# Patient Record
Sex: MALE | Race: WHITE | NOT HISPANIC OR LATINO
[De-identification: names, ages, dates, MRNs, and addresses within clinical notes are randomized per-mention and may not be internally consistent; named-entity substitution may affect disease eponyms.]

---

## 2022-09-21 ENCOUNTER — APPOINTMENT (OUTPATIENT)
Dept: NEUROLOGY | Facility: CLINIC | Age: 52
End: 2022-09-21

## 2022-09-28 ENCOUNTER — FORM ENCOUNTER (OUTPATIENT)
Age: 52
End: 2022-09-28

## 2022-09-30 ENCOUNTER — NON-APPOINTMENT (OUTPATIENT)
Age: 52
End: 2022-09-30

## 2022-09-30 ENCOUNTER — APPOINTMENT (OUTPATIENT)
Dept: NEUROLOGY | Facility: CLINIC | Age: 52
End: 2022-09-30

## 2022-09-30 VITALS — SYSTOLIC BLOOD PRESSURE: 157 MMHG | DIASTOLIC BLOOD PRESSURE: 103 MMHG

## 2022-09-30 VITALS
HEART RATE: 84 BPM | TEMPERATURE: 97.3 F | BODY MASS INDEX: 34.23 KG/M2 | OXYGEN SATURATION: 98 % | WEIGHT: 213 LBS | HEIGHT: 66 IN

## 2022-09-30 DIAGNOSIS — Z86.39 PERSONAL HISTORY OF OTHER ENDOCRINE, NUTRITIONAL AND METABOLIC DISEASE: ICD-10-CM

## 2022-09-30 DIAGNOSIS — I63.9 CEREBRAL INFARCTION, UNSPECIFIED: ICD-10-CM

## 2022-09-30 DIAGNOSIS — R20.2 ANESTHESIA OF SKIN: ICD-10-CM

## 2022-09-30 DIAGNOSIS — Z86.79 PERSONAL HISTORY OF OTHER DISEASES OF THE CIRCULATORY SYSTEM: ICD-10-CM

## 2022-09-30 DIAGNOSIS — R20.0 ANESTHESIA OF SKIN: ICD-10-CM

## 2022-09-30 DIAGNOSIS — E11.8 TYPE 2 DIABETES MELLITUS WITH UNSPECIFIED COMPLICATIONS: ICD-10-CM

## 2022-09-30 PROCEDURE — 99205 OFFICE O/P NEW HI 60 MIN: CPT

## 2022-09-30 RX ORDER — AMLODIPINE BESYLATE 5 MG/1
5 TABLET ORAL DAILY
Refills: 0 | Status: ACTIVE | COMMUNITY

## 2022-09-30 RX ORDER — RIVAROXABAN 20 MG/1
20 TABLET, FILM COATED ORAL
Refills: 0 | Status: ACTIVE | COMMUNITY

## 2022-09-30 RX ORDER — LISINOPRIL AND HYDROCHLOROTHIAZIDE TABLETS 20; 12.5 MG/1; MG/1
20-12.5 TABLET ORAL DAILY
Refills: 0 | Status: ACTIVE | COMMUNITY

## 2022-09-30 RX ORDER — GABAPENTIN 100 MG/1
100 CAPSULE ORAL
Qty: 120 | Refills: 3 | Status: ACTIVE | COMMUNITY

## 2022-09-30 RX ORDER — EZETIMIBE 10 MG/1
10 TABLET ORAL DAILY
Refills: 0 | Status: ACTIVE | COMMUNITY

## 2022-09-30 RX ORDER — ASPIRIN 81 MG
81 TABLET, DELAYED RELEASE (ENTERIC COATED) ORAL DAILY
Refills: 0 | Status: ACTIVE | COMMUNITY

## 2022-09-30 RX ORDER — ROSUVASTATIN CALCIUM 40 MG/1
40 TABLET, FILM COATED ORAL DAILY
Refills: 0 | Status: ACTIVE | COMMUNITY

## 2022-09-30 RX ORDER — LEVOTHYROXINE SODIUM 0.03 MG/1
25 TABLET ORAL DAILY
Refills: 0 | Status: ACTIVE | COMMUNITY

## 2022-09-30 NOTE — ASSESSMENT
[FreeTextEntry1] : The patient is a 52-year-old gentleman with a history of hypertension, hyperlipidemia, type 2 diabetes, hypothyroidism, and stroke in 2018/2019 as well as recurrent strokes over the last 2 months of unclear etiology.  Certainly, the patient has multiple risk factors for stroke but is also very young.  Unfortunately, I am unable to determine the most likely cause of the patient's recurrent strokes in the absence of complete records and review of imaging.  The patient and his wife will obtain this for me for my review.  I have requested repeat MRI with and without contrast of the brain, TTE with bubble to start.  We will consider additional labs (hypercoagulable testing, lipoprotein A), and even PET scan if necessary.  I have encouraged him to pursue the paratracheal biopsy as if it is proven to be cancer, this could be related to his recurrent strokes.  For now, he will continue Xarelto, aspirin 81 mg, and Crestor.  I encouraged him to work closely with his primary care provider for better control of his blood pressure and other risk factors.  We will consider HOLLAND screening at the next visit.

## 2022-09-30 NOTE — PHYSICAL EXAM
[FreeTextEntry1] : Alert.  Fully oriented.  Speech is mildly dysarthric.  Language is intact.  He has slight right nasolabial flattening.  Otherwise, cranial nerves are intact.  Individual muscle testing is full strength.  He does have a very subtle pronator drift on the right.  He is diffusely hyperreflexic with bilateral nonsustained clonus, bilateral positive Wendy's, and bilateral crossed adduction.  Toes are upgoing bilaterally.  Sensation is intact to light touch throughout but he reports it is decreased to temperature on the right compared to the left.  He has very slight difficulty with finger-to-nose and heel-to-shin on the right compared to the left.  AMRs are reduced on the right.  When attempting to stand, the patient has significant difficulty getting up.  He is a very unsteady, slightly wide-based gait.

## 2022-09-30 NOTE — HISTORY OF PRESENT ILLNESS
[FreeTextEntry1] : The patient is a 52-year-old gentleman with a history of hypertension, hyperlipidemia, type 2 diabetes, hypothyroidism, and stroke in 2018/2019 as well as recurrent strokes over the last 2 months.  He is here for stroke evaluation.\par \par I reviewed approximately 80/164 pages of records.  However, the remainder of the records were unavailable to me.  History was obtained by the patient and his wife.  I do not have images for my review.\par \par The patient states he developed right-sided tingling/numbness spontaneously in August.  At that time he was on aspirin 325 mg daily and statin therapy.  He underwent an MRI of the brain which showed acute ischemia in the left medulla (pyramid) and also the left jose.  In addition, he had more chronic appearing infarcts in the right PCA, right basal ganglia, and left corona radiata.  There was one microhemorrhage within the left caudate.  MRA head/neck were unremarkable apart from moderate stenosis of the left mid M1 segment and irregularity of the distal vertebrobasilar system consistent with atherosclerotic disease.  He underwent a TTE with bubble, CHINMAY, EEG x2, and had a loop recorder placed without clear cause of his stroke.  Lipid panel: Total cholesterol 153, triglycerides 224, LDL 81, HDL 27.  His medication was adjusted to Eliquis 5 mg twice daily plus aspirin 81 mg in addition to Crestor 40 mg.\par \par About a week later, the patient states he developed tingling/numbness of his whole body as well as generalized weakness.  Repeat MRI showed the left medullary/pyramidal acute/subacute stroke previously seen, enlargement of the left pontine stroke, and a new small right frontal subcortical infarct.  His Eliquis was transitioned to Xarelto.  He remained on aspirin and high intensity statin therapy.\par \par Of note, the patient was found to have a paratracheal nodule measuring 2.4 x 2.7 cm, stable compared to prior imaging a few years prior.  He is pending a biopsy for this.  In fact, he was said to have the biopsy last week and was off Xarelto for 3 days for the procedure when he developed recurrent right-sided tingling.  Biopsy was canceled and he was placed back on his antithrombotic therapy.\par \par Currently, the patient states he still remains physically weak.  He is in physical therapy for this.  He denies asymmetry to the weakness.  He still has persistent tingling which is bothersome.  He was started on gabapentin 100 mg 3 times daily for this.  He also has had significant anxiety regarding stroke recurrence and has had difficulty sleeping because of this.\par \par Otherwise, the patient does have a history of prior strokes in 2018, 2019.  He denies any history of clotting disorders in himself or his family.  He is a never smoker and does not use alcohol or other substances.

## 2022-10-08 VITALS
TEMPERATURE: 98 F | SYSTOLIC BLOOD PRESSURE: 175 MMHG | RESPIRATION RATE: 18 BRPM | DIASTOLIC BLOOD PRESSURE: 111 MMHG | OXYGEN SATURATION: 96 % | HEART RATE: 59 BPM

## 2022-10-08 PROCEDURE — 93010 ELECTROCARDIOGRAM REPORT: CPT

## 2022-10-08 PROCEDURE — 99285 EMERGENCY DEPT VISIT HI MDM: CPT

## 2022-10-08 RX ORDER — SODIUM CHLORIDE 9 MG/ML
1000 INJECTION INTRAMUSCULAR; INTRAVENOUS; SUBCUTANEOUS ONCE
Refills: 0 | Status: COMPLETED | OUTPATIENT
Start: 2022-10-08 | End: 2022-10-08

## 2022-10-08 RX ORDER — ONDANSETRON 8 MG/1
4 TABLET, FILM COATED ORAL ONCE
Refills: 0 | Status: COMPLETED | OUTPATIENT
Start: 2022-10-08 | End: 2022-10-09

## 2022-10-08 NOTE — ED ADULT NURSE NOTE - NS ED PATIENT SAFETY CONCERN
1. Caller Name: Rylen's mother                      Call Back Number: [phone] (home)     2. Message: Patients mother called in - relayed message below from Manuel.  Patients mom states she will bring him back in if it persists/worsens.     Notes Recorded by Sylvester Gonzalez PA-C on 4/17/2017 at 1:31 PM  Please call the patient, x-ray shows no fracture. Treatment is gentle range of motion, Tylenol or ibuprofen as needed.    3. Patient approves office to leave a detailed voicemail/MyChart message: N\A    
No

## 2022-10-08 NOTE — ED ADULT NURSE NOTE - NSIMPLEMENTINTERV_GEN_ALL_ED
Implemented All Universal Safety Interventions:  [place] to call system. Call bell, personal items and telephone within reach. Instruct patient to call for assistance. Room bathroom lighting operational. Non-slip footwear when patient is off stretcher. Physically safe environment: no spills, clutter or unnecessary equipment. Stretcher in lowest position, wheels locked, appropriate side rails in place.
yes

## 2022-10-09 ENCOUNTER — INPATIENT (INPATIENT)
Facility: HOSPITAL | Age: 52
LOS: 5 days | Discharge: NURSING, REHAB OR LONG-TERM CARE | DRG: 643 | End: 2022-10-15
Payer: COMMERCIAL

## 2022-10-09 DIAGNOSIS — D72.829 ELEVATED WHITE BLOOD CELL COUNT, UNSPECIFIED: ICD-10-CM

## 2022-10-09 DIAGNOSIS — Z86.73 PERSONAL HISTORY OF TRANSIENT ISCHEMIC ATTACK (TIA), AND CEREBRAL INFARCTION WITHOUT RESIDUAL DEFICITS: ICD-10-CM

## 2022-10-09 DIAGNOSIS — I10 ESSENTIAL (PRIMARY) HYPERTENSION: ICD-10-CM

## 2022-10-09 DIAGNOSIS — E78.5 HYPERLIPIDEMIA, UNSPECIFIED: ICD-10-CM

## 2022-10-09 DIAGNOSIS — E11.9 TYPE 2 DIABETES MELLITUS WITHOUT COMPLICATIONS: ICD-10-CM

## 2022-10-09 DIAGNOSIS — N17.9 ACUTE KIDNEY FAILURE, UNSPECIFIED: ICD-10-CM

## 2022-10-09 DIAGNOSIS — Z29.9 ENCOUNTER FOR PROPHYLACTIC MEASURES, UNSPECIFIED: ICD-10-CM

## 2022-10-09 DIAGNOSIS — E03.9 HYPOTHYROIDISM, UNSPECIFIED: ICD-10-CM

## 2022-10-09 DIAGNOSIS — E83.52 HYPERCALCEMIA: ICD-10-CM

## 2022-10-09 LAB
24R-OH-CALCIDIOL SERPL-MCNC: 40.8 NG/ML — SIGNIFICANT CHANGE UP (ref 30–80)
A1C WITH ESTIMATED AVERAGE GLUCOSE RESULT: 6.3 % — HIGH (ref 4–5.6)
ALBUMIN SERPL ELPH-MCNC: 3.5 G/DL — SIGNIFICANT CHANGE UP (ref 3.3–5)
ALBUMIN SERPL ELPH-MCNC: 3.9 G/DL — SIGNIFICANT CHANGE UP (ref 3.3–5)
ALP SERPL-CCNC: 89 U/L — SIGNIFICANT CHANGE UP (ref 40–120)
ALP SERPL-CCNC: 98 U/L — SIGNIFICANT CHANGE UP (ref 40–120)
ALT FLD-CCNC: 17 U/L — SIGNIFICANT CHANGE UP (ref 10–45)
ALT FLD-CCNC: 20 U/L — SIGNIFICANT CHANGE UP (ref 10–45)
ANION GAP SERPL CALC-SCNC: 12 MMOL/L — SIGNIFICANT CHANGE UP (ref 5–17)
ANION GAP SERPL CALC-SCNC: 12 MMOL/L — SIGNIFICANT CHANGE UP (ref 5–17)
ANION GAP SERPL CALC-SCNC: 15 MMOL/L — SIGNIFICANT CHANGE UP (ref 5–17)
APTT BLD: 43.7 SEC — HIGH (ref 27.5–35.5)
AST SERPL-CCNC: 21 U/L — SIGNIFICANT CHANGE UP (ref 10–40)
AST SERPL-CCNC: 23 U/L — SIGNIFICANT CHANGE UP (ref 10–40)
BASOPHILS # BLD AUTO: 0.05 K/UL — SIGNIFICANT CHANGE UP (ref 0–0.2)
BASOPHILS # BLD AUTO: 0.05 K/UL — SIGNIFICANT CHANGE UP (ref 0–0.2)
BASOPHILS NFR BLD AUTO: 0.4 % — SIGNIFICANT CHANGE UP (ref 0–2)
BASOPHILS NFR BLD AUTO: 0.4 % — SIGNIFICANT CHANGE UP (ref 0–2)
BILIRUB SERPL-MCNC: 0.6 MG/DL — SIGNIFICANT CHANGE UP (ref 0.2–1.2)
BILIRUB SERPL-MCNC: 0.7 MG/DL — SIGNIFICANT CHANGE UP (ref 0.2–1.2)
BUN SERPL-MCNC: 22 MG/DL — SIGNIFICANT CHANGE UP (ref 7–23)
BUN SERPL-MCNC: 24 MG/DL — HIGH (ref 7–23)
BUN SERPL-MCNC: 25 MG/DL — HIGH (ref 7–23)
CALCIUM SERPL-MCNC: 15.1 MG/DL — CRITICAL HIGH (ref 8.4–10.5)
CALCIUM SERPL-MCNC: 16.5 MG/DL — CRITICAL HIGH (ref 8.4–10.5)
CALCIUM SERPL-MCNC: 16.7 MG/DL — CRITICAL HIGH (ref 8.4–10.5)
CALCIUM SERPL-MCNC: 17.1 MG/DL — CRITICAL HIGH (ref 8.4–10.5)
CHLORIDE SERPL-SCNC: 102 MMOL/L — SIGNIFICANT CHANGE UP (ref 96–108)
CHLORIDE SERPL-SCNC: 108 MMOL/L — SIGNIFICANT CHANGE UP (ref 96–108)
CHLORIDE SERPL-SCNC: 108 MMOL/L — SIGNIFICANT CHANGE UP (ref 96–108)
CO2 SERPL-SCNC: 19 MMOL/L — LOW (ref 22–31)
CO2 SERPL-SCNC: 23 MMOL/L — SIGNIFICANT CHANGE UP (ref 22–31)
CO2 SERPL-SCNC: 24 MMOL/L — SIGNIFICANT CHANGE UP (ref 22–31)
CREAT ?TM UR-MCNC: 42 MG/DL — SIGNIFICANT CHANGE UP
CREAT SERPL-MCNC: 3.17 MG/DL — HIGH (ref 0.5–1.3)
CREAT SERPL-MCNC: 3.24 MG/DL — HIGH (ref 0.5–1.3)
CREAT SERPL-MCNC: 3.34 MG/DL — HIGH (ref 0.5–1.3)
EGFR: 22 ML/MIN/1.73M2 — LOW
EGFR: 22 ML/MIN/1.73M2 — LOW
EGFR: 23 ML/MIN/1.73M2 — LOW
EOSINOPHIL # BLD AUTO: 0.05 K/UL — SIGNIFICANT CHANGE UP (ref 0–0.5)
EOSINOPHIL # BLD AUTO: 0.09 K/UL — SIGNIFICANT CHANGE UP (ref 0–0.5)
EOSINOPHIL NFR BLD AUTO: 0.4 % — SIGNIFICANT CHANGE UP (ref 0–6)
EOSINOPHIL NFR BLD AUTO: 0.8 % — SIGNIFICANT CHANGE UP (ref 0–6)
ESTIMATED AVERAGE GLUCOSE: 134 MG/DL — HIGH (ref 68–114)
GLUCOSE SERPL-MCNC: 106 MG/DL — HIGH (ref 70–99)
GLUCOSE SERPL-MCNC: 83 MG/DL — SIGNIFICANT CHANGE UP (ref 70–99)
GLUCOSE SERPL-MCNC: 87 MG/DL — SIGNIFICANT CHANGE UP (ref 70–99)
HCT VFR BLD CALC: 46.4 % — SIGNIFICANT CHANGE UP (ref 39–50)
HCT VFR BLD CALC: 49.4 % — SIGNIFICANT CHANGE UP (ref 39–50)
HGB BLD-MCNC: 16.2 G/DL — SIGNIFICANT CHANGE UP (ref 13–17)
HGB BLD-MCNC: 17.3 G/DL — HIGH (ref 13–17)
IMM GRANULOCYTES NFR BLD AUTO: 0.2 % — SIGNIFICANT CHANGE UP (ref 0–0.9)
IMM GRANULOCYTES NFR BLD AUTO: 0.3 % — SIGNIFICANT CHANGE UP (ref 0–0.9)
INR BLD: 1.84 — HIGH (ref 0.88–1.16)
LACTATE SERPL-SCNC: 1.3 MMOL/L — SIGNIFICANT CHANGE UP (ref 0.5–2)
LIDOCAIN IGE QN: 40 U/L — SIGNIFICANT CHANGE UP (ref 7–60)
LYMPHOCYTES # BLD AUTO: 1.85 K/UL — SIGNIFICANT CHANGE UP (ref 1–3.3)
LYMPHOCYTES # BLD AUTO: 15.1 % — SIGNIFICANT CHANGE UP (ref 13–44)
LYMPHOCYTES # BLD AUTO: 18.7 % — SIGNIFICANT CHANGE UP (ref 13–44)
LYMPHOCYTES # BLD AUTO: 2.08 K/UL — SIGNIFICANT CHANGE UP (ref 1–3.3)
MAGNESIUM SERPL-MCNC: 1.6 MG/DL — SIGNIFICANT CHANGE UP (ref 1.6–2.6)
MCHC RBC-ENTMCNC: 30.3 PG — SIGNIFICANT CHANGE UP (ref 27–34)
MCHC RBC-ENTMCNC: 30.3 PG — SIGNIFICANT CHANGE UP (ref 27–34)
MCHC RBC-ENTMCNC: 34.9 GM/DL — SIGNIFICANT CHANGE UP (ref 32–36)
MCHC RBC-ENTMCNC: 35 GM/DL — SIGNIFICANT CHANGE UP (ref 32–36)
MCV RBC AUTO: 86.5 FL — SIGNIFICANT CHANGE UP (ref 80–100)
MCV RBC AUTO: 86.7 FL — SIGNIFICANT CHANGE UP (ref 80–100)
MONOCYTES # BLD AUTO: 0.95 K/UL — HIGH (ref 0–0.9)
MONOCYTES # BLD AUTO: 1.04 K/UL — HIGH (ref 0–0.9)
MONOCYTES NFR BLD AUTO: 7.7 % — SIGNIFICANT CHANGE UP (ref 2–14)
MONOCYTES NFR BLD AUTO: 9.4 % — SIGNIFICANT CHANGE UP (ref 2–14)
NEUTROPHILS # BLD AUTO: 7.83 K/UL — HIGH (ref 1.8–7.4)
NEUTROPHILS # BLD AUTO: 9.34 K/UL — HIGH (ref 1.8–7.4)
NEUTROPHILS NFR BLD AUTO: 70.4 % — SIGNIFICANT CHANGE UP (ref 43–77)
NEUTROPHILS NFR BLD AUTO: 76.2 % — SIGNIFICANT CHANGE UP (ref 43–77)
NRBC # BLD: 0 /100 WBCS — SIGNIFICANT CHANGE UP (ref 0–0)
NRBC # BLD: 0 /100 WBCS — SIGNIFICANT CHANGE UP (ref 0–0)
OSMOLALITY UR: 313 MOSM/KG — SIGNIFICANT CHANGE UP (ref 300–900)
PHOSPHATE SERPL-MCNC: 3.4 MG/DL — SIGNIFICANT CHANGE UP (ref 2.5–4.5)
PLATELET # BLD AUTO: 205 K/UL — SIGNIFICANT CHANGE UP (ref 150–400)
PLATELET # BLD AUTO: 273 K/UL — SIGNIFICANT CHANGE UP (ref 150–400)
POTASSIUM SERPL-MCNC: 3.9 MMOL/L — SIGNIFICANT CHANGE UP (ref 3.5–5.3)
POTASSIUM SERPL-MCNC: 4.1 MMOL/L — SIGNIFICANT CHANGE UP (ref 3.5–5.3)
POTASSIUM SERPL-MCNC: 4.6 MMOL/L — SIGNIFICANT CHANGE UP (ref 3.5–5.3)
POTASSIUM SERPL-SCNC: 3.9 MMOL/L — SIGNIFICANT CHANGE UP (ref 3.5–5.3)
POTASSIUM SERPL-SCNC: 4.1 MMOL/L — SIGNIFICANT CHANGE UP (ref 3.5–5.3)
POTASSIUM SERPL-SCNC: 4.6 MMOL/L — SIGNIFICANT CHANGE UP (ref 3.5–5.3)
PROT SERPL-MCNC: 5.9 G/DL — LOW (ref 6–8.3)
PROT SERPL-MCNC: 5.9 G/DL — LOW (ref 6–8.3)
PROT SERPL-MCNC: 6.3 G/DL — SIGNIFICANT CHANGE UP (ref 6–8.3)
PROT SERPL-MCNC: 6.9 G/DL — SIGNIFICANT CHANGE UP (ref 6–8.3)
PROTHROM AB SERPL-ACNC: 22 SEC — HIGH (ref 10.5–13.4)
PTH-INTACT FLD-MCNC: 659 PG/ML — HIGH (ref 15–65)
RBC # BLD: 5.35 M/UL — SIGNIFICANT CHANGE UP (ref 4.2–5.8)
RBC # BLD: 5.71 M/UL — SIGNIFICANT CHANGE UP (ref 4.2–5.8)
RBC # FLD: 12.7 % — SIGNIFICANT CHANGE UP (ref 10.3–14.5)
RBC # FLD: 12.8 % — SIGNIFICANT CHANGE UP (ref 10.3–14.5)
SARS-COV-2 RNA SPEC QL NAA+PROBE: NEGATIVE — SIGNIFICANT CHANGE UP
SODIUM SERPL-SCNC: 138 MMOL/L — SIGNIFICANT CHANGE UP (ref 135–145)
SODIUM SERPL-SCNC: 142 MMOL/L — SIGNIFICANT CHANGE UP (ref 135–145)
SODIUM SERPL-SCNC: 143 MMOL/L — SIGNIFICANT CHANGE UP (ref 135–145)
SODIUM UR-SCNC: 97 MMOL/L — SIGNIFICANT CHANGE UP
TROPONIN T SERPL-MCNC: 0.01 NG/ML — SIGNIFICANT CHANGE UP (ref 0–0.01)
UUN UR-MCNC: 311 MG/DL — SIGNIFICANT CHANGE UP
WBC # BLD: 11.12 K/UL — HIGH (ref 3.8–10.5)
WBC # BLD: 12.27 K/UL — HIGH (ref 3.8–10.5)
WBC # FLD AUTO: 11.12 K/UL — HIGH (ref 3.8–10.5)
WBC # FLD AUTO: 12.27 K/UL — HIGH (ref 3.8–10.5)

## 2022-10-09 PROCEDURE — 71045 X-RAY EXAM CHEST 1 VIEW: CPT | Mod: 26

## 2022-10-09 PROCEDURE — 76700 US EXAM ABDOM COMPLETE: CPT | Mod: 26

## 2022-10-09 PROCEDURE — 99221 1ST HOSP IP/OBS SF/LOW 40: CPT | Mod: GC

## 2022-10-09 PROCEDURE — 99233 SBSQ HOSP IP/OBS HIGH 50: CPT | Mod: GC

## 2022-10-09 PROCEDURE — 99223 1ST HOSP IP/OBS HIGH 75: CPT | Mod: GC

## 2022-10-09 RX ORDER — SODIUM CHLORIDE 9 MG/ML
1000 INJECTION INTRAMUSCULAR; INTRAVENOUS; SUBCUTANEOUS ONCE
Refills: 0 | Status: COMPLETED | OUTPATIENT
Start: 2022-10-09 | End: 2022-10-09

## 2022-10-09 RX ORDER — CALCITONIN SALMON 200 [IU]/ML
370 INJECTION, SOLUTION INTRAMUSCULAR ONCE
Refills: 0 | Status: COMPLETED | OUTPATIENT
Start: 2022-10-09 | End: 2022-10-09

## 2022-10-09 RX ORDER — FUROSEMIDE 40 MG
40 TABLET ORAL ONCE
Refills: 0 | Status: COMPLETED | OUTPATIENT
Start: 2022-10-09 | End: 2022-10-09

## 2022-10-09 RX ORDER — ACETAMINOPHEN 500 MG
650 TABLET ORAL EVERY 6 HOURS
Refills: 0 | Status: DISCONTINUED | OUTPATIENT
Start: 2022-10-09 | End: 2022-10-12

## 2022-10-09 RX ORDER — HYDRALAZINE HCL 50 MG
5 TABLET ORAL ONCE
Refills: 0 | Status: COMPLETED | OUTPATIENT
Start: 2022-10-09 | End: 2022-10-09

## 2022-10-09 RX ORDER — LANOLIN ALCOHOL/MO/W.PET/CERES
3 CREAM (GRAM) TOPICAL AT BEDTIME
Refills: 0 | Status: DISCONTINUED | OUTPATIENT
Start: 2022-10-09 | End: 2022-10-15

## 2022-10-09 RX ORDER — ASPIRIN/CALCIUM CARB/MAGNESIUM 324 MG
81 TABLET ORAL DAILY
Refills: 0 | Status: DISCONTINUED | OUTPATIENT
Start: 2022-10-09 | End: 2022-10-12

## 2022-10-09 RX ORDER — LEVOTHYROXINE SODIUM 125 MCG
1 TABLET ORAL
Qty: 0 | Refills: 0 | DISCHARGE

## 2022-10-09 RX ORDER — ATORVASTATIN CALCIUM 80 MG/1
80 TABLET, FILM COATED ORAL AT BEDTIME
Refills: 0 | Status: DISCONTINUED | OUTPATIENT
Start: 2022-10-09 | End: 2022-10-15

## 2022-10-09 RX ORDER — CALCITONIN SALMON 200 [IU]/ML
728 INJECTION, SOLUTION INTRAMUSCULAR EVERY 12 HOURS
Refills: 0 | Status: COMPLETED | OUTPATIENT
Start: 2022-10-10 | End: 2022-10-10

## 2022-10-09 RX ORDER — RIVAROXABAN 15 MG-20MG
20 KIT ORAL DAILY
Refills: 0 | Status: DISCONTINUED | OUTPATIENT
Start: 2022-10-09 | End: 2022-10-09

## 2022-10-09 RX ORDER — MAGNESIUM SULFATE 500 MG/ML
4 VIAL (ML) INJECTION ONCE
Refills: 0 | Status: COMPLETED | OUTPATIENT
Start: 2022-10-09 | End: 2022-10-09

## 2022-10-09 RX ORDER — ONDANSETRON 8 MG/1
4 TABLET, FILM COATED ORAL ONCE
Refills: 0 | Status: COMPLETED | OUTPATIENT
Start: 2022-10-09 | End: 2022-10-09

## 2022-10-09 RX ORDER — LEVOTHYROXINE SODIUM 125 MCG
25 TABLET ORAL DAILY
Refills: 0 | Status: DISCONTINUED | OUTPATIENT
Start: 2022-10-09 | End: 2022-10-13

## 2022-10-09 RX ORDER — APIXABAN 2.5 MG/1
2.5 TABLET, FILM COATED ORAL EVERY 12 HOURS
Refills: 0 | Status: DISCONTINUED | OUTPATIENT
Start: 2022-10-10 | End: 2022-10-12

## 2022-10-09 RX ORDER — SODIUM CHLORIDE 9 MG/ML
1000 INJECTION INTRAMUSCULAR; INTRAVENOUS; SUBCUTANEOUS
Refills: 0 | Status: DISCONTINUED | OUTPATIENT
Start: 2022-10-09 | End: 2022-10-09

## 2022-10-09 RX ORDER — AMLODIPINE BESYLATE 2.5 MG/1
5 TABLET ORAL DAILY
Refills: 0 | Status: DISCONTINUED | OUTPATIENT
Start: 2022-10-09 | End: 2022-10-14

## 2022-10-09 RX ORDER — SODIUM CHLORIDE 9 MG/ML
3000 INJECTION INTRAMUSCULAR; INTRAVENOUS; SUBCUTANEOUS
Refills: 0 | Status: DISCONTINUED | OUTPATIENT
Start: 2022-10-09 | End: 2022-10-11

## 2022-10-09 RX ORDER — ROSUVASTATIN CALCIUM 5 MG/1
1 TABLET ORAL
Qty: 0 | Refills: 0 | DISCHARGE

## 2022-10-09 RX ADMIN — SODIUM CHLORIDE 1000 MILLILITER(S): 9 INJECTION INTRAMUSCULAR; INTRAVENOUS; SUBCUTANEOUS at 01:29

## 2022-10-09 RX ADMIN — ONDANSETRON 4 MILLIGRAM(S): 8 TABLET, FILM COATED ORAL at 00:00

## 2022-10-09 RX ADMIN — SODIUM CHLORIDE 250 MILLILITER(S): 9 INJECTION INTRAMUSCULAR; INTRAVENOUS; SUBCUTANEOUS at 22:36

## 2022-10-09 RX ADMIN — Medication 5 MILLIGRAM(S): at 15:57

## 2022-10-09 RX ADMIN — Medication 5 MILLIGRAM(S): at 12:40

## 2022-10-09 RX ADMIN — Medication 40 MILLIGRAM(S): at 17:38

## 2022-10-09 RX ADMIN — Medication 3 MILLIGRAM(S): at 22:28

## 2022-10-09 RX ADMIN — Medication 5 MILLIGRAM(S): at 03:50

## 2022-10-09 RX ADMIN — Medication 25 MICROGRAM(S): at 06:44

## 2022-10-09 RX ADMIN — SODIUM CHLORIDE 200 MILLILITER(S): 9 INJECTION INTRAMUSCULAR; INTRAVENOUS; SUBCUTANEOUS at 03:00

## 2022-10-09 RX ADMIN — RIVAROXABAN 20 MILLIGRAM(S): KIT at 11:49

## 2022-10-09 RX ADMIN — CALCITONIN SALMON 370 INTERNATIONAL UNIT(S): 200 INJECTION, SOLUTION INTRAMUSCULAR at 12:59

## 2022-10-09 RX ADMIN — ONDANSETRON 4 MILLIGRAM(S): 8 TABLET, FILM COATED ORAL at 19:26

## 2022-10-09 RX ADMIN — SODIUM CHLORIDE 1000 MILLILITER(S): 9 INJECTION INTRAMUSCULAR; INTRAVENOUS; SUBCUTANEOUS at 00:00

## 2022-10-09 RX ADMIN — ATORVASTATIN CALCIUM 80 MILLIGRAM(S): 80 TABLET, FILM COATED ORAL at 22:29

## 2022-10-09 RX ADMIN — Medication 30 MILLILITER(S): at 09:44

## 2022-10-09 RX ADMIN — SODIUM CHLORIDE 250 MILLILITER(S): 9 INJECTION INTRAMUSCULAR; INTRAVENOUS; SUBCUTANEOUS at 12:42

## 2022-10-09 RX ADMIN — Medication 650 MILLIGRAM(S): at 16:30

## 2022-10-09 RX ADMIN — AMLODIPINE BESYLATE 5 MILLIGRAM(S): 2.5 TABLET ORAL at 06:44

## 2022-10-09 RX ADMIN — Medication 81 MILLIGRAM(S): at 11:49

## 2022-10-09 RX ADMIN — Medication 650 MILLIGRAM(S): at 15:51

## 2022-10-09 RX ADMIN — Medication 25 GRAM(S): at 13:58

## 2022-10-09 RX ADMIN — SODIUM CHLORIDE 200 MILLILITER(S): 9 INJECTION INTRAMUSCULAR; INTRAVENOUS; SUBCUTANEOUS at 08:17

## 2022-10-09 NOTE — H&P ADULT - ATTENDING COMMENTS
51 y/o non smoking M w recent CVA, multiple cardfiac risk factors, admitted w GI complaints severe hypercalcemia.  worsening renal insufficiency. r/o malignancy, hyper PTH.  Imaging chest abdomen , PTH , SPEP, UPEP< ACE etc ordered.  Roper placed, IV hydration NS I equal O, consider calcitonin, endo consult

## 2022-10-09 NOTE — H&P ADULT - NSICDXPASTMEDICALHX_GEN_ALL_CORE_FT
Detail Level: Zone
PAST MEDICAL HISTORY:  Diabetes     HTN (hypertension)     Hyperlipidemia     Hypothyroidism     Stroke

## 2022-10-09 NOTE — ED PROVIDER NOTE - PROGRESS NOTE DETAILS
[place]: WBC 12.2, Hgb 17.3, BUN/cr 25/3.34, Ca 17.1 other labs grossly wnl. US wnl. EKG w/ PVCs, normal QTc. Remains well appearing. Dr. Duran had called earlier about this pt. Rediscussed w/ Dr. Duran, will admit tele, IVF for now. D/w MICU consult team. Updated pt and son in law at bedside.

## 2022-10-09 NOTE — PROGRESS NOTE ADULT - PROBLEM SELECTOR PLAN 4
Per HIE: History of prior strokes in 2018, 2019. Most recently- The patient developed right-sided tingling/numbness spontaneously in August 2022. At that time he was on aspirin 325 mg daily and statin therapy. He underwent an MRI of the brain which showed acute ischemia in the left medulla (pyramid) and also the left jose. In addition, he had more chronic appearing infarcts in the right PCA, right basal ganglia, and left corona radiata. There was one microhemorrhage within the left caudate. MRA head/neck were unremarkable apart from moderate stenosis of the left mid M1 segment and irregularity of the distal vertebrobasilar system consistent with atherosclerotic disease. He underwent a TTE with bubble, CHINMAY, EEG x2, and had a loop recorder placed without clear cause of his stroke. Lipid panel: Total cholesterol 153, triglycerides 224, LDL 81, HDL 27. His medication was adjusted to Eliquis 5 mg twice daily plus aspirin 81 mg in addition to Crestor 40 mg.  About a week later, the patient states he developed tingling/numbness of his whole body as well as generalized weakness. Repeat MRI showed the left medullary/pyramidal acute/subacute stroke previously seen, enlargement of the left pontine stroke, and a new small right frontal subcortical infarct. His Eliquis was transitioned to Xarelto. He remains on aspirin and high intensity statin therapy. He is also on Gabapentin 100mg TID for tingling/numbness.    - c/w xarelto, aspirin, and high intensity statin therapy  - hold Gabapentin iso ARPITA Per HIE: History of prior strokes in 2018, 2019. Home meds Xarelto, ASA 81mg, Atorvastatin 80mg, & Gabapentin for tingling/numbness  - previous MRI of the brain showed acute ischemia in the left medulla (pyramid) and also the left jose, chronic appearing infarcts in the right PCA, right basal ganglia, and left corona radiata.   - Repeat MRI showed the left medullary/pyramidal acute/subacute stroke previously seen, enlargement of the left pontine stroke, and a new small right frontal subcortical infarct.     - c/w xarelto, aspirin, and high intensity statin therapy  - hold Gabapentin iso ARPITA

## 2022-10-09 NOTE — CONSULT NOTE ADULT - SUBJECTIVE AND OBJECTIVE BOX
HISTORY OF PRESENT ILLNESS  Nathan Edmond is a 52-years-old male with a past medical history of     DIABETES HISTORY  Age at diagnosis:   How was he diagnosed:   Current Therapy / History of other regimens:   History of hypoglycemia:   History of DKA/HHS:   Home FSG:  Diet:    Activity:    Complications:   Outpatient Endo:     PAST MEDICAL & SURGICAL HISTORY: as per HPI.    FAMILY HISTORY  DM:  Thyroid:  Autoimmune:  Other:    SOCIAL HISTORY  Work:  Smoking:  Etoh:  Recreational Drugs:    ALLERGIES  No Known Allergies    CURRENT MEDICATIONS  acetaminophen     Tablet .. 650 milliGRAM(s) Oral every 6 hours PRN  aluminum hydroxide/magnesium hydroxide/simethicone Suspension 30 milliLiter(s) Oral every 6 hours PRN  amLODIPine   Tablet 5 milliGRAM(s) Oral daily  aspirin enteric coated 81 milliGRAM(s) Oral daily  atorvastatin 80 milliGRAM(s) Oral at bedtime  levothyroxine 25 MICROGram(s) Oral daily  melatonin 3 milliGRAM(s) Oral at bedtime PRN  rivaroxaban 20 milliGRAM(s) Oral daily  sodium chloride 0.9%. 3000 milliLiter(s) IV Continuous <Continuous>    REVIEW OF SYSTEMS  Constitutional:  Negative fever, chills or loss of appetite.  Eyes:  Negative blurry vision or double vision.  Cardiovascular:  Negative for chest pain or palpitations.  Respiratory:  Negative for cough, wheezing, or SOB.   Gastrointestinal:  Negative for nausea, vomiting, diarrhea, constipation, or abdominal pain.  Genitourinary:  Negative frequency, urgency or dysuria.  Neurologic:  No headache, confusion, dizziness, lightheadedness.    PHYSICAL EXAM  Vital Signs Last 24 Hrs  T(C): 36.6 (09 Oct 2022 13:59), Max: 36.9 (08 Oct 2022 23:08)  T(F): 97.8 (09 Oct 2022 13:59), Max: 98.4 (08 Oct 2022 23:08)  HR: 92 (09 Oct 2022 12:13) (59 - 99)  BP: 167/98 (09 Oct 2022 12:13) (148/81 - 187/105)  BP(mean): 127 (09 Oct 2022 12:13) (109 - 149)  RR: 14 (09 Oct 2022 12:13) (14 - 26)  SpO2: 94% (09 Oct 2022 12:13) (91% - 98%)    Parameters below as of 09 Oct 2022 12:13  Patient On (Oxygen Delivery Method): room air      Height (cm): 167.6 (10-09 @ 02:41)  Weight (kg): 91.4 (10-09 @ 02:41)  BMI (kg/m2): 32.5 (10-09 @ 02:41)    Constitutional: Awake, alert, in no acute distress.   HEENT: Normocephalic, atraumatic, MADY, no proptosis or lid retraction.   Neck: supple, no acanthosis, no thyromegaly or palpable thyroid nodules.  Respiratory: Lungs clear to ausculation bilaterally.   Cardiovascular: regular rhythm, normal S1 and S2, no audible murmurs.   GI: soft, non-tender, non-distended, bowel sounds present, no masses appreciated.  Extremities: No lower extremity edema, peripheral pulses present.   Skin: no rashes.   Psychiatric: AAO x 3. Normal affect/mood.     LABS                        16.2   11.12 )-----------( 205      ( 09 Oct 2022 05:30 )             46.4     10-09    143  |  108  |  24<H>  ----------------------------<  83  4.1   |  23  |  3.24<H>    Ca    16.5<HH>      09 Oct 2022 05:30  Phos  3.4     10-09  Mg     1.6     10-09    TPro  6.3  /  Alb  3.5  /  TBili  0.6  /  DBili  x   /  AST  21  /  ALT  17  /  AlkPhos  89  10-09    LIVER FUNCTIONS - ( 09 Oct 2022 05:30 )  Alb: 3.5 g/dL / Pro: 6.3 g/dL / ALK PHOS: 89 U/L / ALT: 17 U/L / AST: 21 U/L / GGT: x           PT/INR - ( 09 Oct 2022 05:30 )   PT: 22.0 sec;   INR: 1.84          PTT - ( 09 Oct 2022 05:30 )  PTT:43.7 sec          CAPILLARY BLOOD GLUCOSE & INSULIN RECEIVED  Yesterday  - Dinner FSG: *** mg/dL = *** units of premeal Lispro + *** units of Lispro sliding scale.   - Bedtime FSG: *** mg/dL = *** units of Lantus + *** units Lispro sliding scale.     Today  - Breakfast FSG: *** mg/dL = *** units of premeal Lispro + *** units of Lispro sliding scale.   - Lunch FSG: *** mg/dL = *** units of premeal Lispro + *** units of Lispro sliding scale.     CAPILLARY BLOOD GLUCOSE            ASSESSMENT / RECOMMENDATIONS      A1C: ***  Weight: ***  BMI: ***  Creatinine: ***  GFR: ***  Ejection Fraction: ***    # Type 2 diabetes mellitus  - Please continue lantus *** units at bedtime.   - Continue lispro *** units before each meal.  - Continue lispro moderate / low dose sliding scale four times daily with meals and at bedtime.  - Patient's fingerstick glucose goal is 100-180 mg/dL.    - For discharge, patient can ***.    - Patient can follow up at discharge with [facility] by calling [phone] to make an appointment.      Thank you for allowing us to participate in the care of ***.  Will continue to monitor.       Case discussed with Dr. Fox. Primary team updated.       Sen Henson    Endocrinology Fellow    Service Pager: [phone]  HISTORY OF PRESENT ILLNESS  Nathan Edmond is a 52-years-old male with a past medical history of type 2 diabetes mellitus, hypertension, hyperlipidemia and cerebrovascular accidents (last one ~4 weeks ago, with residual right sided weakness and slurred speech) who presented to the emergency department complaining of nausea and multiple episodes of non-bloody non-bilious vomiting for the past ~4 days. In addition, per family, he appeared to be more lethargic and "not himself" for the past few weeks. Labs were remarkable for hypercalcemia (17.1 mg/dL) and acute kidney injury (BUN 25 / Cr 3.34 mg/dL). Patient denied taking any calcium supplements, but mentioned taking lisinopril-HCTZ at home for his hypertension. He was admitted for further management of hypercalcemia. Patient received 2 liters of NS bolus and was started on maintenance fluids with sodium chloride 0.9% at 250 mL/hr. He also received calcitonin 4U/kg once. Endocrinology was consulted for further recommendations.     PAST MEDICAL & SURGICAL HISTORY: as per HPI.    ALLERGIES  No Known Allergies    CURRENT MEDICATIONS  acetaminophen     Tablet .. 650 milliGRAM(s) Oral every 6 hours PRN  aluminum hydroxide/magnesium hydroxide/simethicone Suspension 30 milliLiter(s) Oral every 6 hours PRN  amLODIPine   Tablet 5 milliGRAM(s) Oral daily  aspirin enteric coated 81 milliGRAM(s) Oral daily  atorvastatin 80 milliGRAM(s) Oral at bedtime  levothyroxine 25 MICROGram(s) Oral daily  melatonin 3 milliGRAM(s) Oral at bedtime PRN  rivaroxaban 20 milliGRAM(s) Oral daily  sodium chloride 0.9%. 3000 milliLiter(s) IV Continuous <Continuous>    REVIEW OF SYSTEMS  Constitutional:  Negative fever or chills.  Eyes:  Negative blurry vision or double vision.  Cardiovascular:  Negative for chest pain or palpitations.  Respiratory:  Negative for cough, wheezing, or SOB.   Gastrointestinal:  (+) Nausea, vomiting, diarrhea. Negative constipation, or abdominal pain.  Neurologic:  (+) Confusion. No headache, dizziness, lightheadedness.    PHYSICAL EXAM  Vital Signs Last 24 Hrs  T(C): 36.6 (09 Oct 2022 13:59), Max: 36.9 (08 Oct 2022 23:08)  T(F): 97.8 (09 Oct 2022 13:59), Max: 98.4 (08 Oct 2022 23:08)  HR: 92 (09 Oct 2022 12:13) (59 - 99)  BP: 167/98 (09 Oct 2022 12:13) (148/81 - 187/105)  BP(mean): 127 (09 Oct 2022 12:13) (109 - 149)  RR: 14 (09 Oct 2022 12:13) (14 - 26)  SpO2: 94% (09 Oct 2022 12:13) (91% - 98%)    Parameters below as of 09 Oct 2022 12:13  Patient On (Oxygen Delivery Method): room air    Height (cm): 167.6 (10-09 @ 02:41)  Weight (kg): 91.4 (10-09 @ 02:41)  BMI (kg/m2): 32.5 (10-09 @ 02:41)    Constitutional: Awake, alert, obese male, confused, somnolent, with slurred speech, in no acute distress.   HEENT: Normocephalic, atraumatic, MADY.  Respiratory: Lungs clear to ausculation bilaterally.   Cardiovascular: regular rhythm, normal S1 and S2, no audible murmurs.   GI: soft, non-tender, non-distended, bowel sounds present.  Extremities: No lower extremity edema.  Psychiatric: AAO x 3.     LABS                        16.2   11.12 )-----------( 205      ( 09 Oct 2022 05:30 )             46.4     143  |  108  |  24<H>  ----------------------------<  83  4.1   |  23  |  3.24<H>    Ca    16.5<HH>      09 Oct 2022 05:30  Phos  3.4     10-09  Mg     1.6     10-09  TPro  6.3  /  Alb  3.5  /  TBili  0.6  /  DBili  x   /  AST  21  /  ALT  17  /  AlkPhos  89  10-09  Alb: 3.5 g/dL / Pro: 6.3 g/dL / ALK PHOS: 89 U/L / ALT: 17 U/L / AST: 21 U/L / GGT: x         PT/INR - ( 09 Oct 2022 05:30 )   PT: 22.0 sec;   INR: 1.84     PTT - ( 09 Oct 2022 05:30 )  PTT:43.7 sec    ASSESSMENT / RECOMMENDATIONS  Mr. Edmond is a 52-years-old male with a past medical history of type 2 diabetes mellitus, hypertension, hyperlipidemia and  cerebrovascular accidents who presented to the emergency department complaining of nausea and multiple episodes of non-bloody non-bilious vomiting for the past ~4 days. Per family, he appeared to be more lethargic and "not himself" for the past few weeks. Labs were remarkable for hypercalcemia (17.1 mg/dL) and acute kidney injury.     Upon review of outpatient labs, patient has had hypercalcemia for more than 2 years, last level was 11 mg/dL (9/3/22 at [facility]). Per patient, he has been told that his calcium was high in the past, and that he might need surgery; however, further management of hypercalcemia was postponed due to stroke. PTH was 659 and Vitamin D-OH was 40.8. He's likely experiencing primary hyperparathyroidism.    A1C: 6.2%  Weight: 91 kg  BMI: 32.5  Creatinine: 3.24  GFR: 22    # Primary hyperparathyroidism  - Status post calcitonin 4 units/kg once (10/9/22).   - Agree with IV fluids. Monitor for signs of fluid overload.   - Consider administering Lasix 40 mg IVP once.   - Would not recommend to administer bisphosphonates at this time, pending clinical course. If his calcium doesn't decrease appropriately while on fluids, would consider administering a renally adjusted dose of pamidronate.   - Please monitor calcium every 12 hours for now.  - Discussed with patient that he will eventually need parathyroid surgery which can be done as outpatient once hypercalcemia resolves.     Thank you for allowing us to participate in the care of Mr. Edmond.  Will continue to monitor.       Case discussed with Dr. Lacey. Primary team updated.       Sen Henson    Endocrinology Fellow    Service Pager: [phone]

## 2022-10-09 NOTE — H&P ADULT - NSHPPHYSICALEXAM_GEN_ALL_CORE
VITAL SIGNS:  T(C): 36.7 (10-09-22 @ 01:24), Max: 36.9 (10-08-22 @ 23:08)  T(F): 98 (10-09-22 @ 01:24), Max: 98.4 (10-08-22 @ 23:08)  HR: 62 (10-09-22 @ 01:24) (59 - 62)  BP: 161/80 (10-09-22 @ 01:24) (161/80 - 175/111)  BP(mean): --  RR: 18 (10-09-22 @ 01:24) (18 - 18)  SpO2: 98% (10-09-22 @ 01:24) (96% - 98%)  Wt(kg): --    PHYSICAL EXAM:  Constitutional: NAD resting comfortably in bed; NAD  Head: NC/AT  Eyes: PERRL, EOMI, anicteric sclera  ENT: no nasal discharge; uvula midline, no oropharyngeal erythema or exudates; MMM  Neck: supple; no JVD or thyromegaly  Respiratory: CTA B/L; no W/R/R, no retractions  Cardiac: +S1/S2; RRR; no M/R/G; PMI non-displaced  Gastrointestinal: abdomen soft, +mild tenderness diffusely, non-distended; no rebound or guarding  Extremities: WWP, no clubbing or cyanosis; no peripheral edema  Musculoskeletal: NROM x4; no joint swelling, tenderness or erythema  Vascular: 2+ radial, DP pulses B/L  Dermatologic: skin warm, dry and intact; no rashes, wounds, or scars  Neurologic: AAOx3; CNII-XII grossly intact; no focal deficits  Psychiatric: affect and characteristics of appearance, verbalizations, behaviors are appropriate VITAL SIGNS:  T(C): 36.7 (10-09-22 @ 01:24), Max: 36.9 (10-08-22 @ 23:08)  T(F): 98 (10-09-22 @ 01:24), Max: 98.4 (10-08-22 @ 23:08)  HR: 62 (10-09-22 @ 01:24) (59 - 62)  BP: 161/80 (10-09-22 @ 01:24) (161/80 - 175/111)  BP(mean): --  RR: 18 (10-09-22 @ 01:24) (18 - 18)  SpO2: 98% (10-09-22 @ 01:24) (96% - 98%)  Wt(kg): --    PHYSICAL EXAM:  Constitutional: NAD resting comfortably in bed;   Head: NC/AT  ENT: no nasal discharge; uvula midline, no oropharyngeal erythema or exudates; MMM  Neck: supple; no JVD or thyromegaly  Respiratory: CTA B/L;   Cardiac: +S1/S2; RRR; no M/R/G;   Gastrointestinal: abdomen soft, +mild tenderness diffusely, non-distended; no rebound or guarding  Extremities: WWP, no clubbing or cyanosis; no peripheral edema  Musculoskeletal: NROM x4; no joint swelling, tenderness or erythema  Vascular: 2+ radial, DP pulses B/L  Dermatologic: skin warm, dry and intact; no rashes, wounds, or scars  Neurologic: AAOx3; CNII-XII grossly intact; no focal deficits  Psychiatric: affect and characteristics of appearance, verbalizations, behaviors are appropriate

## 2022-10-09 NOTE — H&P ADULT - HISTORY OF PRESENT ILLNESS
53 yo M w/ PMHx HTN, T2DM, HLD, Hypothyroidism, CVA (most recently admitted ~4w ago, admitted at [facility], R weakness, slurred speech) presents with 3-4d of multiple NBNB nausea and vomiting. He also has mild rhinorrhea and diarrhea. No other systemic symptoms. Denies HA, confusion, f/c, cough, sore throat, SOB, CP, abd pain, urinary symptoms, new weakness/numbness, black/bloody stool, rashes. 51 yo M w/ PMHx HTN, T2DM, HLD, Hypothyroidism, CVA (most recently admitted ~4w ago, admitted at [facility], R weakness, slurred speech) presents with 3-4d of multiple NBNB nausea and vomiting. He also has mild rhinorrhea and diarrhea. No other systemic symptoms. Denies HA, confusion, f/c, cough, sore throat, SOB, CP, abd pain, urinary symptoms, new weakness/numbness, black/bloody stool, rashes.    In the ED:  Initial vital signs: T: XX F, HR: XX, BP: XX, R: XX, SpO2: XX% on RA  Labs: significant for WBC 12.27, Hgb 17.3, hypercalcemia 17.1, BUN 25, Cr 3.34,   Imaging: US Abd :No cholelithiasis or cholecystitis.  EKG:   Medications: 2L NS Bolus, Zofran 4mg   Consults: none  53 yo M w/ PMHx HTN, T2DM, HLD, Hypothyroidism, CVA (most recently admitted ~4w ago, admitted at [facility], R weakness, slurred speech) presents with 3-4d of multiple NBNB nausea and vomiting. In the ED, he was found to have a calcium of 17.1. He says he hasn't been able to keep down any liquids/food. Also, per the son over the past few weeks he has seemed more lethargic and not himself. 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 He takes Lisinopril-HCTZ daily for HTN. He denies recent tums or calcium supplementation. Most recent Calcium 9/3 at [facility] was 11. No other systemic symptoms. Denies HA, confusion, f/c, cough, sore throat, SOB, CP, abd pain, urinary symptoms, new weakness/numbness, black/bloody stool, rashes.    In the ED:  Initial vital signs: T: XX F, HR: XX, BP: XX, R: XX, SpO2: XX% on RA  Labs: significant for WBC 12.27, Hgb 17.3, hypercalcemia 17.1, BUN 25, Cr 3.34,   Imaging: US Abd :No cholelithiasis or cholecystitis.  EKG:   Medications: 2L NS Bolus, Zofran 4mg   Consults: none  51 yo M w/ PMHx HTN, T2DM, HLD, Hypothyroidism, CVA (most recently admitted ~4w ago, admitted at [facility], R weakness, slurred speech) presents with 3-4d of multiple NBNB nausea and vomiting. In the ED, he was found to have a calcium of 17.1. He says he hasn't been able to keep down any liquids/food. Also, per the son over the past few weeks he has seemed more lethargic and not himself. 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 He takes Lisinopril-HCTZ daily for HTN. He denies recent tums or calcium supplementation. Most recent Calcium 9/3 at [facility] was 11. No other systemic symptoms. Denies HA, confusion, f/c, cough, sore throat, SOB, CP, abd pain, urinary symptoms, new weakness/numbness, black/bloody stool, rashes.    In the ED:  Initial vital signs: T: 98.9 degrees F, HR: 59, BP: 175/111, R: 18, SpO2: 96% on RA  Labs: significant for WBC 12.27, Hgb 17.3, hypercalcemia 17.1, BUN 25, Cr 3.34,   Imaging: US Abd :No cholelithiasis or cholecystitis.  EKG: Sinus rhythm w/ PVCs, normal QTc  Medications: 2L NS Bolus, Zofran 4mg   Consults: none

## 2022-10-09 NOTE — CHART NOTE - NSCHARTNOTEFT_GEN_A_CORE
HPI:    Received page from resident about patient. Patient chart reviewed. Nathan Edmond is a 52-years-old male with a past medical history of type 2 diabetes mellitus, hypertension, hyperlipidemia and cerebrovascular accidents (last one ~4 weeks ago, with residual right sided weakness and slurred speech) who presented to the emergency department complaining of nausea and multiple episodes of non-bloody non-bilious vomiting for the past ~4 days. In addition, per family, he appeared to be more lethargic and "not himself" for the past few weeks. Labs were remarkable for hypercalcemia (17.1 mg/dL) and acute kidney injury (BUN 25 / Cr 3.34 mg/dL). Patient denied taking any calcium supplements, but mentioned taking lisinopril-HCTZ at home for his hypertension. He was admitted for further management of hypercalcemia. Patient received 2 liters of NS bolus and was started on maintenance fluids with sodium chloride 0.9% at 250 mL/hr. He also received calcitonin 4U/kg once. Nephrology consulted for ARPITA.       ARPITA vs ARPITA on CKD   likely etiology volume depletion 2/2 to hypercalcemia , however FEUrea consistent with intrinsic etiology ., unlikely iATN given no documented hypotensive episodes   No Hydronephrosis appreciated on abdominal sono  Unknown baseline sCr    Plan:   Please obtain UA   Repeat Urine Na+ Urine Cr. UPCR Urine Urea   Hold RASSI, HCTZ   Continue with IVF @250cc/hr   BID BMP   Monitor Urine out put   Strict Ins and outs       Hypercalcemia   Likely due to Primary hyperparathyroidisms    Plan:  Continue with IVF as above  BID BMP   Continue with Calcitonin  Can also give lasix 40mg x1     Full consult note to follow in AM                                 LABS:  10-09    143  |  108  |  24<H>  ----------------------------<  83  4.1   |  23  |  3.24<H>    Ca    16.5<HH>      09 Oct 2022 05:30  Phos  3.4     10-09  Mg     1.6     10-09    TPro  6.3  /  Alb  3.5  /  TBili  0.6  /  DBili      /  AST  21  /  ALT  17  /  AlkPhos  89  10-09    Creatinine Trend: 3.24 <--, 3.34 <--                        16.2   11.12 )-----------( 205      ( 09 Oct 2022 05:30 )             46.4     Urine Studies:    Creatinine, Random Urine: 42 mg/dL (10-09 @ 07:47)  Osmolality, Random Urine: 313 mosm/kg (10-09 @ 07:47)  Sodium, Random Urine: 97 mmol/L (10-09 @ 07:47) HPI:    Received page from resident about patient. Patient chart reviewed. Nathan Edmond is a 52-years-old male with a past medical history of type 2 diabetes mellitus, hypertension, hyperlipidemia and cerebrovascular accidents (last one ~4 weeks ago, with residual right sided weakness and slurred speech) who presented to the emergency department complaining of nausea and multiple episodes of non-bloody non-bilious vomiting for the past ~4 days. In addition, per family, he appeared to be more lethargic and "not himself" for the past few weeks. Labs were remarkable for hypercalcemia (17.1 mg/dL) and acute kidney injury (BUN 25 / Cr 3.34 mg/dL). Patient denied taking any calcium supplements, but mentioned taking lisinopril-HCTZ at home for his hypertension. He was admitted for further management of hypercalcemia. Patient received 2 liters of NS bolus and was started on maintenance fluids with sodium chloride 0.9% at 250 mL/hr. He also received calcitonin 4U/kg once. Nephrology consulted for ARPITA.       ARPITA vs ARPITA on CKD   likely etiology volume depletion 2/2 to hypercalcemia , however FEUrea consistent with intrinsic etiology ., unlikely iATN given no documented hypotensive episodes   No Hydronephrosis appreciated on abdominal sono  Unknown baseline sCr    Plan:   Please obtain UA   Repeat Urine Na+ Urine Cr. UPCR Urine Urea   Hold RASSI, HCTZ   Continue with IVF @250cc/hr   BID BMP   Monitor Urine out put   Strict Ins and outs   Switch Xeralto to Eliquis       Hypercalcemia   Likely due to Primary hyperparathyroidisms    Plan:  Continue with IVF as above  BID BMP   Continue with Calcitonin  Can also give lasix 40mg x1 once patient is volume repleted   Recommend  Denosumab 60mg subq x1 given renal impairment       Full consult note to follow in AM                                 LABS:  10-09    143  |  108  |  24<H>  ----------------------------<  83  4.1   |  23  |  3.24<H>    Ca    16.5<HH>      09 Oct 2022 05:30  Phos  3.4     10-09  Mg     1.6     10-09    TPro  6.3  /  Alb  3.5  /  TBili  0.6  /  DBili      /  AST  21  /  ALT  17  /  AlkPhos  89  10-09    Creatinine Trend: 3.24 <--, 3.34 <--                        16.2   11.12 )-----------( 205      ( 09 Oct 2022 05:30 )             46.4     Urine Studies:    Creatinine, Random Urine: 42 mg/dL (10-09 @ 07:47)  Osmolality, Random Urine: 313 mosm/kg (10-09 @ 07:47)  Sodium, Random Urine: 97 mmol/L (10-09 @ 07:47)

## 2022-10-09 NOTE — PROGRESS NOTE ADULT - SUBJECTIVE AND OBJECTIVE BOX
OVERNIGHT EVENTS:  Hypertensive urgency with SBP to 180s, given 5mg Hydralazine    SUBJECTIVE / INTERVAL HPI: Patient seen and examined at bedside. Some mild heartburn, gave maalox    VITAL SIGNS:  Vital Signs Last 24 Hrs  T(C): 36.6 (09 Oct 2022 13:59), Max: 36.9 (08 Oct 2022 23:08)  T(F): 97.8 (09 Oct 2022 13:59), Max: 98.4 (08 Oct 2022 23:08)  HR: 92 (09 Oct 2022 12:13) (59 - 99)  BP: 167/98 (09 Oct 2022 12:13) (148/81 - 187/105)  BP(mean): 127 (09 Oct 2022 12:13) (109 - 149)  RR: 14 (09 Oct 2022 12:13) (14 - 26)  SpO2: 94% (09 Oct 2022 12:13) (91% - 98%)    Parameters below as of 09 Oct 2022 12:13  Patient On (Oxygen Delivery Method): room air        PHYSICAL EXAM:    General: NAD  HEENT: NCAT  Neck: supple, trachea midline  Cardiovascular: S1, S2 normal; RRR, no M/G/R  Respiratory: CTABL; no W/R/R  Gastrointestinal: soft. +mild, diffuse tenderness. ND. no rebound or guarding.   Skin: no ulcerations or visible rashes appreciated  Extremities: WWP; no edema, clubbing or cyanosis  Vascular: 2+ DP pulses.   Neurological: AAOx3; CN II-XII grossly intact; no focal deficits    MEDICATIONS:  MEDICATIONS  (STANDING):  amLODIPine   Tablet 5 milliGRAM(s) Oral daily  aspirin enteric coated 81 milliGRAM(s) Oral daily  atorvastatin 80 milliGRAM(s) Oral at bedtime  levothyroxine 25 MICROGram(s) Oral daily  rivaroxaban 20 milliGRAM(s) Oral daily  sodium chloride 0.9%. 3000 milliLiter(s) (250 mL/Hr) IV Continuous <Continuous>    MEDICATIONS  (PRN):  acetaminophen     Tablet .. 650 milliGRAM(s) Oral every 6 hours PRN Temp greater or equal to 38C (100.4F), Mild Pain (1 - 3)  aluminum hydroxide/magnesium hydroxide/simethicone Suspension 30 milliLiter(s) Oral every 6 hours PRN Dyspepsia  melatonin 3 milliGRAM(s) Oral at bedtime PRN Insomnia      ALLERGIES:  Allergies    No Known Allergies    Intolerances        LABS:                        16.2   11.12 )-----------( 205      ( 09 Oct 2022 05:30 )             46.4     10-09    143  |  108  |  24<H>  ----------------------------<  83  4.1   |  23  |  3.24<H>    Ca    16.5<HH>      09 Oct 2022 05:30  Phos  3.4     10-09  Mg     1.6     10-09    TPro  6.3  /  Alb  3.5  /  TBili  0.6  /  DBili  x   /  AST  21  /  ALT  17  /  AlkPhos  89  10-09    PT/INR - ( 09 Oct 2022 05:30 )   PT: 22.0 sec;   INR: 1.84          PTT - ( 09 Oct 2022 05:30 )  PTT:43.7 sec    CAPILLARY BLOOD GLUCOSE          RADIOLOGY & ADDITIONAL TESTS: Reviewed.

## 2022-10-09 NOTE — ED PROVIDER NOTE - CLINICAL SUMMARY MEDICAL DECISION MAKING FREE TEXT BOX
52M PMH HTN, DM, HLD, ILR, CVA (most recently admitted ~4w ago, admitted at [facility], R weakness. slurred speech) p/w 3-4d of multiple NBNB NV. Mild rhinorrhea and diarrhea. No other systemic symptoms.   Hypertensive, other vitals wnl. Exam as above.  ddX: Possible gastro vs. osmar vs. metabolic. Unlikely to be caused by HTN - rather HTN likely 2/2 vomiting/unable to keep down meds.   Labs, US, IVF/symptom control, reassess.

## 2022-10-09 NOTE — H&P ADULT - NSHPLABSRESULTS_GEN_ALL_CORE
LABS:                         17.3   12.27 )-----------( 273      ( 09 Oct 2022 00:07 )             49.4     10-09    138  |  102  |  25<H>  ----------------------------<  87  4.6   |  24  |  3.34<H>    Ca    17.1<HH>      09 Oct 2022 00:07    TPro  6.9  /  Alb  3.9  /  TBili  0.7  /  DBili  x   /  AST  23  /  ALT  20  /  AlkPhos  98  10-09          Lactate, Blood: 1.3 mmol/L (10-09 @ 00:07)      RADIOLOGY, EKG & ADDITIONAL TESTS: Reviewed.

## 2022-10-09 NOTE — H&P ADULT - PROBLEM SELECTOR PLAN 4
Per HIE: History of prior strokes in 2018, 2019. Most recently- The patient developed right-sided tingling/numbness spontaneously in August 2022. At that time he was on aspirin 325 mg daily and statin therapy. He underwent an MRI of the brain which showed acute ischemia in the left medulla (pyramid) and also the left jose. In addition, he had more chronic appearing infarcts in the right PCA, right basal ganglia, and left corona radiata. There was one microhemorrhage within the left caudate. MRA head/neck were unremarkable apart from moderate stenosis of the left mid M1 segment and irregularity of the distal vertebrobasilar system consistent with atherosclerotic disease. He underwent a TTE with bubble, CHINMAY, EEG x2, and had a loop recorder placed without clear cause of his stroke. Lipid panel: Total cholesterol 153, triglycerides 224, LDL 81, HDL 27. His medication was adjusted to Eliquis 5 mg twice daily plus aspirin 81 mg in addition to Crestor 40 mg.  About a week later, the patient states he developed tingling/numbness of his whole body as well as generalized weakness. Repeat MRI showed the left medullary/pyramidal acute/subacute stroke previously seen, enlargement of the left pontine stroke, and a new small right frontal subcortical infarct. His Eliquis was transitioned to Xarelto. He remains on aspirin and high intensity statin therapy. He is also on Gabapentin 100mg TID for tingling/numbness.    - c/w xarelto, aspirin, and high intensity statin therapy  - hold Gabapentin iso ARPITA

## 2022-10-09 NOTE — CONSULT NOTE ADULT - ATTENDING COMMENTS
Pt seen at bedside with fellow.   called.  Even with , history a bit difficult to obtain.  no FH of thyroid disease or autoimmune disease.  Diarrhea, 10 lb weight loss for past 10 days.  no eye symptoms. no recent viral illness, contrast studies or amiodarone history. Exam per fellow's note.  Pt is tachy (105-110 range) but clinically not thyrotoxic.  most likely has Graves disease since he doesn't have large thyroid nodules on exam.  Start methimazole 10mg q8h, continue beta blocker.   Send for TPO, Tg Ab, TSH Rec Ab, TSI (thyroid stim immunoglobulin), T3, free T4. Pt seen at bedside with fellow.    Extremely high calcium, CKD.    Stroke 4 weeks ago, but had other strokes in the past, with L hemiparesis, slowed speech (? baseline).  Per patient, he was supposed to have surgery (parathyroidectomy?) but then had stroke. Pt having polyuria, polydipsia, no GI symptoms.   Continue iv fluids, calcitonin, give dose of iv furosemide 40mg, and then recheck calcium in 2-3 hours.   If calcium < 14.0, would  hold off on giving bisphosphonate, hayes given low GFR.   Explained to pt that he needs surgery (parathyroidectomy) because his calcium level is dangerously high, which can cause arrhythmias.

## 2022-10-09 NOTE — PROGRESS NOTE ADULT - PROBLEM SELECTOR PLAN 9
F: NS @ 200cc/hr  E: Replete as necessary  DVT PPx: home Xarelto  Dispo: [facility] F: NS @ 250cc/hr  E: Replete as necessary  DVT PPx: home Xarelto  Dispo: 7[facility]

## 2022-10-09 NOTE — PROGRESS NOTE ADULT - PROBLEM SELECTOR PLAN 7
Has to be seen.   Especially with recurrent.      Patient takes Rosuvastatin 40mg qd.     - c/w rosuvastatin

## 2022-10-09 NOTE — PROGRESS NOTE ADULT - PROBLEM SELECTOR PLAN 1
Patient presenting w/ calcium of 13.1. Last Ca 9/3 11 at [facility]. Per HIE the patient was found to have a paratracheal nodule measuring 2.4 x 2.7 cm, stable compared to prior imaging a few years prior. He is pending a biopsy for this. He was supposed to have the biopsy in late september and was off Xarelto for 3 days for the procedure when he developed recurrent right-sided tingling. Biopsy was canceled and he was placed back on his antithrombotic therapy. Patient on Lisinopril-Hydrochlorothiazide 20-12/5mg daily. Differential: Sarcoid, Multiple Myeloma, malignancy, and HCTZ use.     - Start NS 200cc/hr   - If serum calcium not improving, can try calcitonin 4units/kg subQ and repeat calcium Q4-6hrs   - f/u serum PTH, PTH-related peptide, Vitamin D-1,25, Vitamin D-25-Hydroxy, and Ionized Calcium  - f/u paraneoplastic autoantibody evaluation  - endocrine consult in am  - f/u CT Chest  - strict I and Os Patient presenting w/ calcium of 13.1. Last Ca 9/3 11 at [facility].   - Patient on Lisinopril-Hydrochlorothiazide 20-12/5mg daily.   - Differential: Sarcoid, Multiple Myeloma, malignancy, and HCTZ use.   - s/p 370u calcitonin  - , Vit D, 25 40.8.   - Consulted endocrine, will f/u recs    - Continue NS 250cc/hr  - f/u serum PTH-related peptide, Vitamin D-1,25, and Ionized Calcium  - f/u paraneoplastic autoantibody evaluation  - f/u CT Chest  - strict I and Os Patient presenting w/ calcium of 13.1. Last Ca 9/3 11 at [facility].   - Patient on Lisinopril-Hydrochlorothiazide 20-12/5mg daily.   - Differential: Sarcoid, Multiple Myeloma, malignancy, and HCTZ use.   - s/p 370u calcitonin  - , Vit D, 25 40.8.     - endocrine following will f/u recs  - per endo, check Calcium q12h  - Continue NS 250cc/hr (consider IV lasix 40mg IVP once if needed)  - f/u serum PTH-related peptide, Vitamin D-1,25, and Ionized Calcium  - f/u paraneoplastic autoantibody evaluation  - f/u CT Chest  - strict I and Os

## 2022-10-09 NOTE — PROGRESS NOTE ADULT - PROBLEM SELECTOR PLAN 8
No known home meds.     - f/u A1c No known home meds.   - A1c 6.3  - continue to monitor blood glucose, start ISS if needed

## 2022-10-09 NOTE — PROGRESS NOTE ADULT - PROBLEM SELECTOR PLAN 6
Surgery scheduled on 08/21/18 at 0900.  Closing encounter   Home med Levothyroxine 25mcg qd.    - c/w home med

## 2022-10-09 NOTE — H&P ADULT - PROBLEM SELECTOR PLAN 3
Leukocytosis to 12.27 on admission. Patient presenting w/ 3-4 days of nausea and vomiting. Possibly reactive leukocytosis iso dehydration and decreased po intake.    - f/u am CBC  - trend CBC

## 2022-10-09 NOTE — ED PROVIDER NOTE - OBJECTIVE STATEMENT
52M PMH HTN, DM, HLD, ILR, CVA (most recently admitted ~4w ago, admitted at [facility], R weakness. slurred speech) p/w 3-4d of multiple NBNB NV. Mild rhinorrhea and diarrhea. No other systemic symptoms.   Denies HA, confusion, f/c, cough, sore throat, SOB, CP, abd pain, urinary symptoms, new weakness/numbness, black/bloody stool, rashes.  Has been unable to keep down meds (includes, amlodipne, lisinopril/HCTZ, asa/xarelto).

## 2022-10-09 NOTE — H&P ADULT - PROBLEM SELECTOR PLAN 1
Patient presenting w/ calcium of 13.1. Per HIE the patient was found to have a paratracheal nodule measuring 2.4 x 2.7 cm, stable compared to prior imaging a few years prior. He is pending a biopsy for this. In fact, he was said to have the biopsy in late september and was off Xarelto for 3 days for the procedure when he developed recurrent right-sided tingling. Biopsy was canceled and he was placed back on his antithrombotic therapy. Patient on Lisinopril-Hydrochlorothiazide 20-12/5mg daily. Differential: Sarcoid, Multiple Myeloma, malignancy,     - f/u serum PTH, PTH-related peptide, Vitamin D-1,25, Vitamin D-25-Hydroxy, and Ionized Calcium  - f/u paraneoplastic autoantibody evaluation  - endocrine consult in am  - f/u CT Chest  - strict I and Os Patient presenting w/ calcium of 13.1. Per HIE the patient was found to have a paratracheal nodule measuring 2.4 x 2.7 cm, stable compared to prior imaging a few years prior. He is pending a biopsy for this. He was supposed to have the biopsy in late september and was off Xarelto for 3 days for the procedure when he developed recurrent right-sided tingling. Biopsy was canceled and he was placed back on his antithrombotic therapy. Patient on Lisinopril-Hydrochlorothiazide 20-12/5mg daily. Differential: Sarcoid, Multiple Myeloma, malignancy, and HCTZ use.     - Start NS 200cc/hr   - If serum calcium not improving, can try calcitonin 4units/kg subQ and repeat calcium Q4-6hrs   - f/u serum PTH, PTH-related peptide, Vitamin D-1,25, Vitamin D-25-Hydroxy, and Ionized Calcium  - f/u paraneoplastic autoantibody evaluation  - endocrine consult in am  - f/u CT Chest  - strict I and Os Patient presenting w/ calcium of 13.1. Last Ca 9/3 11 at [facility]. Per HIE the patient was found to have a paratracheal nodule measuring 2.4 x 2.7 cm, stable compared to prior imaging a few years prior. He is pending a biopsy for this. He was supposed to have the biopsy in late september and was off Xarelto for 3 days for the procedure when he developed recurrent right-sided tingling. Biopsy was canceled and he was placed back on his antithrombotic therapy. Patient on Lisinopril-Hydrochlorothiazide 20-12/5mg daily. Differential: Sarcoid, Multiple Myeloma, malignancy, and HCTZ use.     - Start NS 200cc/hr   - If serum calcium not improving, can try calcitonin 4units/kg subQ and repeat calcium Q4-6hrs   - f/u serum PTH, PTH-related peptide, Vitamin D-1,25, Vitamin D-25-Hydroxy, and Ionized Calcium  - f/u paraneoplastic autoantibody evaluation  - endocrine consult in am  - f/u CT Chest  - strict I and Os

## 2022-10-09 NOTE — PROGRESS NOTE ADULT - PROBLEM SELECTOR PLAN 2
Patient presenting with BUN/Cr ratio: 25/3.34. Concern for pre-renal iso 3-4 days of nausea and vomiting. Possibly pre-renal iso dehydration and decreased po intake. Per [facility] labs 9/3- BUN/Cr 18/1.55; Likely ARPITA on CKD. Differential: Pre-renal vs ATN iso hypercalcemia.     - collateral on baseline creatinine w/ PCP  - SPEP and UPEP workup in am  - f/u UA and urine lytes  - gibson w/ strict I and Os  - consider renal consult Patient presenting with BUN/Cr ratio: 25/3.34.   - Per [facility] labs 9/3- BUN/Cr 18/1.55; Likely ARPITA on CKD.   - FeUrea 100%, suggests intra-renal  - FeNa 5.2%, suggests post-renal, obstructive    - collateral on baseline creatinine w/ PCP  - f/u SPEP and UPEP workup in am  - gibson w/ strict I and Os  - consider renal consult

## 2022-10-09 NOTE — ED PROVIDER NOTE - PHYSICAL EXAMINATION
abd: Mild RUQ ttp, no rebound/guarding. rest of abd soft NTND. BS+. no CVAT.   baseline mild R weakness, baseline minimal slurred speech, no LE edema.

## 2022-10-09 NOTE — PROGRESS NOTE ADULT - PROBLEM SELECTOR PLAN 3
Leukocytosis to 12.27 on admission. Patient presenting w/ 3-4 days of nausea and vomiting. Possibly reactive leukocytosis iso dehydration and decreased po intake.    - f/u am CBC  - trend CBC Leukocytosis to 12.27 on admission. Patient presenting w/ 3-4 days of nausea and vomiting. Possibly reactive leukocytosis iso dehydration and decreased po intake.    - WBC 11.12 on 10/9  - continue to trend

## 2022-10-09 NOTE — H&P ADULT - ASSESSMENT
53 yo M w/ PMHx HTN, T2DM, HLD, Hypothyroidism, CVA (most recently admitted ~4w ago, admitted at [facility], R weakness, slurred speech) presents with 3-4d of multiple NBNB nausea and vomiting, found to have a calcium of 17.1, admitted to telemetry for further workup.

## 2022-10-09 NOTE — H&P ADULT - PROBLEM SELECTOR PLAN 2
Patient presenting with BUN/Cr ratio: 25/3.34. Concern for pre-renal iso 3-4 days of nausea and vomiting. Possibly pre-renal iso dehydration and decreased po intake. Differential: Multiple Myeloma iso hypercalcemia and     - collateral on baseline creatinine w/ PCP  - SPEP and UPEP workup in am  - f/u UA and urine lytes Patient presenting with BUN/Cr ratio: 25/3.34. Concern for pre-renal iso 3-4 days of nausea and vomiting. Possibly pre-renal iso dehydration and decreased po intake. Per [facility] labs 9/3- BUN/Cr 18/1.55; Differential: Multiple Myeloma iso hypercalcemia and     - collateral on baseline creatinine w/ PCP  - SPEP and UPEP workup in am  - f/u UA and urine lytes  - gibson w/ strict I and Os Patient presenting with BUN/Cr ratio: 25/3.34. Concern for pre-renal iso 3-4 days of nausea and vomiting. Possibly pre-renal iso dehydration and decreased po intake. Per [facility] labs 9/3- BUN/Cr 18/1.55;     - collateral on baseline creatinine w/ PCP  - SPEP and UPEP workup in am  - f/u UA and urine lytes  - gibson w/ strict I and Os Patient presenting with BUN/Cr ratio: 25/3.34. Concern for pre-renal iso 3-4 days of nausea and vomiting. Possibly pre-renal iso dehydration and decreased po intake. Per [facility] labs 9/3- BUN/Cr 18/1.55; Likely ARPITA on CKD. Differential: Pre-renal vs ATN iso hypercalcemia.     - collateral on baseline creatinine w/ PCP  - SPEP and UPEP workup in am  - f/u UA and urine lytes  - gibson w/ strict I and Os  - consider renal consult

## 2022-10-09 NOTE — H&P ADULT - PROBLEM SELECTOR PLAN 9
F:  E:   DVT PPx: home Xarelto  Dispo: [facility] F: NS @ 200cc/hr  E:   DVT PPx: home Xarelto  Dispo: [facility] F: NS @ 200cc/hr  E: Replete as necessary  DVT PPx: home Xarelto  Dispo: [facility]

## 2022-10-09 NOTE — H&P ADULT - PROBLEM SELECTOR PLAN 5
Patient takes Amlodipine 5mg qd and Lisinopril-hydrochlorothiazide 20-12.5 mg qd.    - c/w amlodipine  - holding Lisinopril-HCTZ as can worsen hypercalcemia

## 2022-10-10 LAB
ALBUMIN SERPL ELPH-MCNC: 3.5 G/DL — SIGNIFICANT CHANGE UP (ref 3.3–5)
ALP SERPL-CCNC: 87 U/L — SIGNIFICANT CHANGE UP (ref 40–120)
ALT FLD-CCNC: 13 U/L — SIGNIFICANT CHANGE UP (ref 10–45)
ANION GAP SERPL CALC-SCNC: 12 MMOL/L — SIGNIFICANT CHANGE UP (ref 5–17)
ANION GAP SERPL CALC-SCNC: 13 MMOL/L — SIGNIFICANT CHANGE UP (ref 5–17)
APPEARANCE UR: CLEAR — SIGNIFICANT CHANGE UP
AST SERPL-CCNC: 17 U/L — SIGNIFICANT CHANGE UP (ref 10–40)
BACTERIA # UR AUTO: PRESENT /HPF
BASOPHILS # BLD AUTO: 0.05 K/UL — SIGNIFICANT CHANGE UP (ref 0–0.2)
BASOPHILS NFR BLD AUTO: 0.4 % — SIGNIFICANT CHANGE UP (ref 0–2)
BILIRUB SERPL-MCNC: 0.6 MG/DL — SIGNIFICANT CHANGE UP (ref 0.2–1.2)
BILIRUB UR-MCNC: NEGATIVE — SIGNIFICANT CHANGE UP
BLD GP AB SCN SERPL QL: NEGATIVE — SIGNIFICANT CHANGE UP
BUN SERPL-MCNC: 23 MG/DL — SIGNIFICANT CHANGE UP (ref 7–23)
BUN SERPL-MCNC: 25 MG/DL — HIGH (ref 7–23)
CA-I BLD-SCNC: 8 MG/DL — HIGH (ref 4.5–5.6)
CALCIUM SERPL-MCNC: 14.8 MG/DL — CRITICAL HIGH (ref 8.4–10.5)
CALCIUM SERPL-MCNC: 16.1 MG/DL — CRITICAL HIGH (ref 8.4–10.5)
CHLORIDE SERPL-SCNC: 106 MMOL/L — SIGNIFICANT CHANGE UP (ref 96–108)
CHLORIDE SERPL-SCNC: 108 MMOL/L — SIGNIFICANT CHANGE UP (ref 96–108)
CO2 SERPL-SCNC: 19 MMOL/L — LOW (ref 22–31)
CO2 SERPL-SCNC: 20 MMOL/L — LOW (ref 22–31)
COLOR SPEC: YELLOW — SIGNIFICANT CHANGE UP
CREAT SERPL-MCNC: 3.46 MG/DL — HIGH (ref 0.5–1.3)
CREAT SERPL-MCNC: 3.7 MG/DL — HIGH (ref 0.5–1.3)
DIFF PNL FLD: ABNORMAL
EGFR: 19 ML/MIN/1.73M2 — LOW
EGFR: 21 ML/MIN/1.73M2 — LOW
EOSINOPHIL # BLD AUTO: 0.06 K/UL — SIGNIFICANT CHANGE UP (ref 0–0.5)
EOSINOPHIL NFR BLD AUTO: 0.5 % — SIGNIFICANT CHANGE UP (ref 0–6)
EPI CELLS # UR: SIGNIFICANT CHANGE UP /HPF (ref 0–5)
GLUCOSE SERPL-MCNC: 113 MG/DL — HIGH (ref 70–99)
GLUCOSE SERPL-MCNC: 97 MG/DL — SIGNIFICANT CHANGE UP (ref 70–99)
GLUCOSE UR QL: NEGATIVE — SIGNIFICANT CHANGE UP
HCT VFR BLD CALC: 43.9 % — SIGNIFICANT CHANGE UP (ref 39–50)
HGB BLD-MCNC: 15.1 G/DL — SIGNIFICANT CHANGE UP (ref 13–17)
IMM GRANULOCYTES NFR BLD AUTO: 0.2 % — SIGNIFICANT CHANGE UP (ref 0–0.9)
KETONES UR-MCNC: 15 MG/DL
LEUKOCYTE ESTERASE UR-ACNC: NEGATIVE — SIGNIFICANT CHANGE UP
LYMPHOCYTES # BLD AUTO: 1.82 K/UL — SIGNIFICANT CHANGE UP (ref 1–3.3)
LYMPHOCYTES # BLD AUTO: 14.6 % — SIGNIFICANT CHANGE UP (ref 13–44)
MAGNESIUM SERPL-MCNC: 2.2 MG/DL — SIGNIFICANT CHANGE UP (ref 1.6–2.6)
MCHC RBC-ENTMCNC: 29.6 PG — SIGNIFICANT CHANGE UP (ref 27–34)
MCHC RBC-ENTMCNC: 34.4 GM/DL — SIGNIFICANT CHANGE UP (ref 32–36)
MCV RBC AUTO: 86.1 FL — SIGNIFICANT CHANGE UP (ref 80–100)
MONOCYTES # BLD AUTO: 1.09 K/UL — HIGH (ref 0–0.9)
MONOCYTES NFR BLD AUTO: 8.7 % — SIGNIFICANT CHANGE UP (ref 2–14)
NEUTROPHILS # BLD AUTO: 9.42 K/UL — HIGH (ref 1.8–7.4)
NEUTROPHILS NFR BLD AUTO: 75.6 % — SIGNIFICANT CHANGE UP (ref 43–77)
NITRITE UR-MCNC: NEGATIVE — SIGNIFICANT CHANGE UP
NRBC # BLD: 0 /100 WBCS — SIGNIFICANT CHANGE UP (ref 0–0)
PH UR: 6 — SIGNIFICANT CHANGE UP (ref 5–8)
PHOSPHATE SERPL-MCNC: 3.7 MG/DL — SIGNIFICANT CHANGE UP (ref 2.5–4.5)
PLATELET # BLD AUTO: 257 K/UL — SIGNIFICANT CHANGE UP (ref 150–400)
POTASSIUM SERPL-MCNC: 3.8 MMOL/L — SIGNIFICANT CHANGE UP (ref 3.5–5.3)
POTASSIUM SERPL-MCNC: 4 MMOL/L — SIGNIFICANT CHANGE UP (ref 3.5–5.3)
POTASSIUM SERPL-SCNC: 3.8 MMOL/L — SIGNIFICANT CHANGE UP (ref 3.5–5.3)
POTASSIUM SERPL-SCNC: 4 MMOL/L — SIGNIFICANT CHANGE UP (ref 3.5–5.3)
PROT ?TM UR-MCNC: 171 MG/DL — HIGH (ref 0–12)
PROT SERPL-MCNC: 6.1 G/DL — SIGNIFICANT CHANGE UP (ref 6–8.3)
PROT UR-MCNC: 100 MG/DL
RBC # BLD: 5.1 M/UL — SIGNIFICANT CHANGE UP (ref 4.2–5.8)
RBC # FLD: 12.9 % — SIGNIFICANT CHANGE UP (ref 10.3–14.5)
RBC CASTS # UR COMP ASSIST: ABNORMAL /HPF
RH IG SCN BLD-IMP: POSITIVE — SIGNIFICANT CHANGE UP
SODIUM SERPL-SCNC: 138 MMOL/L — SIGNIFICANT CHANGE UP (ref 135–145)
SODIUM SERPL-SCNC: 140 MMOL/L — SIGNIFICANT CHANGE UP (ref 135–145)
SP GR SPEC: >=1.03 — SIGNIFICANT CHANGE UP (ref 1–1.03)
UROBILINOGEN FLD QL: 0.2 E.U./DL — SIGNIFICANT CHANGE UP
VIT D25+D1,25 OH+D1,25 PNL SERPL-MCNC: 27.5 PG/ML — SIGNIFICANT CHANGE UP (ref 19.9–79.3)
WBC # BLD: 12.47 K/UL — HIGH (ref 3.8–10.5)
WBC # FLD AUTO: 12.47 K/UL — HIGH (ref 3.8–10.5)
WBC UR QL: < 5 /HPF — SIGNIFICANT CHANGE UP

## 2022-10-10 PROCEDURE — 99223 1ST HOSP IP/OBS HIGH 75: CPT | Mod: GC

## 2022-10-10 PROCEDURE — 99233 SBSQ HOSP IP/OBS HIGH 50: CPT | Mod: GC

## 2022-10-10 RX ORDER — SODIUM CHLORIDE 9 MG/ML
3000 INJECTION INTRAMUSCULAR; INTRAVENOUS; SUBCUTANEOUS
Refills: 0 | Status: DISCONTINUED | OUTPATIENT
Start: 2022-10-10 | End: 2022-10-11

## 2022-10-10 RX ORDER — HYDRALAZINE HCL 50 MG
5 TABLET ORAL ONCE
Refills: 0 | Status: COMPLETED | OUTPATIENT
Start: 2022-10-10 | End: 2022-10-10

## 2022-10-10 RX ORDER — POTASSIUM CHLORIDE 20 MEQ
20 PACKET (EA) ORAL ONCE
Refills: 0 | Status: COMPLETED | OUTPATIENT
Start: 2022-10-10 | End: 2022-10-10

## 2022-10-10 RX ORDER — CALCITONIN SALMON 200 [IU]/ML
728 INJECTION, SOLUTION INTRAMUSCULAR EVERY 12 HOURS
Refills: 0 | Status: DISCONTINUED | OUTPATIENT
Start: 2022-10-10 | End: 2022-10-11

## 2022-10-10 RX ADMIN — SODIUM CHLORIDE 250 MILLILITER(S): 9 INJECTION INTRAMUSCULAR; INTRAVENOUS; SUBCUTANEOUS at 14:32

## 2022-10-10 RX ADMIN — ATORVASTATIN CALCIUM 80 MILLIGRAM(S): 80 TABLET, FILM COATED ORAL at 21:47

## 2022-10-10 RX ADMIN — CALCITONIN SALMON 728 INTERNATIONAL UNIT(S): 200 INJECTION, SOLUTION INTRAMUSCULAR at 00:07

## 2022-10-10 RX ADMIN — APIXABAN 2.5 MILLIGRAM(S): 2.5 TABLET, FILM COATED ORAL at 23:25

## 2022-10-10 RX ADMIN — Medication 3 MILLIGRAM(S): at 23:24

## 2022-10-10 RX ADMIN — Medication 25 MICROGRAM(S): at 06:00

## 2022-10-10 RX ADMIN — CALCITONIN SALMON 728 INTERNATIONAL UNIT(S): 200 INJECTION, SOLUTION INTRAMUSCULAR at 23:25

## 2022-10-10 RX ADMIN — Medication 81 MILLIGRAM(S): at 11:08

## 2022-10-10 RX ADMIN — AMLODIPINE BESYLATE 5 MILLIGRAM(S): 2.5 TABLET ORAL at 06:00

## 2022-10-10 RX ADMIN — Medication 650 MILLIGRAM(S): at 00:31

## 2022-10-10 RX ADMIN — CALCITONIN SALMON 728 INTERNATIONAL UNIT(S): 200 INJECTION, SOLUTION INTRAMUSCULAR at 11:08

## 2022-10-10 RX ADMIN — Medication 20 MILLIEQUIVALENT(S): at 07:37

## 2022-10-10 RX ADMIN — Medication 5 MILLIGRAM(S): at 16:54

## 2022-10-10 RX ADMIN — Medication 650 MILLIGRAM(S): at 01:34

## 2022-10-10 RX ADMIN — APIXABAN 2.5 MILLIGRAM(S): 2.5 TABLET, FILM COATED ORAL at 11:07

## 2022-10-10 NOTE — PROGRESS NOTE ADULT - PROBLEM SELECTOR PLAN 3
Leukocytosis to 12.27 on admission. Patient presenting w/ 3-4 days of nausea and vomiting. Possibly reactive leukocytosis iso dehydration and decreased po intake.    - WBC 11.12 on 10/9  - continue to trend Leukocytosis to 12.27 on admission. Patient presenting w/ 3-4 days of nausea and vomiting. Possibly reactive leukocytosis iso dehydration and decreased po intake.    - WBC still elevated with neutrophilic predominance, no signs of acute infection  - continue to trend

## 2022-10-10 NOTE — CONSULT NOTE ADULT - SUBJECTIVE AND OBJECTIVE BOX
HPI:  Pt is a 52-years-old male with a past medical history of type 2 diabetes mellitus, hypertension, hyperlipidemia and cerebrovascular accidents (last one ~4 weeks ago, with residual right sided weakness and slurred speech) who presented to the emergency department complaining of nausea and multiple episodes of non-bloody non-bilious vomiting for the past ~4 days. In addition, per family, he appeared to be more lethargic and "not himself" for the past few weeks. Labs were remarkable for hypercalcemia (17.1 mg/dL) and acute kidney injury (BUN 25 / Cr 3.34 mg/dL). Patient denied taking any calcium supplements, but mentioned taking lisinopril-HCTZ at home for his hypertension. He was admitted for further management of hypercalcemia. Patient received 2 liters of NS bolus and was started on maintenance fluids with sodium chloride 0.9% at 250 mL/hr. He also received calcitonin 4U/kg once. Nephrology consulted for ARPITA.     In the ED:  Initial vital signs: T: 98.9 degrees F, HR: 59, BP: 175/111, R: 18, SpO2: 96% on RA  Labs: significant for WBC 12.27, Hgb 17.3, hypercalcemia 17.1, BUN 25, Cr 3.34,   Imaging: US Abd :No cholelithiasis or cholecystitis.  EKG: Sinus rhythm w/ PVCs, normal QTc  Medications: 2L NS Bolus, Zofran 4mg   Consults: none  (09 Oct 2022 01:41)      PAST MEDICAL & SURGICAL HISTORY:  Stroke      HTN (hypertension)      Diabetes      Hypothyroidism      Hyperlipidemia            Allergies:  No Known Allergies      Home Medications:   amLODIPine 5 mg oral tablet: 1 tab(s) orally once a day  aspirin 81 mg oral delayed release tablet: 1 tab(s) orally once a day  ezetimibe 10 mg oral tablet: 1 tab(s) orally once a day  gabapentin 100 mg oral capsule: 1 cap(s) orally 3 times a day  levothyroxine 25 mcg (0.025 mg) oral tablet: 1 tab(s) orally once a day  lisinopril-hydrochlorothiazide 20 mg-12.5 mg oral tablet: 1 tab(s) orally once a day  rosuvastatin 40 mg oral tablet: 1 tab(s) orally once a day  Xarelto 20 mg oral tablet: 1 tab(s) orally once a day (in the evening)      Hospital Medications:   MEDICATIONS  (STANDING):  amLODIPine   Tablet 5 milliGRAM(s) Oral daily  apixaban 2.5 milliGRAM(s) Oral every 12 hours  aspirin enteric coated 81 milliGRAM(s) Oral daily  atorvastatin 80 milliGRAM(s) Oral at bedtime  levothyroxine 25 MICROGram(s) Oral daily  sodium chloride 0.9%. 3000 milliLiter(s) (250 mL/Hr) IV Continuous <Continuous>      SOCIAL HISTORY:  Denies ETOh, Smoking    Family History:  FAMILY HISTORY:        VITALS:  T(F): 98.5 (10-10-22 @ 10:28), Max: 98.9 (10-09-22 @ 21:46)  HR: 94 (10-10-22 @ 12:47)  BP: 167/100 (10-10-22 @ 12:47)  RR: 18 (10-10-22 @ 12:47)  SpO2: 96% (10-10-22 @ 12:47)  Wt(kg): --    10-09 @ 07:01  -  10-10 @ 07:00  --------------------------------------------------------  IN: 2580 mL / OUT: 3800 mL / NET: -1220 mL    10-10 @ 07:01  -  10-10 @ 13:57  --------------------------------------------------------  IN: 0 mL / OUT: 400 mL / NET: -400 mL        CAPILLARY BLOOD GLUCOSE          Review of Systems:  Negative except as mentioned in HPI    PHYSICAL EXAM:  General: NAD, AAOX3  Cardiovascular: S1, S2 normal; RRR, no M/G/R  Respiratory: CTABL; no W/R/R  Gastrointestinal: soft. +mild, diffuse tenderness. ND. no rebound or guarding.   Extremities: WWP; no edema, clubbing or cyanosis  Neurological: AAOx3; No focal deficits noted    LABS:  10-10    140  |  108  |  23  ----------------------------<  97  3.8   |  20<L>  |  3.46<H>    Ca    14.8<HH>      10 Oct 2022 05:30  Phos  3.7     10-10  Mg     2.2     10-10    TPro  6.1  /  Alb  3.5  /  TBili  0.6  /  DBili      /  AST  17  /  ALT  13  /  AlkPhos  87  10-10    Creatinine Trend: 3.46 <--, 3.17 <--, 3.24 <--, 3.34 <--                        15.1   12.47 )-----------( 257      ( 10 Oct 2022 05:30 )             43.9     Urine Studies:  Urinalysis Basic - ( 10 Oct 2022 05:16 )    Color: Yellow / Appearance: Clear / SG: >=1.030 / pH:   Gluc:  / Ketone: 15 mg/dL  / Bili: Negative / Urobili: 0.2 E.U./dL   Blood:  / Protein: 100 mg/dL / Nitrite: NEGATIVE   Leuk Esterase: NEGATIVE / RBC: Many /HPF / WBC < 5 /HPF   Sq Epi:  / Non Sq Epi: 0-5 /HPF / Bacteria: Present /HPF      Creatinine, Random Urine: 42 mg/dL (10-09 @ 07:47)  Osmolality, Random Urine: 313 mosm/kg (10-09 @ 07:47)  Sodium, Random Urine: 97 mmol/L (10-09 @ 07:47)          Assessment/Plan:     #ARPITA   normal baseline creatinine ~1, likely peaking ~2.3   likely etiology toxic ATN vs AIN secondary to combination vanco/zosyn or contrast, less likely ischemic ATN given no documented hypotensive episodes, less likely pre-renal given FeNa suggestive of intrinsic injury     Recommend:   repeat CXR   commence 40cc/h NS IVF   BID BMP + urine lytes   renal sono   strict I/Os   renal diet   avoid nephrotoxic meds     Thank you for the opportunity to participate in the care of your patient. The nephrology service remains available to assist with any questions or concerns. Please feel free to reach us by paging the on-call nephrology fellow for urgent issues or as below.     Watson Fletcher M.D.  PGY 4 - Nephrology Fellow  [phone]     HPI:  Pt is a 52-years-old male with a past medical history of type 2 diabetes mellitus, hypertension, hyperlipidemia and cerebrovascular accidents (last one ~4 weeks ago, with residual right sided weakness and slurred speech) who presented to the emergency department complaining of nausea and multiple episodes of non-bloody non-bilious vomiting for the past ~4 days. In addition, per family, he appeared to be more lethargic and "not himself" for the past few weeks. Labs were remarkable for hypercalcemia (17.1 mg/dL) and acute kidney injury (BUN 25 / Cr 3.34 mg/dL). Patient denied taking any calcium supplements, but mentioned taking lisinopril-HCTZ at home for his hypertension. He was admitted for further management of hypercalcemia. Patient received 2 liters of NS bolus and was started on maintenance fluids with sodium chloride 0.9% at 250 mL/hr. He also received calcitonin 4U/kg once. Nephrology consulted for ARPITA.     In the ED:  Initial vital signs: T: 98.9 degrees F, HR: 59, BP: 175/111, R: 18, SpO2: 96% on RA  Labs: significant for WBC 12.27, Hgb 17.3, hypercalcemia 17.1, BUN 25, Cr 3.34,   Imaging: US Abd :No cholelithiasis or cholecystitis.  EKG: Sinus rhythm w/ PVCs, normal QTc  Medications: 2L NS Bolus, Zofran 4mg   Consults: none  (09 Oct 2022 01:41)      PAST MEDICAL & SURGICAL HISTORY:  Stroke      HTN (hypertension)      Diabetes      Hypothyroidism      Hyperlipidemia            Allergies:  No Known Allergies      Home Medications:   amLODIPine 5 mg oral tablet: 1 tab(s) orally once a day  aspirin 81 mg oral delayed release tablet: 1 tab(s) orally once a day  ezetimibe 10 mg oral tablet: 1 tab(s) orally once a day  gabapentin 100 mg oral capsule: 1 cap(s) orally 3 times a day  levothyroxine 25 mcg (0.025 mg) oral tablet: 1 tab(s) orally once a day  lisinopril-hydrochlorothiazide 20 mg-12.5 mg oral tablet: 1 tab(s) orally once a day  rosuvastatin 40 mg oral tablet: 1 tab(s) orally once a day  Xarelto 20 mg oral tablet: 1 tab(s) orally once a day (in the evening)      Hospital Medications:   MEDICATIONS  (STANDING):  amLODIPine   Tablet 5 milliGRAM(s) Oral daily  apixaban 2.5 milliGRAM(s) Oral every 12 hours  aspirin enteric coated 81 milliGRAM(s) Oral daily  atorvastatin 80 milliGRAM(s) Oral at bedtime  levothyroxine 25 MICROGram(s) Oral daily  sodium chloride 0.9%. 3000 milliLiter(s) (250 mL/Hr) IV Continuous <Continuous>      SOCIAL HISTORY:  Denies ETOh, Smoking    Family History:  FAMILY HISTORY:        VITALS:  T(F): 98.5 (10-10-22 @ 10:28), Max: 98.9 (10-09-22 @ 21:46)  HR: 94 (10-10-22 @ 12:47)  BP: 167/100 (10-10-22 @ 12:47)  RR: 18 (10-10-22 @ 12:47)  SpO2: 96% (10-10-22 @ 12:47)  Wt(kg): --    10-09 @ 07:01  -  10-10 @ 07:00  --------------------------------------------------------  IN: 2580 mL / OUT: 3800 mL / NET: -1220 mL    10-10 @ 07:01  -  10-10 @ 13:57  --------------------------------------------------------  IN: 0 mL / OUT: 400 mL / NET: -400 mL        CAPILLARY BLOOD GLUCOSE          Review of Systems:  Negative except as mentioned in HPI    PHYSICAL EXAM:  General: NAD, AAOX3  Cardiovascular: S1, S2 normal; RRR, no M/G/R  Respiratory: CTABL; no W/R/R  Gastrointestinal: soft. +mild, diffuse tenderness. ND. no rebound or guarding.   Extremities: WWP; no edema, clubbing or cyanosis  Neurological: AAOx3; No focal deficits noted    LABS:  10-10    140  |  108  |  23  ----------------------------<  97  3.8   |  20<L>  |  3.46<H>    Ca    14.8<HH>      10 Oct 2022 05:30  Phos  3.7     10-10  Mg     2.2     10-10    TPro  6.1  /  Alb  3.5  /  TBili  0.6  /  DBili      /  AST  17  /  ALT  13  /  AlkPhos  87  10-10    Creatinine Trend: 3.46 <--, 3.17 <--, 3.24 <--, 3.34 <--                        15.1   12.47 )-----------( 257      ( 10 Oct 2022 05:30 )             43.9     Urine Studies:  Urinalysis Basic - ( 10 Oct 2022 05:16 )    Color: Yellow / Appearance: Clear / SG: >=1.030 / pH:   Gluc:  / Ketone: 15 mg/dL  / Bili: Negative / Urobili: 0.2 E.U./dL   Blood:  / Protein: 100 mg/dL / Nitrite: NEGATIVE   Leuk Esterase: NEGATIVE / RBC: Many /HPF / WBC < 5 /HPF   Sq Epi:  / Non Sq Epi: 0-5 /HPF / Bacteria: Present /HPF      Creatinine, Random Urine: 42 mg/dL (10-09 @ 07:47)  Osmolality, Random Urine: 313 mosm/kg (10-09 @ 07:47)  Sodium, Random Urine: 97 mmol/L (10-09 @ 07:47)

## 2022-10-10 NOTE — PROGRESS NOTE ADULT - TIME BILLING
Patient seen and examined with house-staff during bedside rounds.  Resident note read, including vitals, physical findings, laboratory data, and radiological reports.   Revisions included below.  Direct personal management at bed side and extensive interpretation of the data.  Plan was outlined and discussed in details with the housestaff.  Decision making of high complexity  Action taken for acute disease activity to reflect the level of care provided:  - medication reconciliation  - review laboratory data  he is stable.  ca is lower but not significantly.  PTH is elevated.  He got calcitonin and for 2 more doses.  Renal consult.  ENT Dr. Toby Nelson to evaluate patient.  His baseline Cr is normal.  Follow on ECHO.  He was told he has a lung lesion and was supposed to have Bx.  CT scan neck and chest.

## 2022-10-10 NOTE — PROGRESS NOTE ADULT - PROBLEM SELECTOR PLAN 9
F: NS @ 250cc/hr  E: Replete as necessary  DVT PPx: home Xarelto  Dispo: [facility] F: NS @ 250cc/hr  E: Replete as necessary  DVT PPx: Eliquis 2.5mg BID  Dispo: [facility]

## 2022-10-10 NOTE — PROGRESS NOTE ADULT - PROBLEM SELECTOR PLAN 4
Per HIE: History of prior strokes in 2018, 2019. Home meds Xarelto, ASA 81mg, Atorvastatin 80mg, & Gabapentin for tingling/numbness  - previous MRI of the brain showed acute ischemia in the left medulla (pyramid) and also the left jose, chronic appearing infarcts in the right PCA, right basal ganglia, and left corona radiata.   - Repeat MRI showed the left medullary/pyramidal acute/subacute stroke previously seen, enlargement of the left pontine stroke, and a new small right frontal subcortical infarct.     - c/w xarelto, aspirin, and high intensity statin therapy  - hold Gabapentin iso ARPITA Per HIE: History of prior strokes in 2018, 2019. Home meds Xarelto, ASA 81mg, Atorvastatin 80mg, & Gabapentin for tingling/numbness  - switched xarelto to eliquis 2.5mg BID, per Renal recs     - c/w eliquis 2.5mg BID, aspirin, and high intensity statin therapy  - hold Gabapentin iso ARPITA

## 2022-10-10 NOTE — PROGRESS NOTE ADULT - SUBJECTIVE AND OBJECTIVE BOX
OVERNIGHT EVENTS:    SUBJECTIVE / INTERVAL HPI: Patient seen and examined at bedside. No complaints at this time. Patient denies: fever, chills, dizziness, weakness, HA, Changes in vision, CP, palpitations, SOB, cough, N/V/D/C, dysuria, changes in bowel movements, LE edema. ROS otherwise negative.    VITAL SIGNS:  Vital Signs Last 24 Hrs  T(C): 37.2 (09 Oct 2022 21:46), Max: 37.2 (09 Oct 2022 21:46)  T(F): 98.9 (09 Oct 2022 21:46), Max: 98.9 (09 Oct 2022 21:46)  HR: 93 (10 Oct 2022 05:25) (85 - 112)  BP: 166/94 (10 Oct 2022 05:25) (135/84 - 187/105)  BP(mean): 122 (10 Oct 2022 05:25) (104 - 136)  RR: 21 (10 Oct 2022 05:25) (14 - 28)  SpO2: 95% (10 Oct 2022 05:25) (90% - 95%)    Parameters below as of 10 Oct 2022 05:25  Patient On (Oxygen Delivery Method): room air        PHYSICAL EXAM:    General: NAD  HEENT: NCAT  Neck: supple, trachea midline  Cardiovascular: S1, S2 normal; RRR, no M/G/R  Respiratory: CTABL; no W/R/R  Gastrointestinal: soft. +mild, diffuse tenderness. ND. no rebound or guarding.   Skin: no ulcerations or visible rashes appreciated  Extremities: WWP; no edema, clubbing or cyanosis  Vascular: 2+ DP pulses.   Neurological: AAOx3; CN II-XII grossly intact; no focal deficits      MEDICATIONS:  MEDICATIONS  (STANDING):  amLODIPine   Tablet 5 milliGRAM(s) Oral daily  apixaban 2.5 milliGRAM(s) Oral every 12 hours  aspirin enteric coated 81 milliGRAM(s) Oral daily  atorvastatin 80 milliGRAM(s) Oral at bedtime  calcitonin Injectable 728 International Unit(s) IntraMuscular every 12 hours  levothyroxine 25 MICROGram(s) Oral daily  sodium chloride 0.9%. 3000 milliLiter(s) (250 mL/Hr) IV Continuous <Continuous>    MEDICATIONS  (PRN):  acetaminophen     Tablet .. 650 milliGRAM(s) Oral every 6 hours PRN Temp greater or equal to 38C (100.4F), Mild Pain (1 - 3)  aluminum hydroxide/magnesium hydroxide/simethicone Suspension 30 milliLiter(s) Oral every 6 hours PRN Dyspepsia  melatonin 3 milliGRAM(s) Oral at bedtime PRN Insomnia      ALLERGIES:  Allergies    No Known Allergies    Intolerances        LABS:                        15.1   12.47 )-----------( 257      ( 10 Oct 2022 05:30 )             43.9     10-09    142  |  108  |  22  ----------------------------<  106<H>  3.9   |  19<L>  |  3.17<H>    Ca    15.1<HH>      09 Oct 2022 18:11  Phos  3.4     10-09  Mg     1.6     10-09    TPro  6.3  /  Alb  3.5  /  TBili  0.6  /  DBili  x   /  AST  21  /  ALT  17  /  AlkPhos  89  10-09    PT/INR - ( 09 Oct 2022 05:30 )   PT: 22.0 sec;   INR: 1.84          PTT - ( 09 Oct 2022 05:30 )  PTT:43.7 sec  Urinalysis Basic - ( 10 Oct 2022 05:16 )    Color: Yellow / Appearance: Clear / SG: >=1.030 / pH: x  Gluc: x / Ketone: 15 mg/dL  / Bili: Negative / Urobili: 0.2 E.U./dL   Blood: x / Protein: 100 mg/dL / Nitrite: NEGATIVE   Leuk Esterase: NEGATIVE / RBC: x / WBC x   Sq Epi: x / Non Sq Epi: x / Bacteria: x      CAPILLARY BLOOD GLUCOSE          RADIOLOGY & ADDITIONAL TESTS: Reviewed. SUBJECTIVE / INTERVAL HPI: Patient seen and examined at bedside. Nausea has improved. Still has some tingling in both hands. No other complaints. No chest pain, shortness of breath, fever, or chills.     VITAL SIGNS:  Vital Signs Last 24 Hrs  T(C): 37.2 (09 Oct 2022 21:46), Max: 37.2 (09 Oct 2022 21:46)  T(F): 98.9 (09 Oct 2022 21:46), Max: 98.9 (09 Oct 2022 21:46)  HR: 93 (10 Oct 2022 05:25) (85 - 112)  BP: 166/94 (10 Oct 2022 05:25) (135/84 - 187/105)  BP(mean): 122 (10 Oct 2022 05:25) (104 - 136)  RR: 21 (10 Oct 2022 05:25) (14 - 28)  SpO2: 95% (10 Oct 2022 05:25) (90% - 95%)    Parameters below as of 10 Oct 2022 05:25  Patient On (Oxygen Delivery Method): room air    PHYSICAL EXAM:    General: NAD  HEENT: NCAT  Neck: supple, trachea midline  Cardiovascular: S1, S2 normal; RRR, no M/G/R  Respiratory: CTABL; no W/R/R  Gastrointestinal: soft. +mild, diffuse tenderness, improved. ND. no rebound or guarding.   Skin: no ulcerations or visible rashes appreciated  Extremities: WWP; no edema, clubbing or cyanosis  Vascular: 2+ DP pulses.   Neurological: AAOx3; CN II-XII grossly intact; no focal deficits, slightly slurred speech      MEDICATIONS:  MEDICATIONS  (STANDING):  amLODIPine   Tablet 5 milliGRAM(s) Oral daily  apixaban 2.5 milliGRAM(s) Oral every 12 hours  aspirin enteric coated 81 milliGRAM(s) Oral daily  atorvastatin 80 milliGRAM(s) Oral at bedtime  calcitonin Injectable 728 International Unit(s) IntraMuscular every 12 hours  levothyroxine 25 MICROGram(s) Oral daily  sodium chloride 0.9%. 3000 milliLiter(s) (250 mL/Hr) IV Continuous <Continuous>    MEDICATIONS  (PRN):  acetaminophen     Tablet .. 650 milliGRAM(s) Oral every 6 hours PRN Temp greater or equal to 38C (100.4F), Mild Pain (1 - 3)  aluminum hydroxide/magnesium hydroxide/simethicone Suspension 30 milliLiter(s) Oral every 6 hours PRN Dyspepsia  melatonin 3 milliGRAM(s) Oral at bedtime PRN Insomnia      ALLERGIES:  Allergies    No Known Allergies    Intolerances        LABS:                        15.1   12.47 )-----------( 257      ( 10 Oct 2022 05:30 )             43.9     10-09    142  |  108  |  22  ----------------------------<  106<H>  3.9   |  19<L>  |  3.17<H>    Ca    15.1<HH>      09 Oct 2022 18:11  Phos  3.4     10-09  Mg     1.6     10-09    TPro  6.3  /  Alb  3.5  /  TBili  0.6  /  DBili  x   /  AST  21  /  ALT  17  /  AlkPhos  89  10-09    PT/INR - ( 09 Oct 2022 05:30 )   PT: 22.0 sec;   INR: 1.84          PTT - ( 09 Oct 2022 05:30 )  PTT:43.7 sec  Urinalysis Basic - ( 10 Oct 2022 05:16 )    Color: Yellow / Appearance: Clear / SG: >=1.030 / pH: x  Gluc: x / Ketone: 15 mg/dL  / Bili: Negative / Urobili: 0.2 E.U./dL   Blood: x / Protein: 100 mg/dL / Nitrite: NEGATIVE   Leuk Esterase: NEGATIVE / RBC: x / WBC x   Sq Epi: x / Non Sq Epi: x / Bacteria: x      CAPILLARY BLOOD GLUCOSE          RADIOLOGY & ADDITIONAL TESTS: Reviewed.

## 2022-10-10 NOTE — PROGRESS NOTE ADULT - SUBJECTIVE AND OBJECTIVE BOX
SUBJECTIVE / OVERNIGHT EVENTS  Patient was seen and examined this morning. There were no acute events overnight. Patient was continued on calcitonin 4U/kg for 2 additional doses. His calcium level continues to trend down while on fluids (from 17.1 mg/dL yesterday on admission, to 14.1 this morning).     REVIEW OF SYSTEMS  Constitutional:  Negative fever, chills or loss of appetite.  Eyes:  Negative blurry vision or double vision.  Cardiovascular:  Negative for chest pain or palpitations.  Respiratory:  Negative for cough, wheezing, or SOB.   Gastrointestinal:  Negative for nausea, vomiting, diarrhea, constipation, or abdominal pain.  Genitourinary:  Negative frequency, urgency or dysuria.  Neurologic:  No headache, confusion, dizziness, lightheadedness.    PHYSICAL EXAM  Vital Signs Last 24 Hrs  T(C): 37.1 (10 Oct 2022 06:35), Max: 37.2 (09 Oct 2022 21:46)  T(F): 98.7 (10 Oct 2022 06:35), Max: 98.9 (09 Oct 2022 21:46)  HR: 88 (10 Oct 2022 08:46) (85 - 112)  BP: 170/96 (10 Oct 2022 08:46) (135/84 - 187/105)  BP(mean): 122 (10 Oct 2022 08:46) (104 - 136)  RR: 18 (10 Oct 2022 08:46) (14 - 28)  SpO2: 95% (10 Oct 2022 08:46) (90% - 95%)    Parameters below as of 10 Oct 2022 08:46  Patient On (Oxygen Delivery Method): room air    Constitutional: Awake, alert, in no acute distress.   HEENT: Normocephalic, atraumatic, MADY, no proptosis or lid retraction.   Neck: supple, no acanthosis, no thyromegaly or palpable thyroid nodules.  Respiratory: Lungs clear to ausculation bilaterally.   Cardiovascular: regular rhythm, normal S1 and S2, no audible murmurs.   GI: soft, non-tender, non-distended, bowel sounds present, no masses appreciated.  Extremities: No lower extremity edema, peripheral pulses present.   Skin: no rashes.   Psychiatric: AAO x 3. Normal affect/mood.     LABS                        15.1   12.47 )-----------( 257      ( 10 Oct 2022 05:30 )             43.9     140  |  108  |  23  ----------------------------<  97  3.8   |  20<L>  |  3.46<H>    Ca    14.8<HH>      10 Oct 2022 05:30  Phos  3.7     10-10  Mg     2.2     10-10  TPro  6.1  /  Alb  3.5  /  TBili  0.6  /  DBili  x   /  AST  17  /  ALT  13  /  AlkPhos  87  10-10  PT/INR - ( 09 Oct 2022 05:30 )   PT: 22.0 sec;   INR: 1.84     PTT - ( 09 Oct 2022 05:30 )  PTT:43.7 sec    Urinalysis Basic - ( 10 Oct 2022 05:16 )  Color: Yellow / Appearance: Clear / SG: >=1.030 / pH: x  Gluc: x / Ketone: 15 mg/dL  / Bili: Negative / Urobili: 0.2 E.U./dL   Blood: x / Protein: 100 mg/dL / Nitrite: NEGATIVE   Leuk Esterase: NEGATIVE / RBC: Many /HPF / WBC < 5 /HPF   Sq Epi: x / Non Sq Epi: 0-5 /HPF / Bacteria: Present /HPF    CALCIUM TREND  14.8 mg/dL (10/10/22 - 05:30)  15.1 mg/dL (10/9/22 - 18:11)  16.5 mg/dL (10/9/22 - 05:30)  17.1 mg/dL (10/9/22 - 00:07)    MEDICATIONS  MEDICATIONS  (STANDING):  amLODIPine   Tablet 5 milliGRAM(s) Oral daily  apixaban 2.5 milliGRAM(s) Oral every 12 hours  aspirin enteric coated 81 milliGRAM(s) Oral daily  atorvastatin 80 milliGRAM(s) Oral at bedtime  calcitonin Injectable 728 International Unit(s) IntraMuscular every 12 hours  levothyroxine 25 MICROGram(s) Oral daily  sodium chloride 0.9%. 3000 milliLiter(s) (250 mL/Hr) IV Continuous <Continuous>    MEDICATIONS  (PRN):  acetaminophen     Tablet .. 650 milliGRAM(s) Oral every 6 hours PRN Temp greater or equal to 38C (100.4F), Mild Pain (1 - 3)  aluminum hydroxide/magnesium hydroxide/simethicone Suspension 30 milliLiter(s) Oral every 6 hours PRN Dyspepsia  melatonin 3 milliGRAM(s) Oral at bedtime PRN Insomnia    ASSESSMENT / RECOMMENDATIONS  Mr. Edmond is a 52-years-old male with a past medical history of type 2 diabetes mellitus, hypertension, hyperlipidemia and  cerebrovascular accidents who presented to the emergency department complaining of nausea and multiple episodes of non-bloody non-bilious vomiting for the past ~4 days. Per family, he appeared to be more lethargic and "not himself" for the past few weeks. Labs were remarkable for hypercalcemia (17.1 mg/dL) and acute kidney injury.     Upon review of outpatient labs, patient has had hypercalcemia for more than 2 years, last level was 11 mg/dL (9/3/22 at [facility]). Per patient, he has been told that his calcium was high in the past, and that he might need surgery; however, further management of hypercalcemia was postponed due to stroke. PTH was 659 and Vitamin D-OH was 40.8. Hypercalcemia is likely to be secondary to primary hyperparathyroidism.    A1C: 6.2%  Weight: 91 kg  BMI: 32.5  Creatinine: 3.24  GFR: 22    # Hypercalcemia secondary to primary hyperparathyroidism  - Status post calcitonin 4 units/kg once (10/9/22). Overnight, he was ordered for 2 additional doses every 12 hours.   - Continue with IV fluids. Monitor for signs of fluid overload.   - Please monitor calcium every 12 hours for now.  - Discussed with patient that he will eventually need parathyroid surgery which can be done as outpatient once hypercalcemia resolves.     Thank you for allowing us to participate in the care of Mr. Edmond.    Will continue to monitor.       Case discussed with Dr. Fox. Primary team updated.       Sen Henson    Endocrinology Fellow    Service Pager: [phone]    SUBJECTIVE / OVERNIGHT EVENTS  Patient was seen and examined this morning. There were no acute events overnight. Patient was continued on calcitonin 4U/kg for 2 additional doses. His calcium level continues to trend down while on fluids (from 17.1 mg/dL yesterday on admission, to 14.1 this morning).     REVIEW OF SYSTEMS  Constitutional:  Negative fever or chills.  Eyes:  Negative blurry vision or double vision.  Cardiovascular:  Negative for chest pain or palpitations.  Respiratory:  Negative for cough, wheezing, or SOB.   Gastrointestinal:  (+) Nausea, diarrhea. Negative constipation, or abdominal pain.  Neurologic:  No headache, dizziness, lightheadedness.    PHYSICAL EXAM  Vital Signs Last 24 Hrs  T(C): 37.1 (10 Oct 2022 06:35), Max: 37.2 (09 Oct 2022 21:46)  T(F): 98.7 (10 Oct 2022 06:35), Max: 98.9 (09 Oct 2022 21:46)  HR: 88 (10 Oct 2022 08:46) (85 - 112)  BP: 170/96 (10 Oct 2022 08:46) (135/84 - 187/105)  BP(mean): 122 (10 Oct 2022 08:46) (104 - 136)  RR: 18 (10 Oct 2022 08:46) (14 - 28)  SpO2: 95% (10 Oct 2022 08:46) (90% - 95%)    Parameters below as of 10 Oct 2022 08:46  Patient On (Oxygen Delivery Method): room air    Constitutional: Awake, alert, obese male, confused, somnolent, with slurred speech, in no acute distress.   HEENT: Normocephalic, atraumatic, MADY.  Respiratory: Lungs clear to ausculation bilaterally.   Cardiovascular: regular rhythm, normal S1 and S2, no audible murmurs.   GI: soft, non-tender, non-distended, bowel sounds present.  Extremities: No lower extremity edema.  Psychiatric: AAO x 3.     LABS                        15.1   12.47 )-----------( 257      ( 10 Oct 2022 05:30 )             43.9     140  |  108  |  23  ----------------------------<  97  3.8   |  20<L>  |  3.46<H>    Ca    14.8<HH>      10 Oct 2022 05:30  Phos  3.7     10-10  Mg     2.2     10-10  TPro  6.1  /  Alb  3.5  /  TBili  0.6  /  DBili  x   /  AST  17  /  ALT  13  /  AlkPhos  87  10-10  PT/INR - ( 09 Oct 2022 05:30 )   PT: 22.0 sec;   INR: 1.84     PTT - ( 09 Oct 2022 05:30 )  PTT:43.7 sec    Urinalysis Basic - ( 10 Oct 2022 05:16 )  Color: Yellow / Appearance: Clear / SG: >=1.030 / pH: x  Gluc: x / Ketone: 15 mg/dL  / Bili: Negative / Urobili: 0.2 E.U./dL   Blood: x / Protein: 100 mg/dL / Nitrite: NEGATIVE   Leuk Esterase: NEGATIVE / RBC: Many /HPF / WBC < 5 /HPF   Sq Epi: x / Non Sq Epi: 0-5 /HPF / Bacteria: Present /HPF    CALCIUM TREND  14.8 mg/dL (10/10/22 - 05:30)  15.1 mg/dL (10/9/22 - 18:11)  16.5 mg/dL (10/9/22 - 05:30)  17.1 mg/dL (10/9/22 - 00:07)    MEDICATIONS  MEDICATIONS  (STANDING):  amLODIPine   Tablet 5 milliGRAM(s) Oral daily  apixaban 2.5 milliGRAM(s) Oral every 12 hours  aspirin enteric coated 81 milliGRAM(s) Oral daily  atorvastatin 80 milliGRAM(s) Oral at bedtime  calcitonin Injectable 728 International Unit(s) IntraMuscular every 12 hours  levothyroxine 25 MICROGram(s) Oral daily  sodium chloride 0.9%. 3000 milliLiter(s) (250 mL/Hr) IV Continuous <Continuous>    MEDICATIONS  (PRN):  acetaminophen     Tablet .. 650 milliGRAM(s) Oral every 6 hours PRN Temp greater or equal to 38C (100.4F), Mild Pain (1 - 3)  aluminum hydroxide/magnesium hydroxide/simethicone Suspension 30 milliLiter(s) Oral every 6 hours PRN Dyspepsia  melatonin 3 milliGRAM(s) Oral at bedtime PRN Insomnia    ASSESSMENT / RECOMMENDATIONS  Mr. Edmond is a 52-years-old male with a past medical history of type 2 diabetes mellitus, hypertension, hyperlipidemia and  cerebrovascular accidents who presented to the emergency department complaining of nausea and multiple episodes of non-bloody non-bilious vomiting for the past ~4 days. Per family, he appeared to be more lethargic and "not himself" for the past few weeks. Labs were remarkable for hypercalcemia (17.1 mg/dL) and acute kidney injury.     Upon review of outpatient labs, patient has had hypercalcemia for more than 2 years, last level was 11 mg/dL (9/3/22 at [facility]). Per patient, he has been told that his calcium was high in the past, and that he might need surgery; however, further management of hypercalcemia was postponed due to stroke. PTH was 659 and Vitamin D-OH was 40.8. Hypercalcemia is likely to be secondary to primary hyperparathyroidism.    A1C: 6.2%  Weight: 91 kg  BMI: 32.5  Creatinine: 3.24  GFR: 22    # Hypercalcemia secondary to primary hyperparathyroidism  - Status post calcitonin 4 units/kg once (10/9/22). Overnight, he was ordered for 2 additional doses every 12 hours.   - Continue with IV fluids. Monitor for signs of fluid overload.   - Please monitor calcium every 12 hours for now.    Thank you for allowing us to participate in the care of Mr. Edmond.    Will continue to monitor.       Case discussed with Dr. Fox. Primary team updated.       Sen Henson    Endocrinology Fellow    Service Pager: [phone]

## 2022-10-10 NOTE — PROGRESS NOTE ADULT - PROBLEM SELECTOR PLAN 1
Patient presenting w/ calcium of 13.1. Last Ca 9/3 11 at [facility].   - Patient on Lisinopril-Hydrochlorothiazide 20-12/5mg daily at home  - Differential: Sarcoid, Multiple Myeloma, malignancy, and HCTZ use.   - s/p 370u calcitonin, s/p 250cc/hr for 12h (3L), s/p lasix 40mg IVP x1    - , Vit D, 25 40.8, Vit D125: 27.5 --> likely 2/2 hyperparathyroidism     - endocrine following will f/u recs  - per endo, check Calcium q12h  - Continue NS 250cc/hr  - f/u serum PTH-related peptide, and Ionized Calcium  - f/u paraneoplastic autoantibody evaluation  - strict I and Os Patient presenting w/ calcium of 17.1. Last Ca 9/3 11 at [facility].   - s/p 370u calcitonin, s/p 250cc/hr for 12h (3L), s/p lasix 40mg IVP x1  - , Vit D, 25 40.8, Vit D125: 27.5 --> likely 2/2 hyperparathyroidism     - endocrine following will f/u recs  - per endo, check Calcium q12h  - Continue NS 250cc/hr  - f/u serum PTH-related peptide, and Ionized Calcium  - f/u paraneoplastic autoantibody evaluation  - strict I and Os

## 2022-10-10 NOTE — PROGRESS NOTE ADULT - PROBLEM SELECTOR PLAN 5
Patient takes Amlodipine 5mg qd and Lisinopril-hydrochlorothiazide 20-12.5 mg qd.    - c/w amlodipine  - holding Lisinopril-HCTZ as can worsen hypercalcemia Patient takes Amlodipine 5mg qd and Lisinopril-hydrochlorothiazide 20-12.5 mg qd.    - holding Lisinopril-HCTZ as can worsen hypercalcemia  - continue amlodipine 5mg qd

## 2022-10-10 NOTE — PROGRESS NOTE ADULT - PROBLEM SELECTOR PLAN 2
- BUN/Cr ratio: 25/3.34. Baseline cr 1.55 according to [facility] records  - FeUrea 100%, suggests intra-renal  - FeNa 5.2%, suggests post-renal, obstructive  - collateral on baseline creatinine w/ PCP, SPEP and UPEP workup in am, strict I/Os  - Renal consulted: Obtain UA, repeat Urine Na+/Cr, UPCR Urine Urea. Hold RASSI & HCTZ, BID BMP, switch xarelto to eliquis - BUN/Cr ratio: 25/3.34. Baseline cr 1.55 according to [facility] records  - FeUrea 100%, suggests intra-renal.  FeNa 5.2%, suggests post-renal, obstructive  - per Renal: Holding BP meds, & switched home xarelto to eliquis 2.5mg BID     Plan:  - f/u SPEP and UPEP, continue strict I/Os  - BMP q12h   - continue eliquis 2.5mg BID (discontinued xarelto)  - f/u further renal recs

## 2022-10-10 NOTE — CONSULT NOTE ADULT - ASSESSMENT
51 yo M w/ HTN, T2DM presented with nausea and vomiting. Admitted and managed for hypercalcemia. Nephrology consulted for ARPITA w/ sCr 3.34.    #Non-oliguric ARPITA  Pt with no known underlying CKD. Unknown BCr  Cr 3.34 on admission, 3.46 today  UA with 100 protein and hematuria. Normal CK.  likely etiology volume depletion 2/2 to hypercalcemia, however FEUrea consistent with intrinsic etiology , unlikely iATN given no documented hypotensive episodes   No Hydronephrosis appreciated on abdominal sono      Plan:   Maintain net even to net negative fluid balance  Repeat UPCR  Hold RASSI, HCTZ   Continue with IVF @250 cc/hr   BID BMP   Monitor Urine out put   Strict Ins and outs   Switch Xeralto to Eliquis       #Hypercalcemia   Likely due to Primary hyperparathyroidisms    Plan:  Continue with IVF as above  BID BMP   Can also give lasix 40mg x1 once patient is volume repleted   Recommend Denosumab 60mg subq x1 given renal impairment     Endocrinology on board; pt will need parathyroidectomy    Watson Fletcher M.D  PGY-4 Nephrology  [phone]     51 yo M w/ HTN, T2DM presented with nausea and vomiting. Admitted and managed for hypercalcemia. Nephrology consulted for ARPITA w/ sCr 3.34.    #Non-oliguric ARPITA  Pt with no known underlying CKD. Unknown BCr  Cr 3.34 on admission, 3.46 today  UA with 100 protein and hematuria. Normal CK.  likely etiology volume depletion 2/2 to hypercalcemia, however FEUrea consistent with intrinsic etiology , unlikely iATN given no documented hypotensive episodes   No Hydronephrosis appreciated on abdominal sono      Plan:   Maintain net even to net negative fluid balance  Repeat UPCR  Hold RASSI, HCTZ   Continue with IVF @250 cc/hr   BID BMP   Monitor Urine out put   Strict Ins and outs   Switch Xeralto to Eliquis       #Severe Hypercalcemia   Likely due to Primary hyperparathyroidisms    Plan:  Continue with IVF as above  Maintain the urine output at 100 to 150 mL/hour  BID BMP   Can also give lasix 40mg x1 once patient is volume repleted   Recommend Denosumab 60mg subq x1 given renal impairment     Endocrinology on board; pt will need parathyroidectomy    Watson Fletcher M.D  PGY-4 Nephrology  [phone]     53 yo M w/ HTN, T2DM presented with nausea and vomiting. Admitted and managed for hypercalcemia. Nephrology consulted for ARPITA w/ sCr 3.34.    #Non-oliguric ARPITA  Pt with no known underlying CKD. Unknown BCr  Cr 3.34 on admission, 3.46 today  UA with 100 protein and hematuria. Normal CK.  likely etiology volume depletion 2/2 to hypercalcemia, however FEUrea consistent with intrinsic etiology , unlikely iATN given no documented hypotensive episodes   No Hydronephrosis appreciated on abdominal sono      Plan:   Maintain net even to slightly positive fluid balance  Repeat UPCR  Hold RASSI, HCTZ   Continue with IVF @250 cc/hr   BID BMP   Monitor Urine out put   Strict Ins and outs   Switch Xeralto to Eliquis       #Severe Hypercalcemia   Likely due to Primary hyperparathyroidisms    Plan:  Continue with IVF as above  Maintain the urine output at 100 to 150 mL/hour  BID BMP   Can also give lasix 40mg x1 once patient is volume repleted   Recommend Denosumab 60mg subq x1 given renal impairment     Endocrinology on board; pt will need parathyroidectomy    Watson Fletcher M.D  PGY-4 Nephrology  [phone]

## 2022-10-10 NOTE — PROGRESS NOTE ADULT - ASSESSMENT
53 yo M w/ PMHx HTN, T2DM, HLD, Hypothyroidism, CVA (most recently admitted ~4w ago, admitted at [facility], R weakness, slurred speech) presents with 3-4d of multiple NBNB nausea and vomiting, found to have a calcium of 17.1, admitted to telemetry for further workup. 53 yo M w/ PMHx HTN, T2DM, HLD, Hypothyroidism, CVA presented with 3-4d of multiple NBNB nausea and vomiting, found to have a calcium of 17.1, likely 2/2 to primary hyperparathyroidism, admitted to telemetry for further management of hypercalcemia & ARPITA.

## 2022-10-11 DIAGNOSIS — J98.59 OTHER DISEASES OF MEDIASTINUM, NOT ELSEWHERE CLASSIFIED: ICD-10-CM

## 2022-10-11 LAB
ALBUMIN SERPL ELPH-MCNC: 3.4 G/DL — SIGNIFICANT CHANGE UP (ref 3.3–5)
ALP SERPL-CCNC: 100 U/L — SIGNIFICANT CHANGE UP (ref 40–120)
ALT FLD-CCNC: 12 U/L — SIGNIFICANT CHANGE UP (ref 10–45)
ANION GAP SERPL CALC-SCNC: 14 MMOL/L — SIGNIFICANT CHANGE UP (ref 5–17)
ANION GAP SERPL CALC-SCNC: 16 MMOL/L — SIGNIFICANT CHANGE UP (ref 5–17)
AST SERPL-CCNC: 20 U/L — SIGNIFICANT CHANGE UP (ref 10–40)
BASOPHILS # BLD AUTO: 0.05 K/UL — SIGNIFICANT CHANGE UP (ref 0–0.2)
BASOPHILS NFR BLD AUTO: 0.3 % — SIGNIFICANT CHANGE UP (ref 0–2)
BILIRUB SERPL-MCNC: 0.6 MG/DL — SIGNIFICANT CHANGE UP (ref 0.2–1.2)
BUN SERPL-MCNC: 25 MG/DL — HIGH (ref 7–23)
BUN SERPL-MCNC: 30 MG/DL — HIGH (ref 7–23)
CALCIUM SERPL-MCNC: 17 MG/DL — CRITICAL HIGH (ref 8.4–10.5)
CALCIUM SERPL-MCNC: 17.8 MG/DL — CRITICAL HIGH (ref 8.4–10.5)
CHLORIDE SERPL-SCNC: 108 MMOL/L — SIGNIFICANT CHANGE UP (ref 96–108)
CHLORIDE SERPL-SCNC: 108 MMOL/L — SIGNIFICANT CHANGE UP (ref 96–108)
CO2 SERPL-SCNC: 16 MMOL/L — LOW (ref 22–31)
CO2 SERPL-SCNC: 20 MMOL/L — LOW (ref 22–31)
CREAT SERPL-MCNC: 3.89 MG/DL — HIGH (ref 0.5–1.3)
CREAT SERPL-MCNC: 3.97 MG/DL — HIGH (ref 0.5–1.3)
EGFR: 18 ML/MIN/1.73M2 — LOW
EGFR: 18 ML/MIN/1.73M2 — LOW
EOSINOPHIL # BLD AUTO: 0.05 K/UL — SIGNIFICANT CHANGE UP (ref 0–0.5)
EOSINOPHIL NFR BLD AUTO: 0.3 % — SIGNIFICANT CHANGE UP (ref 0–6)
GLUCOSE SERPL-MCNC: 120 MG/DL — HIGH (ref 70–99)
GLUCOSE SERPL-MCNC: 126 MG/DL — HIGH (ref 70–99)
HCT VFR BLD CALC: 47.4 % — SIGNIFICANT CHANGE UP (ref 39–50)
HGB BLD-MCNC: 16.8 G/DL — SIGNIFICANT CHANGE UP (ref 13–17)
IMM GRANULOCYTES NFR BLD AUTO: 0.3 % — SIGNIFICANT CHANGE UP (ref 0–0.9)
LYMPHOCYTES # BLD AUTO: 1.38 K/UL — SIGNIFICANT CHANGE UP (ref 1–3.3)
LYMPHOCYTES # BLD AUTO: 9 % — LOW (ref 13–44)
MAGNESIUM SERPL-MCNC: 2.3 MG/DL — SIGNIFICANT CHANGE UP (ref 1.6–2.6)
MCHC RBC-ENTMCNC: 30 PG — SIGNIFICANT CHANGE UP (ref 27–34)
MCHC RBC-ENTMCNC: 35.4 GM/DL — SIGNIFICANT CHANGE UP (ref 32–36)
MCV RBC AUTO: 84.6 FL — SIGNIFICANT CHANGE UP (ref 80–100)
MONOCYTES # BLD AUTO: 1.18 K/UL — HIGH (ref 0–0.9)
MONOCYTES NFR BLD AUTO: 7.7 % — SIGNIFICANT CHANGE UP (ref 2–14)
NEUTROPHILS # BLD AUTO: 12.66 K/UL — HIGH (ref 1.8–7.4)
NEUTROPHILS NFR BLD AUTO: 82.4 % — HIGH (ref 43–77)
NRBC # BLD: 0 /100 WBCS — SIGNIFICANT CHANGE UP (ref 0–0)
PHOSPHATE SERPL-MCNC: 4.6 MG/DL — HIGH (ref 2.5–4.5)
PLATELET # BLD AUTO: 260 K/UL — SIGNIFICANT CHANGE UP (ref 150–400)
POTASSIUM SERPL-MCNC: 3.7 MMOL/L — SIGNIFICANT CHANGE UP (ref 3.5–5.3)
POTASSIUM SERPL-MCNC: 3.8 MMOL/L — SIGNIFICANT CHANGE UP (ref 3.5–5.3)
POTASSIUM SERPL-SCNC: 3.7 MMOL/L — SIGNIFICANT CHANGE UP (ref 3.5–5.3)
POTASSIUM SERPL-SCNC: 3.8 MMOL/L — SIGNIFICANT CHANGE UP (ref 3.5–5.3)
PROT SERPL-MCNC: 6.7 G/DL — SIGNIFICANT CHANGE UP (ref 6–8.3)
RBC # BLD: 5.6 M/UL — SIGNIFICANT CHANGE UP (ref 4.2–5.8)
RBC # FLD: 13.2 % — SIGNIFICANT CHANGE UP (ref 10.3–14.5)
SODIUM SERPL-SCNC: 140 MMOL/L — SIGNIFICANT CHANGE UP (ref 135–145)
SODIUM SERPL-SCNC: 142 MMOL/L — SIGNIFICANT CHANGE UP (ref 135–145)
WBC # BLD: 15.37 K/UL — HIGH (ref 3.8–10.5)
WBC # FLD AUTO: 15.37 K/UL — HIGH (ref 3.8–10.5)

## 2022-10-11 PROCEDURE — 99233 SBSQ HOSP IP/OBS HIGH 50: CPT | Mod: GC

## 2022-10-11 PROCEDURE — 93306 TTE W/DOPPLER COMPLETE: CPT | Mod: 26

## 2022-10-11 PROCEDURE — 71250 CT THORAX DX C-: CPT | Mod: 26

## 2022-10-11 RX ORDER — HYDRALAZINE HCL 50 MG
10 TABLET ORAL ONCE
Refills: 0 | Status: COMPLETED | OUTPATIENT
Start: 2022-10-11 | End: 2022-10-11

## 2022-10-11 RX ORDER — SODIUM CHLORIDE 9 MG/ML
3000 INJECTION INTRAMUSCULAR; INTRAVENOUS; SUBCUTANEOUS
Refills: 0 | Status: DISCONTINUED | OUTPATIENT
Start: 2022-10-11 | End: 2022-10-12

## 2022-10-11 RX ORDER — PAMIDRONATE DISODIUM 9 MG/ML
60 INJECTION, SOLUTION INTRAVENOUS ONCE
Refills: 0 | Status: COMPLETED | OUTPATIENT
Start: 2022-10-11 | End: 2022-10-11

## 2022-10-11 RX ORDER — SODIUM CHLORIDE 9 MG/ML
3000 INJECTION INTRAMUSCULAR; INTRAVENOUS; SUBCUTANEOUS
Refills: 0 | Status: DISCONTINUED | OUTPATIENT
Start: 2022-10-11 | End: 2022-10-11

## 2022-10-11 RX ORDER — HYDRALAZINE HCL 50 MG
5 TABLET ORAL ONCE
Refills: 0 | Status: COMPLETED | OUTPATIENT
Start: 2022-10-11 | End: 2022-10-11

## 2022-10-11 RX ORDER — ONDANSETRON 8 MG/1
4 TABLET, FILM COATED ORAL EVERY 6 HOURS
Refills: 0 | Status: DISCONTINUED | OUTPATIENT
Start: 2022-10-11 | End: 2022-10-15

## 2022-10-11 RX ORDER — SODIUM CHLORIDE 9 MG/ML
5000 INJECTION INTRAMUSCULAR; INTRAVENOUS; SUBCUTANEOUS
Refills: 0 | Status: DISCONTINUED | OUTPATIENT
Start: 2022-10-11 | End: 2022-10-12

## 2022-10-11 RX ORDER — ONDANSETRON 8 MG/1
4 TABLET, FILM COATED ORAL ONCE
Refills: 0 | Status: COMPLETED | OUTPATIENT
Start: 2022-10-11 | End: 2022-10-11

## 2022-10-11 RX ORDER — PROPRANOLOL HCL 160 MG
1 CAPSULE, EXTENDED RELEASE 24HR ORAL
Qty: 120 | Refills: 0
Start: 2022-10-11 | End: 2022-11-09

## 2022-10-11 RX ADMIN — PAMIDRONATE DISODIUM 64.17 MILLIGRAM(S): 9 INJECTION, SOLUTION INTRAVENOUS at 11:45

## 2022-10-11 RX ADMIN — Medication 10 MILLIGRAM(S): at 21:32

## 2022-10-11 RX ADMIN — AMLODIPINE BESYLATE 5 MILLIGRAM(S): 2.5 TABLET ORAL at 06:11

## 2022-10-11 RX ADMIN — APIXABAN 2.5 MILLIGRAM(S): 2.5 TABLET, FILM COATED ORAL at 11:44

## 2022-10-11 RX ADMIN — Medication 5 MILLIGRAM(S): at 05:04

## 2022-10-11 RX ADMIN — ONDANSETRON 4 MILLIGRAM(S): 8 TABLET, FILM COATED ORAL at 16:09

## 2022-10-11 RX ADMIN — Medication 81 MILLIGRAM(S): at 11:44

## 2022-10-11 RX ADMIN — Medication 3 MILLIGRAM(S): at 23:43

## 2022-10-11 RX ADMIN — APIXABAN 2.5 MILLIGRAM(S): 2.5 TABLET, FILM COATED ORAL at 22:16

## 2022-10-11 RX ADMIN — SODIUM CHLORIDE 200 MILLILITER(S): 9 INJECTION INTRAMUSCULAR; INTRAVENOUS; SUBCUTANEOUS at 22:17

## 2022-10-11 RX ADMIN — SODIUM CHLORIDE 1000 MILLILITER(S): 9 INJECTION INTRAMUSCULAR; INTRAVENOUS; SUBCUTANEOUS at 18:48

## 2022-10-11 RX ADMIN — ATORVASTATIN CALCIUM 80 MILLIGRAM(S): 80 TABLET, FILM COATED ORAL at 21:32

## 2022-10-11 RX ADMIN — Medication 25 MICROGRAM(S): at 06:10

## 2022-10-11 NOTE — PROGRESS NOTE ADULT - ASSESSMENT
51 yo M w/ PMHx HTN, T2DM, HLD, Hypothyroidism, CVA presented with 3-4d of multiple NBNB nausea and vomiting, found to have a calcium of 17.1, likely 2/2 to primary hyperparathyroidism, admitted to telemetry for further management of hypercalcemia & ARPITA.

## 2022-10-11 NOTE — PROGRESS NOTE ADULT - PROBLEM SELECTOR PLAN 4
Per HIE: History of prior strokes in 2018, 2019. Home meds Xarelto, ASA 81mg, Atorvastatin 80mg, & Gabapentin for tingling/numbness  - switched xarelto to eliquis 2.5mg BID, per Renal recs     - c/w eliquis 2.5mg BID, aspirin, and high intensity statin therapy  - hold Gabapentin iso ARPITA Leukocytosis to 12.27 on admission. Patient presenting w/ 3-4 days of nausea and vomiting.     - WBC still elevated with neutrophilic predominance, no signs of acute infection  - given hypercalcemia, malignancy-associated hyperCa2+ & leukocytosis is possible  - continue to trend, f/u imaging

## 2022-10-11 NOTE — PROGRESS NOTE ADULT - PROBLEM SELECTOR PLAN 8
No known home meds.   - A1c 6.3  - continue to monitor blood glucose, start ISS if needed Patient takes Rosuvastatin 40mg qd.     - c/w rosuvastatin

## 2022-10-11 NOTE — PROGRESS NOTE ADULT - PROBLEM SELECTOR PLAN 7
Patient takes Rosuvastatin 40mg qd.     - c/w rosuvastatin Home med Levothyroxine 25mcg qd.    - c/w home med

## 2022-10-11 NOTE — PROGRESS NOTE ADULT - PROBLEM SELECTOR PLAN 9
F: NS @ 250cc/hr  E: Replete as necessary  DVT PPx: Eliquis 2.5mg BID  Dispo: 7Lach No known home meds.   - A1c 6.3  - continue to monitor blood glucose, start ISS if needed

## 2022-10-11 NOTE — PROGRESS NOTE ADULT - SUBJECTIVE AND OBJECTIVE BOX
SUBJECTIVE / OVERNIGHT EVENTS  Patient was seen and examined this morning.     REVIEW OF SYSTEMS  Constitutional:  Negative fever, chills or loss of appetite.  Eyes:  Negative blurry vision or double vision.  Cardiovascular:  Negative for chest pain or palpitations.  Respiratory:  Negative for cough, wheezing, or SOB.   Gastrointestinal:  Negative for nausea, vomiting, diarrhea, constipation, or abdominal pain.  Genitourinary:  Negative frequency, urgency or dysuria.  Neurologic:  No headache, confusion, dizziness, lightheadedness.    PHYSICAL EXAM  Vital Signs Last 24 Hrs  T(C): 36.9 (11 Oct 2022 05:31), Max: 37.1 (10 Oct 2022 22:09)  T(F): 98.5 (11 Oct 2022 05:31), Max: 98.7 (10 Oct 2022 22:09)  HR: 100 (11 Oct 2022 08:39) (91 - 104)  BP: 170/102 (11 Oct 2022 08:39) (134/100 - 194/96)  BP(mean): 127 (11 Oct 2022 08:39) (107 - 138)  RR: 18 (11 Oct 2022 08:39) (18 - 20)  SpO2: 97% (11 Oct 2022 08:39) (94% - 97%)    Parameters below as of 11 Oct 2022 08:39  Patient On (Oxygen Delivery Method): room air    Constitutional: Awake, alert, in no acute distress.   HEENT: Normocephalic, atraumatic, MADY, no proptosis or lid retraction.   Neck: supple, no acanthosis, no thyromegaly or palpable thyroid nodules.  Respiratory: Lungs clear to ausculation bilaterally.   Cardiovascular: regular rhythm, normal S1 and S2, no audible murmurs.   GI: soft, non-tender, non-distended, bowel sounds present, no masses appreciated.  Extremities: No lower extremity edema, peripheral pulses present.   Skin: no rashes.   Psychiatric: AAO x 3. Normal affect/mood.     LABS                        16.8   15.37 )-----------( 260      ( 11 Oct 2022 06:30 )             47.4     140  |  108  |  25<H>  ----------------------------<  120<H>  3.8   |  16<L>  |  3.89<H>    Ca    17.0<HH>      11 Oct 2022 06:30  Phos  4.6     10-11  Mg     2.3     10-11  TPro  6.7  /  Alb  3.4  /  TBili  0.6  /  DBili  x   /  AST  20  /  ALT  12  /  AlkPhos  100  10-11    Urinalysis Basic - ( 10 Oct 2022 05:16 )  Color: Yellow / Appearance: Clear / SG: >=1.030 / pH: x  Gluc: x / Ketone: 15 mg/dL  / Bili: Negative / Urobili: 0.2 E.U./dL   Blood: x / Protein: 100 mg/dL / Nitrite: NEGATIVE   Leuk Esterase: NEGATIVE / RBC: Many /HPF / WBC < 5 /HPF   Sq Epi: x / Non Sq Epi: 0-5 /HPF / Bacteria: Present /HPF    CALCIUM TREND  17.0 mg/dL (10/11/22 - 06:30)  16.1 mg/dL (10/10/22 - 18:57)  14.8 mg/dL (10/10/22 - 05:30)   15.1 mg/dL (10/9/22 - 18:11)   16.5 mg/dL (10/9/22 - 05:30)   17.1 mg/dL (10/9/22 - 00:07)     MEDICATIONS  MEDICATIONS  (STANDING):  amLODIPine   Tablet 5 milliGRAM(s) Oral daily  apixaban 2.5 milliGRAM(s) Oral every 12 hours  aspirin enteric coated 81 milliGRAM(s) Oral daily  atorvastatin 80 milliGRAM(s) Oral at bedtime  levothyroxine 25 MICROGram(s) Oral daily  pamidronate IVPB 60 milliGRAM(s) IV Intermittent once  sodium chloride 0.9%. 3000 milliLiter(s) (250 mL/Hr) IV Continuous <Continuous>  sodium chloride 0.9%. 3000 milliLiter(s) (250 mL/Hr) IV Continuous <Continuous>  sodium chloride 0.9%. 3000 milliLiter(s) (250 mL/Hr) IV Continuous <Continuous>  sodium chloride 0.9%. 3000 milliLiter(s) (250 mL/Hr) IV Continuous <Continuous>    MEDICATIONS  (PRN):  acetaminophen     Tablet .. 650 milliGRAM(s) Oral every 6 hours PRN Temp greater or equal to 38C (100.4F), Mild Pain (1 - 3)  aluminum hydroxide/magnesium hydroxide/simethicone Suspension 30 milliLiter(s) Oral every 6 hours PRN Dyspepsia  melatonin 3 milliGRAM(s) Oral at bedtime PRN Insomnia    ASSESSMENT / RECOMMENDATIONS      A1C: ***   Weight: ***   BMI: ***  Creatinine: ***  GFR: ***  Ejection Fraction: ***        Thank you for allowing us to participate in the care of Mr. Edmond.    Will continue to monitor.       Case discussed with Dr. Fox. Primary team updated.       Sen Henson    Endocrinology Fellow    Service Pager: [phone]    SUBJECTIVE / OVERNIGHT EVENTS  Patient was seen and examined this morning. He didn't have any new concerns or complaints. Patient received a total of 4 doses of calcitonin, last dose administered overnight; however, his calcium levels increased back to 17.0 mg/dL this morning. ENT was consulted who recommended to obtain a Sestamibi scan and thyroid ultrasound.     REVIEW OF SYSTEMS  Constitutional:  Negative fever or chills.  Eyes:  Negative blurry vision or double vision.  Cardiovascular:  Negative for chest pain or palpitations.  Respiratory:  Negative for cough, wheezing, or SOB.   Gastrointestinal:  (+) Nausea, diarrhea. Negative constipation, or abdominal pain.  Neurologic:  No headache, dizziness, lightheadedness.    PHYSICAL EXAM  Vital Signs Last 24 Hrs  T(C): 36.9 (11 Oct 2022 05:31), Max: 37.1 (10 Oct 2022 22:09)  T(F): 98.5 (11 Oct 2022 05:31), Max: 98.7 (10 Oct 2022 22:09)  HR: 100 (11 Oct 2022 08:39) (91 - 104)  BP: 170/102 (11 Oct 2022 08:39) (134/100 - 194/96)  BP(mean): 127 (11 Oct 2022 08:39) (107 - 138)  RR: 18 (11 Oct 2022 08:39) (18 - 20)  SpO2: 97% (11 Oct 2022 08:39) (94% - 97%)    Parameters below as of 11 Oct 2022 08:39  Patient On (Oxygen Delivery Method): room air    Constitutional: Awake, alert, obese male, confused, somnolent, with slurred speech, in no acute distress.   HEENT: Normocephalic, atraumatic, MADY.  Respiratory: Lungs clear to ausculation bilaterally.   Cardiovascular: regular rhythm, normal S1 and S2, no audible murmurs.   GI: soft, non-tender, non-distended, bowel sounds present.  Extremities: No lower extremity edema.  Psychiatric: AAO x 3.     LABS                        16.8   15.37 )-----------( 260      ( 11 Oct 2022 06:30 )             47.4     140  |  108  |  25<H>  ----------------------------<  120<H>  3.8   |  16<L>  |  3.89<H>    Ca    17.0<HH>      11 Oct 2022 06:30  Phos  4.6     10-11  Mg     2.3     10-11  TPro  6.7  /  Alb  3.4  /  TBili  0.6  /  DBili  x   /  AST  20  /  ALT  12  /  AlkPhos  100  10-11    Urinalysis Basic - ( 10 Oct 2022 05:16 )  Color: Yellow / Appearance: Clear / SG: >=1.030 / pH: x  Gluc: x / Ketone: 15 mg/dL  / Bili: Negative / Urobili: 0.2 E.U./dL   Blood: x / Protein: 100 mg/dL / Nitrite: NEGATIVE   Leuk Esterase: NEGATIVE / RBC: Many /HPF / WBC < 5 /HPF   Sq Epi: x / Non Sq Epi: 0-5 /HPF / Bacteria: Present /HPF    CALCIUM TREND  17.0 mg/dL (10/11/22 - 06:30)  16.1 mg/dL (10/10/22 - 18:57)  14.8 mg/dL (10/10/22 - 05:30)   15.1 mg/dL (10/9/22 - 18:11)   16.5 mg/dL (10/9/22 - 05:30)   17.1 mg/dL (10/9/22 - 00:07)     MEDICATIONS  MEDICATIONS  (STANDING):  amLODIPine   Tablet 5 milliGRAM(s) Oral daily  apixaban 2.5 milliGRAM(s) Oral every 12 hours  aspirin enteric coated 81 milliGRAM(s) Oral daily  atorvastatin 80 milliGRAM(s) Oral at bedtime  levothyroxine 25 MICROGram(s) Oral daily  pamidronate IVPB 60 milliGRAM(s) IV Intermittent once  sodium chloride 0.9%. 3000 milliLiter(s) (250 mL/Hr) IV Continuous <Continuous>  sodium chloride 0.9%. 3000 milliLiter(s) (250 mL/Hr) IV Continuous <Continuous>  sodium chloride 0.9%. 3000 milliLiter(s) (250 mL/Hr) IV Continuous <Continuous>  sodium chloride 0.9%. 3000 milliLiter(s) (250 mL/Hr) IV Continuous <Continuous>    MEDICATIONS  (PRN):  acetaminophen     Tablet .. 650 milliGRAM(s) Oral every 6 hours PRN Temp greater or equal to 38C (100.4F), Mild Pain (1 - 3)  aluminum hydroxide/magnesium hydroxide/simethicone Suspension 30 milliLiter(s) Oral every 6 hours PRN Dyspepsia  melatonin 3 milliGRAM(s) Oral at bedtime PRN Insomnia    ASSESSMENT / RECOMMENDATIONS  Mr. Edmond is a 52-years-old male with a past medical history of type 2 diabetes mellitus, hypertension, hyperlipidemia and  cerebrovascular accidents who presented to the emergency department complaining of nausea and multiple episodes of non-bloody non-bilious vomiting for the past ~4 days. Per family, he appeared to be more lethargic and "not himself" for the past few weeks. Labs were remarkable for hypercalcemia (17.1 mg/dL) and acute kidney injury.     Upon review of outpatient labs, patient has had hypercalcemia for more than 2 years, last level was 11 mg/dL (9/3/22 at [facility]). Per patient, he has been told that his calcium was high in the past, and that he might need surgery; however, further management of hypercalcemia was postponed due to stroke. PTH was 659 and Vitamin D-OH was 40.8. Hypercalcemia is likely to be secondary to primary hyperparathyroidism.    A1C: 6.2%  Weight: 91 kg  BMI: 32.5  Creatinine: 3.89  GFR: 18    # Hypercalcemia secondary to primary hyperparathyroidism  - Status post 4 doses of calcitonin. Overnight, patient's calcium increased back to 17.0 mg/dL.   - Continue with IV fluids. Monitor for signs of fluid overload.  - Please monitor calcium every 12 hours for now.    Thank you for allowing us to participate in the care of Mr. Edmond.    Will continue to monitor.       Case discussed with Dr. Fox. Primary team updated.       Sen Henson    Endocrinology Fellow    Service Pager: [phone]  SUBJECTIVE / OVERNIGHT EVENTS  Patient was seen and examined this morning. He didn't have any new concerns or complaints. Patient received a total of 4 doses of calcitonin, last dose administered overnight; however, his calcium levels increased back to 17.0 mg/dL this morning. ENT was consulted who recommended to obtain a Sestamibi scan and thyroid ultrasound.     REVIEW OF SYSTEMS  Constitutional:  Negative fever or chills.  Eyes:  Negative blurry vision or double vision.  Cardiovascular:  Negative for chest pain or palpitations.  Respiratory:  Negative for cough, wheezing, or SOB.   Gastrointestinal:  (+) Nausea, diarrhea. Negative constipation, or abdominal pain.  Neurologic:  No headache, dizziness, lightheadedness.    PHYSICAL EXAM  Vital Signs Last 24 Hrs  T(C): 36.9 (11 Oct 2022 05:31), Max: 37.1 (10 Oct 2022 22:09)  T(F): 98.5 (11 Oct 2022 05:31), Max: 98.7 (10 Oct 2022 22:09)  HR: 100 (11 Oct 2022 08:39) (91 - 104)  BP: 170/102 (11 Oct 2022 08:39) (134/100 - 194/96)  BP(mean): 127 (11 Oct 2022 08:39) (107 - 138)  RR: 18 (11 Oct 2022 08:39) (18 - 20)  SpO2: 97% (11 Oct 2022 08:39) (94% - 97%)    Parameters below as of 11 Oct 2022 08:39  Patient On (Oxygen Delivery Method): room air    Constitutional: Awake, alert, obese male, confused, somnolent, with slurred speech, in no acute distress.   HEENT: Normocephalic, atraumatic, MADY.  Respiratory: Lungs clear to ausculation bilaterally.   Cardiovascular: regular rhythm, normal S1 and S2, no audible murmurs.   GI: soft, non-tender, non-distended, bowel sounds present.  Extremities: No lower extremity edema.  Psychiatric: AAO x 3.     LABS                        16.8   15.37 )-----------( 260      ( 11 Oct 2022 06:30 )             47.4     140  |  108  |  25<H>  ----------------------------<  120<H>  3.8   |  16<L>  |  3.89<H>    Ca    17.0<HH>      11 Oct 2022 06:30  Phos  4.6     10-11  Mg     2.3     10-11  TPro  6.7  /  Alb  3.4  /  TBili  0.6  /  DBili  x   /  AST  20  /  ALT  12  /  AlkPhos  100  10-11    Urinalysis Basic - ( 10 Oct 2022 05:16 )  Color: Yellow / Appearance: Clear / SG: >=1.030 / pH: x  Gluc: x / Ketone: 15 mg/dL  / Bili: Negative / Urobili: 0.2 E.U./dL   Blood: x / Protein: 100 mg/dL / Nitrite: NEGATIVE   Leuk Esterase: NEGATIVE / RBC: Many /HPF / WBC < 5 /HPF   Sq Epi: x / Non Sq Epi: 0-5 /HPF / Bacteria: Present /HPF    CALCIUM TREND  17.0 mg/dL (10/11/22 - 06:30)  16.1 mg/dL (10/10/22 - 18:57)  14.8 mg/dL (10/10/22 - 05:30)   15.1 mg/dL (10/9/22 - 18:11)   16.5 mg/dL (10/9/22 - 05:30)   17.1 mg/dL (10/9/22 - 00:07)     MEDICATIONS  MEDICATIONS  (STANDING):  amLODIPine   Tablet 5 milliGRAM(s) Oral daily  apixaban 2.5 milliGRAM(s) Oral every 12 hours  aspirin enteric coated 81 milliGRAM(s) Oral daily  atorvastatin 80 milliGRAM(s) Oral at bedtime  levothyroxine 25 MICROGram(s) Oral daily  pamidronate IVPB 60 milliGRAM(s) IV Intermittent once  sodium chloride 0.9%. 3000 milliLiter(s) (250 mL/Hr) IV Continuous <Continuous>  sodium chloride 0.9%. 3000 milliLiter(s) (250 mL/Hr) IV Continuous <Continuous>  sodium chloride 0.9%. 3000 milliLiter(s) (250 mL/Hr) IV Continuous <Continuous>  sodium chloride 0.9%. 3000 milliLiter(s) (250 mL/Hr) IV Continuous <Continuous>    MEDICATIONS  (PRN):  acetaminophen     Tablet .. 650 milliGRAM(s) Oral every 6 hours PRN Temp greater or equal to 38C (100.4F), Mild Pain (1 - 3)  aluminum hydroxide/magnesium hydroxide/simethicone Suspension 30 milliLiter(s) Oral every 6 hours PRN Dyspepsia  melatonin 3 milliGRAM(s) Oral at bedtime PRN Insomnia    ASSESSMENT / RECOMMENDATIONS  Mr. Edmond is a 52-years-old male with a past medical history of type 2 diabetes mellitus, hypertension, hyperlipidemia and  cerebrovascular accidents who presented to the emergency department complaining of nausea and multiple episodes of non-bloody non-bilious vomiting for the past ~4 days. Per family, he appeared to be more lethargic and "not himself" for the past few weeks. Labs were remarkable for hypercalcemia (17.1 mg/dL) and acute kidney injury.     Upon review of outpatient labs, patient has had hypercalcemia for more than 2 years, last level was 11 mg/dL (9/3/22 at [facility]). Per patient, he has been told that his calcium was high in the past, and that he might need surgery; however, further management of hypercalcemia was postponed due to stroke. PTH was 659 and Vitamin D-OH was 40.8. Hypercalcemia is likely to be secondary to primary hyperparathyroidism.    A1C: 6.2%  Weight: 91 kg  BMI: 32.5  Creatinine: 3.89  GFR: 18    # Hypercalcemia secondary to primary hyperparathyroidism  - Status post 4 doses of calcitonin. Overnight, patient's calcium increased back to 17.0 mg/dL.   - Agree with pamidronate 60 mg IVP once.   - Please administer 1 liter of NS per hour for a total of three hours, followed by 200 mL/hr.   - Monitor for signs of fluid overload.  - Please monitor calcium every 12 hours for now.    Thank you for allowing us to participate in the care of Mr. Edmond.    Will continue to monitor.       Case discussed with Dr. Fox. Primary team updated.       Sen Henson    Endocrinology Fellow    Service Pager: [phone]

## 2022-10-11 NOTE — PROVIDER CONTACT NOTE (CRITICAL VALUE NOTIFICATION) - SITUATION
pt admitted for nasuea and vomiting as well as hypercalcemia., calcium uptrending.
Pt admit w/ hypercalcemia. Uptrending
Calcium-15.1

## 2022-10-11 NOTE — PROGRESS NOTE ADULT - ASSESSMENT
51 yo M w/ HTN, T2DM presented with nausea and vomiting. Admitted and managed for hypercalcemia. Nephrology consulted for ARPITA w/ sCr 3.34.    #Non-oliguric ARPITA  Pt with no known underlying CKD. Unknown BCr  Cr 3.34 on admission, 3.46 today  UA with 100 protein and hematuria. Normal CK.  likely etiology volume depletion 2/2 to hypercalcemia, however FEUrea consistent with intrinsic etiology , unlikely iATN given no documented hypotensive episodes   No Hydronephrosis appreciated on abdominal sono      Plan:   Maintain net even to slightly positive fluid balance  Continue with IVF @250 cc/hr   Repeat UPCR  Hold RASSI, HCTZ   BID BMP   Monitor Urine out put   Strict Ins and outs   Switch Xeralto to Eliquis       #Severe Hypercalcemia   Likely due to Primary hyperparathyroidism    Plan:  Continue with IVF as above  Maintain net even to slightly positive fluid balance as Hypovolemia exacerbates hypercalcemia  Maintain the urine output at 100 to 150 mL/hour  BID BMP   Can also give lasix 40mg x1 once patient is volume repleted   Recommend Denosumab 60mg subq x1 given renal impairment     Endocrinology on board; pt will need parathyroidectomy    Watson Fletcher M.D  PGY-4 Nephrology  [phone]   53 yo M w/ HTN, T2DM presented with nausea and vomiting. Admitted and managed for hypercalcemia. Nephrology consulted for ARPITA w/ sCr 3.34.    #Non-oliguric ARPITA  Pt with no known underlying CKD. Unknown BCr  Cr 3.34 on admission, 3.46 today  UA with 100 protein and hematuria. Normal CK.  likely etiology volume depletion 2/2 to hypercalcemia, however FEUrea consistent with intrinsic etiology , unlikely iATN given no documented hypotensive episodes   No Hydronephrosis appreciated on abdominal sono      Plan:   Maintain net even to slightly positive fluid balance  Continue with IVF @250 cc/hr   Repeat UPCR  Hold RASSI, HCTZ   BID BMP   Monitor Urine out put   Strict Ins and outs   Switch Xeralto to Eliquis       #Severe Hypercalcemia   Likely due to Primary hyperparathyroidism    Plan:  Give Pamidronate 60mg over 4 hours STAT (Denosumab not available per pharmacy)  Continue with IVF as above  Maintain net even to slightly positive fluid balance as Hypovolemia exacerbates hypercalcemia  Maintain the urine output at 100 to 150 mL/hour  BID BMP   Can also give lasix 40mg x1 once patient is volume repleted   Endocrinology on board; pt will need parathyroidectomy    Watson Fletcher M.D  PGY-4 Nephrology  [phone]

## 2022-10-11 NOTE — PROGRESS NOTE ADULT - PROBLEM SELECTOR PLAN 2
- BUN/Cr ratio: 25/3.34. Baseline cr 1.55 according to [facility] records  - FeUrea 100%, suggests intra-renal.  FeNa 5.2%, suggests post-renal, obstructive  - per Renal: Holding BP meds, & switched home xarelto to eliquis 2.5mg BID     Plan:  - f/u SPEP and UPEP, continue strict I/Os  - BMP q12h   - continue eliquis 2.5mg BID (discontinued xarelto)  - f/u further renal recs

## 2022-10-11 NOTE — PROGRESS NOTE ADULT - PROBLEM SELECTOR PLAN 5
Patient takes Amlodipine 5mg qd and Lisinopril-hydrochlorothiazide 20-12.5 mg qd.    - holding Lisinopril-HCTZ as can worsen hypercalcemia  - continue amlodipine 5mg qd Per HIE: History of prior strokes in 2018, 2019. Home meds Xarelto, ASA 81mg, Atorvastatin 80mg, & Gabapentin for tingling/numbness  - switched xarelto to eliquis 2.5mg BID, per Renal recs     - c/w eliquis 2.5mg BID, aspirin, and high intensity statin therapy  - hold Gabapentin iso ARPITA

## 2022-10-11 NOTE — PROGRESS NOTE ADULT - PROBLEM SELECTOR PLAN 1
Patient presenting w/ calcium of 17.1. Last Ca 9/3 11 at [facility].   - s/p 370u calcitonin, s/p 250cc/hr for 12h (3L), s/p lasix 40mg IVP x1  - , Vit D, 25 40.8, Vit D125: 27.5 --> likely 2/2 hyperparathyroidism     - endocrine following will f/u recs  - per endo, check Calcium q12h  - Continue NS 250cc/hr  - f/u serum PTH-related peptide, and Ionized Calcium  - f/u paraneoplastic autoantibody evaluation  - strict I and Os Likely 2/2 hyperparathyroidism   Patient presenting w/ calcium of 17.1. Last Ca 9/3 11 at [facility].   - s/p 1826u calcitonin over 2 days, s/p lasix 40mg IVP x1, on 250cc NS/hr  - , Vit D, 25 40.8, Vit D125: 27.5. Ionized Calcium 8.0  - 10/11 Ca: 17 despite calcitonin --> gave Pamidronate 60mg IVP     - renal & endocrine following, will f/u recs  - Continue q12h BMP, NS 250cc/hr  - Per renal, ok to give lasix 40mg x1 once patient volume repleted  - f/u serum PTH-related peptide & paraneoplastic autoantibody evaluation  - strict I and Os

## 2022-10-11 NOTE — PROGRESS NOTE ADULT - SUBJECTIVE AND OBJECTIVE BOX
Patient is a 52y Male seen and evaluated at bedside. Overnight events noted. Ca back up to 17, with SCr 3.89. Pt with 3.6 L UO. s/p 4 doses of calcitonin.       Meds:    acetaminophen     Tablet .. 650 every 6 hours PRN  aluminum hydroxide/magnesium hydroxide/simethicone Suspension 30 every 6 hours PRN  amLODIPine   Tablet 5 daily  apixaban 2.5 every 12 hours  aspirin enteric coated 81 daily  atorvastatin 80 at bedtime  levothyroxine 25 daily  melatonin 3 at bedtime PRN  sodium chloride 0.9%. 3000 <Continuous>  sodium chloride 0.9%. 3000 <Continuous>  sodium chloride 0.9%. 3000 <Continuous>  sodium chloride 0.9%. 3000 <Continuous>      T(C): , Max: 37.1 (10-10-22 @ 22:09)  T(F): , Max: 98.7 (10-10-22 @ 22:09)  HR: 100 (10-11-22 @ 08:39)  BP: 170/102 (10-11-22 @ 08:39)  BP(mean): 127 (10-11-22 @ 08:39)  RR: 18 (10-11-22 @ 08:39)  SpO2: 97% (10-11-22 @ 08:39)  Wt(kg): --    10-10 @ 07:01  -  10-11 @ 07:00  --------------------------------------------------------  IN: 0 mL / OUT: 3600 mL / NET: -3600 mL          Review of Systems:  ROS negative except as per HPI      PHYSICAL EXAM:  General: NAD, AAOX3  Cardiovascular: S1, S2 normal; RRR, no M/G/R  Respiratory: CTABL; no W/R/R  Gastrointestinal: soft. +mild, diffuse tenderness. ND. no rebound or guarding.   Extremities: WWP; no edema, clubbing or cyanosis  Neurological: AAOx3; No focal deficits noted    LABS:                        16.8   15.37 )-----------( 260      ( 11 Oct 2022 06:30 )             47.4     10-11    140  |  108  |  25<H>  ----------------------------<  120<H>  3.8   |  16<L>  |  3.89<H>    Ca    17.0<HH>      11 Oct 2022 06:30  Phos  4.6     10-11  Mg     2.3     10-11    TPro  6.7  /  Alb  3.4  /  TBili  0.6  /  DBili  x   /  AST  20  /  ALT  12  /  AlkPhos  100  10-11        Urinalysis Basic - ( 10 Oct 2022 05:16 )    Color: Yellow / Appearance: Clear / SG: >=1.030 / pH: x  Gluc: x / Ketone: 15 mg/dL  / Bili: Negative / Urobili: 0.2 E.U./dL   Blood: x / Protein: 100 mg/dL / Nitrite: NEGATIVE   Leuk Esterase: NEGATIVE / RBC: Many /HPF / WBC < 5 /HPF   Sq Epi: x / Non Sq Epi: 0-5 /HPF / Bacteria: Present /HPF            RADIOLOGY & ADDITIONAL STUDIES:           Patient is a 52y Male seen and evaluated at bedside. Overnight events noted. Ca back up to 17, with SCr 3.89. Pt with 3.6 L UO. s/p 4 doses of calcitonin. Pt denies any new symptoms.      Meds:    acetaminophen     Tablet .. 650 every 6 hours PRN  aluminum hydroxide/magnesium hydroxide/simethicone Suspension 30 every 6 hours PRN  amLODIPine   Tablet 5 daily  apixaban 2.5 every 12 hours  aspirin enteric coated 81 daily  atorvastatin 80 at bedtime  levothyroxine 25 daily  melatonin 3 at bedtime PRN  sodium chloride 0.9%. 3000 <Continuous>  sodium chloride 0.9%. 3000 <Continuous>  sodium chloride 0.9%. 3000 <Continuous>  sodium chloride 0.9%. 3000 <Continuous>      T(C): , Max: 37.1 (10-10-22 @ 22:09)  T(F): , Max: 98.7 (10-10-22 @ 22:09)  HR: 100 (10-11-22 @ 08:39)  BP: 170/102 (10-11-22 @ 08:39)  BP(mean): 127 (10-11-22 @ 08:39)  RR: 18 (10-11-22 @ 08:39)  SpO2: 97% (10-11-22 @ 08:39)  Wt(kg): --    10-10 @ 07:01  -  10-11 @ 07:00  --------------------------------------------------------  IN: 0 mL / OUT: 3600 mL / NET: -3600 mL          Review of Systems:  ROS negative except as per HPI      PHYSICAL EXAM:  General: NAD, AAOX3  Cardiovascular: S1, S2 normal; RRR, no M/G/R  Respiratory: CTABL; no W/R/R  Gastrointestinal: soft. +mild, diffuse tenderness. ND. no rebound or guarding.   Extremities: WWP; no edema, clubbing or cyanosis  Neurological: AAOx3; No focal deficits noted    LABS:                        16.8   15.37 )-----------( 260      ( 11 Oct 2022 06:30 )             47.4     10-11    140  |  108  |  25<H>  ----------------------------<  120<H>  3.8   |  16<L>  |  3.89<H>    Ca    17.0<HH>      11 Oct 2022 06:30  Phos  4.6     10-11  Mg     2.3     10-11    TPro  6.7  /  Alb  3.4  /  TBili  0.6  /  DBili  x   /  AST  20  /  ALT  12  /  AlkPhos  100  10-11        Urinalysis Basic - ( 10 Oct 2022 05:16 )    Color: Yellow / Appearance: Clear / SG: >=1.030 / pH: x  Gluc: x / Ketone: 15 mg/dL  / Bili: Negative / Urobili: 0.2 E.U./dL   Blood: x / Protein: 100 mg/dL / Nitrite: NEGATIVE   Leuk Esterase: NEGATIVE / RBC: Many /HPF / WBC < 5 /HPF   Sq Epi: x / Non Sq Epi: 0-5 /HPF / Bacteria: Present /HPF            RADIOLOGY & ADDITIONAL STUDIES:

## 2022-10-11 NOTE — PROGRESS NOTE ADULT - PROBLEM SELECTOR PLAN 3
Leukocytosis to 12.27 on admission. Patient presenting w/ 3-4 days of nausea and vomiting. Possibly reactive leukocytosis iso dehydration and decreased po intake.    - WBC still elevated with neutrophilic predominance, no signs of acute infection  - continue to trend CT Chest w/o Contrast 10/11: Thick-walled cystic and/or necrotic mediastinal lesion measuring 3.5 x 2.4 x 3.2 described above, surrounding regional lymphadenopathy or significant fat planes stranding. DDx: bronchopulmonary malformation such as a bronchogenic and esophageal duplication cyst. Cystic degeneration of a malignant mediastinal lesion including lymphoma and necrotic lymphadenopathy.    - f/u Thyroid US & MRI for further characterization  - endocrine, ENT, and renal following. will f/u recs

## 2022-10-11 NOTE — PROGRESS NOTE ADULT - PROBLEM SELECTOR PLAN 6
Home med Levothyroxine 25mcg qd.    - c/w home med Patient takes Amlodipine 5mg qd and Lisinopril-hydrochlorothiazide 20-12.5 mg qd.    - holding Lisinopril-HCTZ as can worsen hypercalcemia  - continue amlodipine 5mg qd

## 2022-10-11 NOTE — CONSULT NOTE ADULT - SUBJECTIVE AND OBJECTIVE BOX
51 yo M w/ PMHx HTN, T2DM, HLD, Hypothyroidism, CVA (most recently admitted ~4w ago, admitted at [facility], R weakness, slurred speech) presents with 3-4d of multiple NBNB nausea and vomiting. In the ED, he was found to have a calcium of 17.1. He says he hasn't been able to keep down any liquids/food. Also, per the son over the past few weeks he has seemed more lethargic and not himself. 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 He takes Lisinopril-HCTZ daily for HTN. He denies recent tums or calcium supplementation. Most recent Calcium 9/3 at [facility] was 11. No other systemic symptoms. Denies HA, confusion, f/c, cough, sore throat, SOB, CP, abd pain, urinary symptoms, new weakness/numbness, black/bloody stool, rashes.    HPI: 52y Male    Allergies    No Known Allergies    Intolerances        PAST MEDICAL & SURGICAL HISTORY:  Stroke      HTN (hypertension)      Diabetes      Hypothyroidism      Hyperlipidemia          MEDICATIONS:  Antiinfectives:     Hematologic/Anticoagulation:  apixaban 2.5 milliGRAM(s) Oral every 12 hours  aspirin enteric coated 81 milliGRAM(s) Oral daily    Pain medications/Neuro:  acetaminophen     Tablet .. 650 milliGRAM(s) Oral every 6 hours PRN  melatonin 3 milliGRAM(s) Oral at bedtime PRN    IV fluids:  sodium chloride 0.9%. 3000 milliLiter(s) IV Continuous <Continuous>  sodium chloride 0.9%. 3000 milliLiter(s) IV Continuous <Continuous>  sodium chloride 0.9%. 3000 milliLiter(s) IV Continuous <Continuous>  sodium chloride 0.9%. 3000 milliLiter(s) IV Continuous <Continuous>    Endocrine Medications:   atorvastatin 80 milliGRAM(s) Oral at bedtime  levothyroxine 25 MICROGram(s) Oral daily    All other standing medications:   amLODIPine   Tablet 5 milliGRAM(s) Oral daily    All other PRN medications:  aluminum hydroxide/magnesium hydroxide/simethicone Suspension 30 milliLiter(s) Oral every 6 hours PRN      SOCIAL HISTORY:  Tobacco History:  ETOH Use:   Drug Use:     FAMILY HISTORY:      REVIEW OF SYSTEMS:     LABS:  CBC-                        16.8   15.37 )-----------( 260      ( 11 Oct 2022 06:30 )             47.4         10-11    140  |  108  |  25<H>  ----------------------------<  120<H>  3.8   |  16<L>  |  3.89<H>    Ca    17.0<HH>      11 Oct 2022 06:30  Phos  4.6     10-11  Mg     2.3     10-11    TPro  6.7  /  Alb  3.4  /  TBili  0.6  /  DBili  x   /  AST  20  /  ALT  12  /  AlkPhos  100  10-11    Coagulation Studies-    Endocrine Panel-  --  --  17.0 mg/dL  --  --  16.1 mg/dL  --  --  14.8 mg/dL  --  --  15.1 mg/dL      Vital Signs Last 24 Hrs  T(C): 36.9 (11 Oct 2022 05:31), Max: 37.1 (10 Oct 2022 22:09)  T(F): 98.5 (11 Oct 2022 05:31), Max: 98.7 (10 Oct 2022 22:09)  HR: 100 (11 Oct 2022 08:39) (91 - 104)  BP: 170/102 (11 Oct 2022 08:39) (134/100 - 194/96)  BP(mean): 127 (11 Oct 2022 08:39) (107 - 138)  RR: 18 (11 Oct 2022 08:39) (18 - 20)  SpO2: 97% (11 Oct 2022 08:39) (94% - 97%)    Parameters below as of 11 Oct 2022 08:39  Patient On (Oxygen Delivery Method): room air      PHYSICAL EXAM:  ENT EXAM-   Constitutional: Well-developed, well-nourished.  No hoarseness.     Head:  normocephalic, atraumatic.   Ears:  Ear canals both clear.  Tympanic membranes both intact; no effusion or retraction.  Nose:  Septum **intact / midline / deviated**.  Inferior turbinates normal bilateral  OC/OP:  Floor of mouth, buccal mucosa, lips, hard palate, soft palate, uvula, posterior pharyngeal wall normal.  Mucosa moist.  Neck:  Trachea midline.  Thyroid, parotid and submandibular glands normal.  Lymph:  No cervical adenopathy.  Facial Plastics:   MULTISYSTEM EXAM-  Neuro/Psych:  A&O x 3.  Mood stable.  Affect bright.  Cranial nerves: 2-12 grossly intact bilaterally.  Eyes:  EOMI, no nystagmus.  Pulm:  No dyspnea, non-labored breathing  Cardiovascular: Carotid pulses 2+ bilaterally.  No periphreal edema.  Skin:  No rash or lesions on exposed skin of head/neck    Nasal Endoscopy Findings:  -use additional template under Nasal Endoscopy Procedure    Laryngoscopy Findings:   -use additional template under Laryngoscopy Procedure    RADIOLOGY & ADDITIONAL STUDIES:      A/P:  52y Male      Thank you for the consult, please page ENT at [phone] with any questions/concerns.  ----------------------------------------------------    Marianna Major MD  Department of Otolaryngology - Head and Neck Surgery  [facility]   HPI: 52M PMH HTN, T2DM, HLD, Hypothyroidism, CVA 4 wks ago w/ residual R weakness and slurred speech admitted 10/9 for 3-4d of multiple NBNB nausea and vomiting. In the ED, he was found to have a calcium of 17.1. Hasn't been able to keep down any liquids/food. Over the past few weeks he has seemed more lethargic and not himself. 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 He takes Lisinopril-HCTZ daily for HTN. He denies recent Tums or calcium supplementation. No other systemic symptoms. Denies HA, confusion, f/c, cough, sore throat, SOB, CP, abd pain, urinary symptoms, new weakness/numbness, black/bloody stool, rashes. Patient with h/o hypercalcemia for more than 2 years, last level was 11 mg/dL (9/3/22 at [facility]). Per patient, he has been told that his calcium was high in the past, and that he might need surgery; however, further management of hypercalcemia was postponed due to stroke.  and Vitamin D-OH was 40.8. Today Ca 17, on bisphosphonates and s/p calcitonin. Creatinine elevated to 3.89. Hypercalcemia is likely to be secondary to primary hyperparathyroidism.        Allergies    No Known Allergies    Intolerances        PAST MEDICAL & SURGICAL HISTORY:  Stroke      HTN (hypertension)      Diabetes      Hypothyroidism      Hyperlipidemia          MEDICATIONS:  Antiinfectives:     Hematologic/Anticoagulation:  apixaban 2.5 milliGRAM(s) Oral every 12 hours  aspirin enteric coated 81 milliGRAM(s) Oral daily    Pain medications/Neuro:  acetaminophen     Tablet .. 650 milliGRAM(s) Oral every 6 hours PRN  melatonin 3 milliGRAM(s) Oral at bedtime PRN    IV fluids:  sodium chloride 0.9%. 3000 milliLiter(s) IV Continuous <Continuous>  sodium chloride 0.9%. 3000 milliLiter(s) IV Continuous <Continuous>  sodium chloride 0.9%. 3000 milliLiter(s) IV Continuous <Continuous>  sodium chloride 0.9%. 3000 milliLiter(s) IV Continuous <Continuous>    Endocrine Medications:   atorvastatin 80 milliGRAM(s) Oral at bedtime  levothyroxine 25 MICROGram(s) Oral daily    All other standing medications:   amLODIPine   Tablet 5 milliGRAM(s) Oral daily    All other PRN medications:  aluminum hydroxide/magnesium hydroxide/simethicone Suspension 30 milliLiter(s) Oral every 6 hours PRN      SOCIAL HISTORY: see above, otherwise non-contributory    FAMILY HISTORY: non-contributory      REVIEW OF SYSTEMS: otherwise negative    LABS:  CBC-                        16.8   15.37 )-----------( 260      ( 11 Oct 2022 06:30 )             47.4         10-11    140  |  108  |  25<H>  ----------------------------<  120<H>  3.8   |  16<L>  |  3.89<H>    Ca    17.0<HH>      11 Oct 2022 06:30  Phos  4.6     10-11  Mg     2.3     10-11    TPro  6.7  /  Alb  3.4  /  TBili  0.6  /  DBili  x   /  AST  20  /  ALT  12  /  AlkPhos  100  10-11    Coagulation Studies-    Endocrine Panel-  --  --  17.0 mg/dL  --  --  16.1 mg/dL  --  --  14.8 mg/dL  --  --  15.1 mg/dL      Vital Signs Last 24 Hrs  T(C): 36.9 (11 Oct 2022 05:31), Max: 37.1 (10 Oct 2022 22:09)  T(F): 98.5 (11 Oct 2022 05:31), Max: 98.7 (10 Oct 2022 22:09)  HR: 100 (11 Oct 2022 08:39) (91 - 104)  BP: 170/102 (11 Oct 2022 08:39) (134/100 - 194/96)  BP(mean): 127 (11 Oct 2022 08:39) (107 - 138)  RR: 18 (11 Oct 2022 08:39) (18 - 20)  SpO2: 97% (11 Oct 2022 08:39) (94% - 97%)    Parameters below as of 11 Oct 2022 08:39  Patient On (Oxygen Delivery Method): room air      PHYSICAL EXAM:  General: NAD, AAOX3  Neck: soft/flat, no LAD  Respiratory: no respiratory distress, stridor, or stertor  Gastrointestinal: soft. +mild, diffuse tenderness.   Extremities: WWP; no edema  Neurological: AAOx3; No focal deficits noted        RADIOLOGY & ADDITIONAL STUDIES: reviewed      A/P:  52y Male PMH HTN, T2DM, HLD, Hypothyroidism, CVA 4 wks ago w/ residual R weakness and slurred speech admitted 10/9 for 3-4d of multiple NBNB nausea and vomiting found to have elevated Ca to 17, , vit d 40.8.   -please obtain sestamibi scan and thyroid ultrasound  -f/u Endocrine consult  -will follow        Thank you for the consult, please page ENT at [phone] with any questions/concerns.  ----------------------------------------------------    Marianna Major MD  Department of Otolaryngology - Head and Neck Surgery  [facility]   HPI: 52M PMH HTN, T2DM, HLD, Hypothyroidism, CVA 4 wks ago w/ residual R weakness and slurred speech admitted 10/9 for 3-4d of multiple NBNB nausea and vomiting. In the ED, he was found to have a calcium of 17.1. Hasn't been able to keep down any liquids/food. Over the past few weeks he has seemed more lethargic and not himself. 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 He takes Lisinopril-HCTZ daily for HTN. He denies recent Tums or calcium supplementation. No other systemic symptoms. Denies HA, confusion, f/c, cough, sore throat, SOB, CP, abd pain, urinary symptoms, new weakness/numbness, black/bloody stool, rashes. Patient with h/o hypercalcemia for more than 2 years, last level was 11 mg/dL (9/3/22 at [facility]). Per patient, he has been told that his calcium was high in the past, and that he might need surgery; however, further management of hypercalcemia was postponed due to stroke.  and Vitamin D-OH was 40.8. Today Ca 17, on bisphosphonates and s/p calcitonin. Creatinine elevated to 3.89. Hypercalcemia is likely to be secondary to primary hyperparathyroidism.        Allergies    No Known Allergies    Intolerances        PAST MEDICAL & SURGICAL HISTORY:  Stroke      HTN (hypertension)      Diabetes      Hypothyroidism      Hyperlipidemia          MEDICATIONS:  Antiinfectives:     Hematologic/Anticoagulation:  apixaban 2.5 milliGRAM(s) Oral every 12 hours  aspirin enteric coated 81 milliGRAM(s) Oral daily    Pain medications/Neuro:  acetaminophen     Tablet .. 650 milliGRAM(s) Oral every 6 hours PRN  melatonin 3 milliGRAM(s) Oral at bedtime PRN    IV fluids:  sodium chloride 0.9%. 3000 milliLiter(s) IV Continuous <Continuous>  sodium chloride 0.9%. 3000 milliLiter(s) IV Continuous <Continuous>  sodium chloride 0.9%. 3000 milliLiter(s) IV Continuous <Continuous>  sodium chloride 0.9%. 3000 milliLiter(s) IV Continuous <Continuous>    Endocrine Medications:   atorvastatin 80 milliGRAM(s) Oral at bedtime  levothyroxine 25 MICROGram(s) Oral daily    All other standing medications:   amLODIPine   Tablet 5 milliGRAM(s) Oral daily    All other PRN medications:  aluminum hydroxide/magnesium hydroxide/simethicone Suspension 30 milliLiter(s) Oral every 6 hours PRN      SOCIAL HISTORY: see above, otherwise non-contributory    FAMILY HISTORY: non-contributory      REVIEW OF SYSTEMS: otherwise negative    LABS:  CBC-                        16.8   15.37 )-----------( 260      ( 11 Oct 2022 06:30 )             47.4         10-11    140  |  108  |  25<H>  ----------------------------<  120<H>  3.8   |  16<L>  |  3.89<H>    Ca    17.0<HH>      11 Oct 2022 06:30  Phos  4.6     10-11  Mg     2.3     10-11    TPro  6.7  /  Alb  3.4  /  TBili  0.6  /  DBili  x   /  AST  20  /  ALT  12  /  AlkPhos  100  10-11    Coagulation Studies-    Endocrine Panel-  --  --  17.0 mg/dL  --  --  16.1 mg/dL  --  --  14.8 mg/dL  --  --  15.1 mg/dL      Vital Signs Last 24 Hrs  T(C): 36.9 (11 Oct 2022 05:31), Max: 37.1 (10 Oct 2022 22:09)  T(F): 98.5 (11 Oct 2022 05:31), Max: 98.7 (10 Oct 2022 22:09)  HR: 100 (11 Oct 2022 08:39) (91 - 104)  BP: 170/102 (11 Oct 2022 08:39) (134/100 - 194/96)  BP(mean): 127 (11 Oct 2022 08:39) (107 - 138)  RR: 18 (11 Oct 2022 08:39) (18 - 20)  SpO2: 97% (11 Oct 2022 08:39) (94% - 97%)    Parameters below as of 11 Oct 2022 08:39  Patient On (Oxygen Delivery Method): room air      PHYSICAL EXAM:  General: NAD, AAOX3  Neck: soft/flat, no LAD  Respiratory: no respiratory distress, stridor, or stertor  Gastrointestinal: soft. +mild, diffuse tenderness.   Extremities: WWP; no edema  Neurological: AAOx3; No focal deficits noted        RADIOLOGY & ADDITIONAL STUDIES: reviewed      A/P:  52y Male PMH HTN, T2DM, HLD, Hypothyroidism, CVA 4 wks ago w/ residual R weakness and slurred speech admitted 10/9 for 3-4d of multiple NBNB nausea and vomiting found to have elevated Ca to 17, , vit d 40.8. Also with ARPITA Cr 3.89.   -please obtain sestamibi scan and thyroid ultrasound  -f/u Endocrine consult  -will follow        Thank you for the consult, please page ENT at [phone] with any questions/concerns.  ----------------------------------------------------    Marianna Major MD  Department of Otolaryngology - Head and Neck Surgery  [facility]   HPI: 52M PMH HTN, T2DM, HLD, Hypothyroidism, CVA 4 wks ago w/ residual R weakness and slurred speech admitted 10/9 for 3-4d of multiple NBNB nausea and vomiting. In the ED, he was found to have a calcium of 17.1. Hasn't been able to keep down any liquids/food. Over the past few weeks he has seemed more lethargic and not himself. 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 He takes Lisinopril-HCTZ daily for HTN. He denies recent Tums or calcium supplementation. No other systemic symptoms. Denies HA, confusion, f/c, cough, sore throat, SOB, CP, abd pain, urinary symptoms, new weakness/numbness, black/bloody stool, rashes. Patient with h/o hypercalcemia for more than 2 years, last level was 11 mg/dL (9/3/22 at [facility]). Per patient, he has been told that his calcium was high in the past, and that he might need surgery; however, further management of hypercalcemia was postponed due to stroke.  and Vitamin D-OH was 40.8. Today Ca 17, on bisphosphonates and s/p calcitonin. Creatinine elevated to 3.89. Hypercalcemia is likely to be secondary to primary hyperparathyroidism.      ENT consulted for primary hyperpara. had been seen by [facility] endo in the past for hypercalcemia but as far as they can recollect they are not sure of the etiololgy or if there was any adenoma detected in the workup. on xarelto for recent stroke.       Allergies    No Known Allergies    Intolerances        PAST MEDICAL & SURGICAL HISTORY:  Stroke      HTN (hypertension)      Diabetes      Hypothyroidism      Hyperlipidemia          MEDICATIONS:  Antiinfectives:     Hematologic/Anticoagulation:  apixaban 2.5 milliGRAM(s) Oral every 12 hours  aspirin enteric coated 81 milliGRAM(s) Oral daily    Pain medications/Neuro:  acetaminophen     Tablet .. 650 milliGRAM(s) Oral every 6 hours PRN  melatonin 3 milliGRAM(s) Oral at bedtime PRN    IV fluids:  sodium chloride 0.9%. 3000 milliLiter(s) IV Continuous <Continuous>  sodium chloride 0.9%. 3000 milliLiter(s) IV Continuous <Continuous>  sodium chloride 0.9%. 3000 milliLiter(s) IV Continuous <Continuous>  sodium chloride 0.9%. 3000 milliLiter(s) IV Continuous <Continuous>    Endocrine Medications:   atorvastatin 80 milliGRAM(s) Oral at bedtime  levothyroxine 25 MICROGram(s) Oral daily    All other standing medications:   amLODIPine   Tablet 5 milliGRAM(s) Oral daily    All other PRN medications:  aluminum hydroxide/magnesium hydroxide/simethicone Suspension 30 milliLiter(s) Oral every 6 hours PRN      SOCIAL HISTORY: see above, otherwise non-contributory    FAMILY HISTORY: non-contributory      REVIEW OF SYSTEMS: otherwise negative    LABS:  CBC-                        16.8   15.37 )-----------( 260      ( 11 Oct 2022 06:30 )             47.4         10-11    140  |  108  |  25<H>  ----------------------------<  120<H>  3.8   |  16<L>  |  3.89<H>    Ca    17.0<HH>      11 Oct 2022 06:30  Phos  4.6     10-11  Mg     2.3     10-11    TPro  6.7  /  Alb  3.4  /  TBili  0.6  /  DBili  x   /  AST  20  /  ALT  12  /  AlkPhos  100  10-11    Coagulation Studies-    Endocrine Panel-  --  --  17.0 mg/dL  --  --  16.1 mg/dL  --  --  14.8 mg/dL  --  --  15.1 mg/dL      Vital Signs Last 24 Hrs  T(C): 36.9 (11 Oct 2022 05:31), Max: 37.1 (10 Oct 2022 22:09)  T(F): 98.5 (11 Oct 2022 05:31), Max: 98.7 (10 Oct 2022 22:09)  HR: 100 (11 Oct 2022 08:39) (91 - 104)  BP: 170/102 (11 Oct 2022 08:39) (134/100 - 194/96)  BP(mean): 127 (11 Oct 2022 08:39) (107 - 138)  RR: 18 (11 Oct 2022 08:39) (18 - 20)  SpO2: 97% (11 Oct 2022 08:39) (94% - 97%)    Parameters below as of 11 Oct 2022 08:39  Patient On (Oxygen Delivery Method): room air      PHYSICAL EXAM:  General: NAD, AAOX3  Neck: soft/flat, no LAD  Respiratory: no respiratory distress, stridor, or stertor  Extremities: WWP; no edema  Neurological: AAOx3; No focal deficits noted        RADIOLOGY & ADDITIONAL STUDIES: reviewed      A/P:  52y Male PMH HTN, T2DM, HLD, Hypothyroidism, CVA 4 wks ago w/ residual R weakness and slurred speech admitted 10/9 for 3-4d of multiple NBNB nausea and vomiting found to have elevated Ca to 17, , vit d 40.8. Also with ARPITA Cr 3.89.   -please obtain sestamibi scan and thyroid ultrasound  -f/u Endocrine consult  - please hold xarelto and restart with other a/c which may be shorter acting such as hep gtt or lovenox. defer to primary team on choice of agent  - we will follow along  - discussed with Dr Medina        Thank you for the consult, please page ENT at [phone] with any questions/concerns.  ----------------------------------------------------  Department of Otolaryngology - Head and Neck Surgery  [facility]

## 2022-10-11 NOTE — PROGRESS NOTE ADULT - SUBJECTIVE AND OBJECTIVE BOX
OVERNIGHT EVENTS:    SUBJECTIVE / INTERVAL HPI: Patient seen and examined at bedside. Nauseous, has not eaten much because of low appetite & nausea    VITAL SIGNS:  Vital Signs Last 24 Hrs  T(C): 36.4 (11 Oct 2022 10:00), Max: 37.1 (10 Oct 2022 22:09)  T(F): 97.5 (11 Oct 2022 10:00), Max: 98.7 (10 Oct 2022 22:09)  HR: 102 (11 Oct 2022 11:50) (91 - 104)  BP: 177/103 (11 Oct 2022 11:50) (134/100 - 194/96)  BP(mean): 134 (11 Oct 2022 11:50) (107 - 138)  RR: 18 (11 Oct 2022 11:50) (18 - 20)  SpO2: 98% (11 Oct 2022 11:50) (94% - 98%)    Parameters below as of 11 Oct 2022 11:50  Patient On (Oxygen Delivery Method): room air        PHYSICAL EXAM:  General: NAD  HEENT: NCAT  Neck: supple, trachea midline  Cardiovascular: S1, S2 normal; RRR, no M/G/R  Respiratory: CTABL; no W/R/R  Gastrointestinal: soft. +mild, diffuse tenderness, improved. ND. no rebound or guarding.   Skin: no ulcerations or visible rashes appreciated  Extremities: WWP; no edema, clubbing or cyanosis  Vascular: 2+ DP pulses.   Neurological: AAOx3; CN II-XII grossly intact; no focal deficits      MEDICATIONS:  MEDICATIONS  (STANDING):  amLODIPine   Tablet 5 milliGRAM(s) Oral daily  apixaban 2.5 milliGRAM(s) Oral every 12 hours  aspirin enteric coated 81 milliGRAM(s) Oral daily  atorvastatin 80 milliGRAM(s) Oral at bedtime  levothyroxine 25 MICROGram(s) Oral daily  sodium chloride 0.9%. 3000 milliLiter(s) (250 mL/Hr) IV Continuous <Continuous>  sodium chloride 0.9%. 3000 milliLiter(s) (250 mL/Hr) IV Continuous <Continuous>  sodium chloride 0.9%. 3000 milliLiter(s) (250 mL/Hr) IV Continuous <Continuous>  sodium chloride 0.9%. 3000 milliLiter(s) (250 mL/Hr) IV Continuous <Continuous>    MEDICATIONS  (PRN):  acetaminophen     Tablet .. 650 milliGRAM(s) Oral every 6 hours PRN Temp greater or equal to 38C (100.4F), Mild Pain (1 - 3)  aluminum hydroxide/magnesium hydroxide/simethicone Suspension 30 milliLiter(s) Oral every 6 hours PRN Dyspepsia  melatonin 3 milliGRAM(s) Oral at bedtime PRN Insomnia  ondansetron Injectable 4 milliGRAM(s) IV Push every 6 hours PRN Nausea and/or Vomiting      ALLERGIES:  Allergies    No Known Allergies    Intolerances        LABS:                        16.8   15.37 )-----------( 260      ( 11 Oct 2022 06:30 )             47.4     10-11    140  |  108  |  25<H>  ----------------------------<  120<H>  3.8   |  16<L>  |  3.89<H>    Ca    17.0<HH>      11 Oct 2022 06:30  Phos  4.6     10-11  Mg     2.3     10-11    TPro  6.7  /  Alb  3.4  /  TBili  0.6  /  DBili  x   /  AST  20  /  ALT  12  /  AlkPhos  100  10-11      Urinalysis Basic - ( 10 Oct 2022 05:16 )    Color: Yellow / Appearance: Clear / SG: >=1.030 / pH: x  Gluc: x / Ketone: 15 mg/dL  / Bili: Negative / Urobili: 0.2 E.U./dL   Blood: x / Protein: 100 mg/dL / Nitrite: NEGATIVE   Leuk Esterase: NEGATIVE / RBC: Many /HPF / WBC < 5 /HPF   Sq Epi: x / Non Sq Epi: 0-5 /HPF / Bacteria: Present /HPF      CAPILLARY BLOOD GLUCOSE          RADIOLOGY & ADDITIONAL TESTS: Reviewed. OVERNIGHT EVENTS: SBP elevated to 190s, gave 5mg hydralazine    SUBJECTIVE / INTERVAL HPI: Patient seen and examined at bedside. Nauseous, has not eaten much because of low appetite & nausea    VITAL SIGNS:  Vital Signs Last 24 Hrs  T(C): 36.4 (11 Oct 2022 10:00), Max: 37.1 (10 Oct 2022 22:09)  T(F): 97.5 (11 Oct 2022 10:00), Max: 98.7 (10 Oct 2022 22:09)  HR: 102 (11 Oct 2022 11:50) (91 - 104)  BP: 177/103 (11 Oct 2022 11:50) (134/100 - 194/96)  BP(mean): 134 (11 Oct 2022 11:50) (107 - 138)  RR: 18 (11 Oct 2022 11:50) (18 - 20)  SpO2: 98% (11 Oct 2022 11:50) (94% - 98%)    Parameters below as of 11 Oct 2022 11:50  Patient On (Oxygen Delivery Method): room air        PHYSICAL EXAM:  General: NAD  HEENT: NCAT  Neck: supple, trachea midline  Cardiovascular: S1, S2 normal; RRR, no M/G/R  Respiratory: CTABL; no W/R/R  Gastrointestinal: soft. +mild, diffuse tenderness, improved. ND. no rebound or guarding.   Skin: no ulcerations or visible rashes appreciated  Extremities: WWP; no edema, clubbing or cyanosis  Vascular: 2+ DP pulses.   Neurological: AAOx3; CN II-XII grossly intact; no focal deficits      MEDICATIONS:  MEDICATIONS  (STANDING):  amLODIPine   Tablet 5 milliGRAM(s) Oral daily  apixaban 2.5 milliGRAM(s) Oral every 12 hours  aspirin enteric coated 81 milliGRAM(s) Oral daily  atorvastatin 80 milliGRAM(s) Oral at bedtime  levothyroxine 25 MICROGram(s) Oral daily  sodium chloride 0.9%. 3000 milliLiter(s) (250 mL/Hr) IV Continuous <Continuous>  sodium chloride 0.9%. 3000 milliLiter(s) (250 mL/Hr) IV Continuous <Continuous>  sodium chloride 0.9%. 3000 milliLiter(s) (250 mL/Hr) IV Continuous <Continuous>  sodium chloride 0.9%. 3000 milliLiter(s) (250 mL/Hr) IV Continuous <Continuous>    MEDICATIONS  (PRN):  acetaminophen     Tablet .. 650 milliGRAM(s) Oral every 6 hours PRN Temp greater or equal to 38C (100.4F), Mild Pain (1 - 3)  aluminum hydroxide/magnesium hydroxide/simethicone Suspension 30 milliLiter(s) Oral every 6 hours PRN Dyspepsia  melatonin 3 milliGRAM(s) Oral at bedtime PRN Insomnia  ondansetron Injectable 4 milliGRAM(s) IV Push every 6 hours PRN Nausea and/or Vomiting      ALLERGIES:  Allergies    No Known Allergies    Intolerances        LABS:                        16.8   15.37 )-----------( 260      ( 11 Oct 2022 06:30 )             47.4     10-11    140  |  108  |  25<H>  ----------------------------<  120<H>  3.8   |  16<L>  |  3.89<H>    Ca    17.0<HH>      11 Oct 2022 06:30  Phos  4.6     10-11  Mg     2.3     10-11    TPro  6.7  /  Alb  3.4  /  TBili  0.6  /  DBili  x   /  AST  20  /  ALT  12  /  AlkPhos  100  10-11      Urinalysis Basic - ( 10 Oct 2022 05:16 )    Color: Yellow / Appearance: Clear / SG: >=1.030 / pH: x  Gluc: x / Ketone: 15 mg/dL  / Bili: Negative / Urobili: 0.2 E.U./dL   Blood: x / Protein: 100 mg/dL / Nitrite: NEGATIVE   Leuk Esterase: NEGATIVE / RBC: Many /HPF / WBC < 5 /HPF   Sq Epi: x / Non Sq Epi: 0-5 /HPF / Bacteria: Present /HPF      CAPILLARY BLOOD GLUCOSE          RADIOLOGY & ADDITIONAL TESTS: Reviewed.  CT Chest Non-Con 10/11:     1. Thick-walled cystic and/or necrotic mediastinal lesion measuring 3.5 x   2.4 x 3.2 described above. There is surrounding regional lymphadenopathy   or significant fat planes stranding. Abbreviated differential includes a   bronchopulmonary malformation such as a bronchogenic and esophageal   duplication cyst. Cystic degeneration of a malignant mediastinal lesion   including lymphoma and necrotic lymphadenopathy. Further characterization   with a thoracic MRI as well as whole-body PET/CT and ultimately   histological sampling.  2. No surrounding lymphadenopathy or fat plane stranding.  3. Shaped-like endplates involving the thoracic spine. Wrist may be   degenerative. Possibility of sickle cell disease cannot be excluded.    The results of this examination were verbally communicated with read back   to the resident physician, on 10/11/2022 at 11:57 AM.

## 2022-10-12 DIAGNOSIS — I16.0 HYPERTENSIVE URGENCY: ICD-10-CM

## 2022-10-12 DIAGNOSIS — J18.9 PNEUMONIA, UNSPECIFIED ORGANISM: ICD-10-CM

## 2022-10-12 LAB
ALBUMIN SERPL ELPH-MCNC: 3.1 G/DL — LOW (ref 3.3–5)
ALP SERPL-CCNC: 87 U/L — SIGNIFICANT CHANGE UP (ref 40–120)
ALT FLD-CCNC: 10 U/L — SIGNIFICANT CHANGE UP (ref 10–45)
ANION GAP SERPL CALC-SCNC: 11 MMOL/L — SIGNIFICANT CHANGE UP (ref 5–17)
ANION GAP SERPL CALC-SCNC: 11 MMOL/L — SIGNIFICANT CHANGE UP (ref 5–17)
AST SERPL-CCNC: 13 U/L — SIGNIFICANT CHANGE UP (ref 10–40)
BASOPHILS # BLD AUTO: 0 K/UL — SIGNIFICANT CHANGE UP (ref 0–0.2)
BASOPHILS NFR BLD AUTO: 0 % — SIGNIFICANT CHANGE UP (ref 0–2)
BILIRUB SERPL-MCNC: 0.6 MG/DL — SIGNIFICANT CHANGE UP (ref 0.2–1.2)
BUN SERPL-MCNC: 28 MG/DL — HIGH (ref 7–23)
BUN SERPL-MCNC: 28 MG/DL — HIGH (ref 7–23)
BURR CELLS BLD QL SMEAR: PRESENT — SIGNIFICANT CHANGE UP
CALCIUM SERPL-MCNC: 15.6 MG/DL — CRITICAL HIGH (ref 8.4–10.5)
CALCIUM SERPL-MCNC: 16.4 MG/DL — CRITICAL HIGH (ref 8.4–10.5)
CHLORIDE SERPL-SCNC: 112 MMOL/L — HIGH (ref 96–108)
CHLORIDE SERPL-SCNC: 112 MMOL/L — HIGH (ref 96–108)
CO2 SERPL-SCNC: 20 MMOL/L — LOW (ref 22–31)
CO2 SERPL-SCNC: 20 MMOL/L — LOW (ref 22–31)
CREAT SERPL-MCNC: 4.11 MG/DL — HIGH (ref 0.5–1.3)
CREAT SERPL-MCNC: 4.25 MG/DL — HIGH (ref 0.5–1.3)
EGFR: 16 ML/MIN/1.73M2 — LOW
EGFR: 17 ML/MIN/1.73M2 — LOW
EOSINOPHIL # BLD AUTO: 0 K/UL — SIGNIFICANT CHANGE UP (ref 0–0.5)
EOSINOPHIL NFR BLD AUTO: 0 % — SIGNIFICANT CHANGE UP (ref 0–6)
GIANT PLATELETS BLD QL SMEAR: PRESENT — SIGNIFICANT CHANGE UP
GLUCOSE SERPL-MCNC: 111 MG/DL — HIGH (ref 70–99)
GLUCOSE SERPL-MCNC: 123 MG/DL — HIGH (ref 70–99)
HCT VFR BLD CALC: 43.6 % — SIGNIFICANT CHANGE UP (ref 39–50)
HGB BLD-MCNC: 15.4 G/DL — SIGNIFICANT CHANGE UP (ref 13–17)
LYMPHOCYTES # BLD AUTO: 0 % — LOW (ref 13–44)
LYMPHOCYTES # BLD AUTO: 0 K/UL — LOW (ref 1–3.3)
MAGNESIUM SERPL-MCNC: 1.5 MG/DL — LOW (ref 1.6–2.6)
MANUAL SMEAR VERIFICATION: SIGNIFICANT CHANGE UP
MCHC RBC-ENTMCNC: 30.1 PG — SIGNIFICANT CHANGE UP (ref 27–34)
MCHC RBC-ENTMCNC: 35.3 GM/DL — SIGNIFICANT CHANGE UP (ref 32–36)
MCV RBC AUTO: 85.3 FL — SIGNIFICANT CHANGE UP (ref 80–100)
MONOCYTES # BLD AUTO: 0.55 K/UL — SIGNIFICANT CHANGE UP (ref 0–0.9)
MONOCYTES NFR BLD AUTO: 4.3 % — SIGNIFICANT CHANGE UP (ref 2–14)
NEUTROPHILS # BLD AUTO: 12.32 K/UL — HIGH (ref 1.8–7.4)
NEUTROPHILS NFR BLD AUTO: 95.7 % — HIGH (ref 43–77)
OVALOCYTES BLD QL SMEAR: SLIGHT — SIGNIFICANT CHANGE UP
PHOSPHATE SERPL-MCNC: 3.9 MG/DL — SIGNIFICANT CHANGE UP (ref 2.5–4.5)
PLAT MORPH BLD: ABNORMAL
PLATELET # BLD AUTO: 227 K/UL — SIGNIFICANT CHANGE UP (ref 150–400)
POIKILOCYTOSIS BLD QL AUTO: SIGNIFICANT CHANGE UP
POTASSIUM SERPL-MCNC: 3.5 MMOL/L — SIGNIFICANT CHANGE UP (ref 3.5–5.3)
POTASSIUM SERPL-MCNC: 3.7 MMOL/L — SIGNIFICANT CHANGE UP (ref 3.5–5.3)
POTASSIUM SERPL-SCNC: 3.5 MMOL/L — SIGNIFICANT CHANGE UP (ref 3.5–5.3)
POTASSIUM SERPL-SCNC: 3.7 MMOL/L — SIGNIFICANT CHANGE UP (ref 3.5–5.3)
PROT SERPL-MCNC: 6.1 G/DL — SIGNIFICANT CHANGE UP (ref 6–8.3)
RBC # BLD: 5.11 M/UL — SIGNIFICANT CHANGE UP (ref 4.2–5.8)
RBC # FLD: 13.2 % — SIGNIFICANT CHANGE UP (ref 10.3–14.5)
RBC BLD AUTO: ABNORMAL
SODIUM SERPL-SCNC: 143 MMOL/L — SIGNIFICANT CHANGE UP (ref 135–145)
SODIUM SERPL-SCNC: 143 MMOL/L — SIGNIFICANT CHANGE UP (ref 135–145)
WBC # BLD: 12.87 K/UL — HIGH (ref 3.8–10.5)
WBC # FLD AUTO: 12.87 K/UL — HIGH (ref 3.8–10.5)

## 2022-10-12 PROCEDURE — 71045 X-RAY EXAM CHEST 1 VIEW: CPT | Mod: 26

## 2022-10-12 PROCEDURE — 76536 US EXAM OF HEAD AND NECK: CPT | Mod: 26

## 2022-10-12 PROCEDURE — 93010 ELECTROCARDIOGRAM REPORT: CPT

## 2022-10-12 PROCEDURE — 99233 SBSQ HOSP IP/OBS HIGH 50: CPT | Mod: GC

## 2022-10-12 PROCEDURE — 78072 PARATHYRD PLANAR W/SPECT&CT: CPT | Mod: 26

## 2022-10-12 RX ORDER — VANCOMYCIN HCL 1 G
1500 VIAL (EA) INTRAVENOUS ONCE
Refills: 0 | Status: COMPLETED | OUTPATIENT
Start: 2022-10-12 | End: 2022-10-12

## 2022-10-12 RX ORDER — SODIUM CHLORIDE 9 MG/ML
500 INJECTION INTRAMUSCULAR; INTRAVENOUS; SUBCUTANEOUS ONCE
Refills: 0 | Status: COMPLETED | OUTPATIENT
Start: 2022-10-12 | End: 2022-10-12

## 2022-10-12 RX ORDER — LABETALOL HCL 100 MG
10 TABLET ORAL ONCE
Refills: 0 | Status: COMPLETED | OUTPATIENT
Start: 2022-10-12 | End: 2022-10-12

## 2022-10-12 RX ORDER — ACETAMINOPHEN 500 MG
1000 TABLET ORAL ONCE
Refills: 0 | Status: COMPLETED | OUTPATIENT
Start: 2022-10-12 | End: 2022-10-12

## 2022-10-12 RX ORDER — SODIUM CHLORIDE 9 MG/ML
1000 INJECTION INTRAMUSCULAR; INTRAVENOUS; SUBCUTANEOUS
Refills: 0 | Status: DISCONTINUED | OUTPATIENT
Start: 2022-10-12 | End: 2022-10-12

## 2022-10-12 RX ORDER — PIPERACILLIN AND TAZOBACTAM 4; .5 G/20ML; G/20ML
4.5 INJECTION, POWDER, LYOPHILIZED, FOR SOLUTION INTRAVENOUS ONCE
Refills: 0 | Status: COMPLETED | OUTPATIENT
Start: 2022-10-12 | End: 2022-10-12

## 2022-10-12 RX ORDER — ACETAMINOPHEN 500 MG
975 TABLET ORAL EVERY 6 HOURS
Refills: 0 | Status: DISCONTINUED | OUTPATIENT
Start: 2022-10-12 | End: 2022-10-15

## 2022-10-12 RX ORDER — HEPARIN SODIUM 5000 [USP'U]/ML
5000 INJECTION INTRAVENOUS; SUBCUTANEOUS EVERY 8 HOURS
Refills: 0 | Status: DISCONTINUED | OUTPATIENT
Start: 2022-10-12 | End: 2022-10-13

## 2022-10-12 RX ORDER — POTASSIUM CHLORIDE 20 MEQ
40 PACKET (EA) ORAL ONCE
Refills: 0 | Status: COMPLETED | OUTPATIENT
Start: 2022-10-12 | End: 2022-10-12

## 2022-10-12 RX ORDER — MAGNESIUM SULFATE 500 MG/ML
4 VIAL (ML) INJECTION ONCE
Refills: 0 | Status: COMPLETED | OUTPATIENT
Start: 2022-10-12 | End: 2022-10-12

## 2022-10-12 RX ORDER — SODIUM CHLORIDE 9 MG/ML
1000 INJECTION INTRAMUSCULAR; INTRAVENOUS; SUBCUTANEOUS
Refills: 0 | Status: DISCONTINUED | OUTPATIENT
Start: 2022-10-12 | End: 2022-10-13

## 2022-10-12 RX ORDER — PIPERACILLIN AND TAZOBACTAM 4; .5 G/20ML; G/20ML
4.5 INJECTION, POWDER, LYOPHILIZED, FOR SOLUTION INTRAVENOUS
Qty: 0 | Refills: 0 | DISCHARGE
Start: 2022-10-12 | End: 2022-10-18

## 2022-10-12 RX ORDER — PIPERACILLIN AND TAZOBACTAM 4; .5 G/20ML; G/20ML
4.5 INJECTION, POWDER, LYOPHILIZED, FOR SOLUTION INTRAVENOUS EVERY 12 HOURS
Refills: 0 | Status: DISCONTINUED | OUTPATIENT
Start: 2022-10-13 | End: 2022-10-15

## 2022-10-12 RX ORDER — ENOXAPARIN SODIUM 100 MG/ML
40 INJECTION SUBCUTANEOUS EVERY 24 HOURS
Refills: 0 | Status: DISCONTINUED | OUTPATIENT
Start: 2022-10-12 | End: 2022-10-12

## 2022-10-12 RX ORDER — SODIUM CHLORIDE 9 MG/ML
0.5 INJECTION INTRAMUSCULAR; INTRAVENOUS; SUBCUTANEOUS ONCE
Refills: 0 | Status: DISCONTINUED | OUTPATIENT
Start: 2022-10-12 | End: 2022-10-12

## 2022-10-12 RX ORDER — PIPERACILLIN AND TAZOBACTAM 4; .5 G/20ML; G/20ML
4.5 INJECTION, POWDER, LYOPHILIZED, FOR SOLUTION INTRAVENOUS EVERY 12 HOURS
Refills: 0 | Status: DISCONTINUED | OUTPATIENT
Start: 2022-10-12 | End: 2022-10-12

## 2022-10-12 RX ADMIN — HEPARIN SODIUM 5000 UNIT(S): 5000 INJECTION INTRAVENOUS; SUBCUTANEOUS at 21:39

## 2022-10-12 RX ADMIN — HEPARIN SODIUM 5000 UNIT(S): 5000 INJECTION INTRAVENOUS; SUBCUTANEOUS at 17:25

## 2022-10-12 RX ADMIN — Medication 1000 MILLIGRAM(S): at 06:13

## 2022-10-12 RX ADMIN — PIPERACILLIN AND TAZOBACTAM 200 GRAM(S): 4; .5 INJECTION, POWDER, LYOPHILIZED, FOR SOLUTION INTRAVENOUS at 06:48

## 2022-10-12 RX ADMIN — SODIUM CHLORIDE 1000 MILLILITER(S): 9 INJECTION INTRAMUSCULAR; INTRAVENOUS; SUBCUTANEOUS at 09:39

## 2022-10-12 RX ADMIN — Medication 25 GRAM(S): at 17:20

## 2022-10-12 RX ADMIN — Medication 300 MILLIGRAM(S): at 07:25

## 2022-10-12 RX ADMIN — AMLODIPINE BESYLATE 5 MILLIGRAM(S): 2.5 TABLET ORAL at 17:25

## 2022-10-12 RX ADMIN — PIPERACILLIN AND TAZOBACTAM 25 GRAM(S): 4; .5 INJECTION, POWDER, LYOPHILIZED, FOR SOLUTION INTRAVENOUS at 23:50

## 2022-10-12 RX ADMIN — PIPERACILLIN AND TAZOBACTAM 25 GRAM(S): 4; .5 INJECTION, POWDER, LYOPHILIZED, FOR SOLUTION INTRAVENOUS at 17:36

## 2022-10-12 RX ADMIN — Medication 10 MILLIGRAM(S): at 00:10

## 2022-10-12 RX ADMIN — Medication 25 MICROGRAM(S): at 17:24

## 2022-10-12 RX ADMIN — SODIUM CHLORIDE 150 MILLILITER(S): 9 INJECTION INTRAMUSCULAR; INTRAVENOUS; SUBCUTANEOUS at 10:21

## 2022-10-12 RX ADMIN — Medication 40 MILLIEQUIVALENT(S): at 07:26

## 2022-10-12 RX ADMIN — Medication 400 MILLIGRAM(S): at 05:58

## 2022-10-12 RX ADMIN — Medication 400 MILLIGRAM(S): at 21:59

## 2022-10-12 RX ADMIN — Medication 10 MILLIGRAM(S): at 06:47

## 2022-10-12 RX ADMIN — Medication 1000 MILLIGRAM(S): at 22:36

## 2022-10-12 NOTE — PROGRESS NOTE ADULT - SUBJECTIVE AND OBJECTIVE BOX
OVERNIGHT EVENTS: BP elevated 190s-200s, given 10mg hydral and later 10mg labetalol. Spiked temp 101, given tylenol and BCx drawn. Stat CXR showed ?LLL infiltrate. Started vanc & zosyn.    SUBJECTIVE / INTERVAL HPI: Patient seen and examined at bedside. Very lethargic. Minimal responses to questions.     VITAL SIGNS:  Vital Signs Last 24 Hrs  T(C): 36.6 (12 Oct 2022 10:00), Max: 38.3 (12 Oct 2022 05:23)  T(F): 97.9 (12 Oct 2022 10:00), Max: 101 (12 Oct 2022 05:23)  HR: 86 (12 Oct 2022 09:15) (86 - 122)  BP: 134/67 (12 Oct 2022 09:15) (134/67 - 203/112)  BP(mean): 93 (12 Oct 2022 09:15) (93 - 158)  RR: 42 (12 Oct 2022 09:15) (18 - 43)  SpO2: 94% (12 Oct 2022 09:15) (92% - 96%)    Parameters below as of 12 Oct 2022 09:15  Patient On (Oxygen Delivery Method): room air        PHYSICAL EXAM:    General: NAD  HEENT: NCAT  Neck: supple, trachea midline  Cardiovascular: S1, S2 normal; RRR, no M/G/R  Respiratory: CTABL; no W/R/R  Gastrointestinal: soft. improved mild tenderness diffusely, ND. no rebound or guarding.   Skin: no ulcerations or visible rashes appreciated  Extremities: WWP; no edema, clubbing or cyanosis  Vascular: 2+ DP pulses.   Neurological: AAOx1-2; lethargic, CN II-XII grossly intact; no focal deficits    MEDICATIONS:  MEDICATIONS  (STANDING):  amLODIPine   Tablet 5 milliGRAM(s) Oral daily  aspirin enteric coated 81 milliGRAM(s) Oral daily  atorvastatin 80 milliGRAM(s) Oral at bedtime  heparin   Injectable 5000 Unit(s) SubCutaneous every 8 hours  levothyroxine 25 MICROGram(s) Oral daily  magnesium sulfate  IVPB 4 Gram(s) IV Intermittent once  piperacillin/tazobactam IVPB.- 4.5 Gram(s) IV Intermittent once  sodium chloride 0.9%. 1000 milliLiter(s) (150 mL/Hr) IV Continuous <Continuous>    MEDICATIONS  (PRN):  acetaminophen     Tablet .. 650 milliGRAM(s) Oral every 6 hours PRN Temp greater or equal to 38C (100.4F), Mild Pain (1 - 3)  aluminum hydroxide/magnesium hydroxide/simethicone Suspension 30 milliLiter(s) Oral every 6 hours PRN Dyspepsia  melatonin 3 milliGRAM(s) Oral at bedtime PRN Insomnia  ondansetron Injectable 4 milliGRAM(s) IV Push every 6 hours PRN Nausea and/or Vomiting      ALLERGIES:  Allergies    No Known Allergies    Intolerances        LABS:                        15.4   12.87 )-----------( 227      ( 12 Oct 2022 06:18 )             43.6     10-12    143  |  112<H>  |  28<H>  ----------------------------<  111<H>  3.5   |  20<L>  |  4.11<H>    Ca    16.4<HH>      12 Oct 2022 06:18  Phos  3.9     10-12  Mg     1.5     10-12    TPro  6.1  /  Alb  3.1<L>  /  TBili  0.6  /  DBili  x   /  AST  13  /  ALT  10  /  AlkPhos  87  10-12        CAPILLARY BLOOD GLUCOSE          RADIOLOGY & ADDITIONAL TESTS: Reviewed.

## 2022-10-12 NOTE — PROGRESS NOTE ADULT - TIME BILLING
Patient seen and examined with house-staff during bedside rounds.  Resident note read, including vitals, physical findings, laboratory data, and radiological reports.   Revisions included below.  Direct personal management at bed side and extensive interpretation of the data.  Plan was outlined and discussed in details with the housestaff.  Decision making of high complexity  Action taken for acute disease activity to reflect the level of care provided:  - medication reconciliation  - review laboratory dataThe patient had fever earlier in the day and was started on antibiotic.  Cultures are pending.  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  Discussed the case with endocrine thoracic and ENT.  Hold Eliquis.  Plan for surgery.  Patient might need dialysis

## 2022-10-12 NOTE — PROGRESS NOTE ADULT - PROBLEM SELECTOR PLAN 1
Secondary to primary hyperparathyroidism  - Status post 4 doses of calcitonin on admission. Last dose on 10/10.  - Received pamidronate 60 mg IVP on 10/11.  - Sestamibi scan and thyroid US ordered. ENT following.   -   - Monitor for signs of fluid overload.  - Please monitor calcium every 12 hours for now.    Thank you for allowing us to participate in the care of Mr. Edmond.    Will continue to monitor.       Case discussed with Dr. Fox. Primary team updated. Secondary to primary hyperparathyroidism  - Status post 4 doses of calcitonin on admission. Last dose on 10/10.  - Received pamidronate 60 mg IVP on 10/11.  - Sestamibi scan and thyroid US ordered. ENT following.   - Please administer 1 liter of NS per hour for a total of three hours, followed by 200 mL/hr.  - Monitor for signs of fluid overload.  - Please monitor calcium every 12 hours for now.    Thank you for allowing us to participate in the care of Mr. Edmond.    Will continue to monitor.       Case discussed with Dr. Fox. Primary team updated.

## 2022-10-12 NOTE — PROGRESS NOTE ADULT - SUBJECTIVE AND OBJECTIVE BOX
INTERVAL HPI/OVERNIGHT EVENTS:    Patient was seen and examined this morning. Overnight, had fever of 101 with rigors, tachycardia and tachypnea. Started empirically on vanc/zosyn.  He received pamidronate 60mg yesterday, 10/11. Receiving NS @ 150ml/hr.   Today corrected calcium is 17.1. Creatinine 4.1.    Pt reports the following symptoms:    CONSTITUTIONAL:    EYES:  Negative  blurry vision or double vision  CARDIOVASCULAR:  Negative for chest pain or palpitations  RESPIRATORY:  Negative for cough, wheezing, or SOB   GASTROINTESTINAL:  Negative for nausea, vomiting, diarrhea, constipation, or abdominal pain  GENITOURINARY:  Negative frequency, urgency or dysuria  NEUROLOGIC:  No headache, confusion, dizziness, lightheadedness    MEDICATIONS  (STANDING):  amLODIPine   Tablet 5 milliGRAM(s) Oral daily  aspirin enteric coated 81 milliGRAM(s) Oral daily  atorvastatin 80 milliGRAM(s) Oral at bedtime  heparin   Injectable 5000 Unit(s) SubCutaneous every 8 hours  levothyroxine 25 MICROGram(s) Oral daily  magnesium sulfate  IVPB 4 Gram(s) IV Intermittent once  piperacillin/tazobactam IVPB.- 4.5 Gram(s) IV Intermittent once  sodium chloride 0.9%. 1000 milliLiter(s) (150 mL/Hr) IV Continuous <Continuous>    MEDICATIONS  (PRN):  acetaminophen     Tablet .. 650 milliGRAM(s) Oral every 6 hours PRN Temp greater or equal to 38C (100.4F), Mild Pain (1 - 3)  aluminum hydroxide/magnesium hydroxide/simethicone Suspension 30 milliLiter(s) Oral every 6 hours PRN Dyspepsia  melatonin 3 milliGRAM(s) Oral at bedtime PRN Insomnia  ondansetron Injectable 4 milliGRAM(s) IV Push every 6 hours PRN Nausea and/or Vomiting      PHYSICAL EXAM  Vital Signs Last 24 Hrs  T(C): 36.6 (12 Oct 2022 10:00), Max: 38.3 (12 Oct 2022 05:23)  T(F): 97.9 (12 Oct 2022 10:00), Max: 101 (12 Oct 2022 05:23)  HR: 86 (12 Oct 2022 09:15) (86 - 122)  BP: 134/67 (12 Oct 2022 09:15) (134/67 - 203/112)  BP(mean): 93 (12 Oct 2022 09:15) (93 - 158)  RR: 42 (12 Oct 2022 09:15) (18 - 43)  SpO2: 94% (12 Oct 2022 09:15) (92% - 96%)    Parameters below as of 12 Oct 2022 09:15  Patient On (Oxygen Delivery Method): room air    Constitutional: Awake, alert, obese male, confused, somnolent, with slurred speech, in no acute distress.   HEENT: Normocephalic, atraumatic, MADY.  Respiratory: Lungs clear to ausculation bilaterally.   Cardiovascular: regular rhythm, normal S1 and S2, no audible murmurs.   GI: soft, non-tender, non-distended, bowel sounds present.  Extremities: No lower extremity edema.  Psychiatric: AAO x 3.     LABS:                        15.4   12.87 )-----------( 227      ( 12 Oct 2022 06:18 )             43.6     10-12    143  |  112<H>  |  28<H>  ----------------------------<  111<H>  3.5   |  20<L>  |  4.11<H>    Ca    16.4<HH>      12 Oct 2022 06:18  Phos  3.9     10-12  Mg     1.5     10-12    TPro  6.1  /  Alb  3.1<L>  /  TBili  0.6  /  DBili  x   /  AST  13  /  ALT  10  /  AlkPhos  87  10-12

## 2022-10-12 NOTE — PROGRESS NOTE ADULT - ASSESSMENT
51 yo M w/ HTN, T2DM presented with nausea and vomiting. Admitted and managed for hypercalcemia. Nephrology consulted for ARPITA w/ sCr 3.34.    #Non-oliguric ARPITA  Pt with no known underlying CKD. Unknown BCr  Cr 3.34 on admission, uptrending, 4.11 today  UA with 100 protein and hematuria. Normal CK.  likely etiology volume depletion 2/2 to hypercalcemia, however FEUrea consistent with intrinsic etiology, unlikely iATN given no documented hypotensive episodes   No Hydronephrosis appreciated on abdominal sono      Plan:   Maintain net even to slightly positive fluid balance  Continue with IVF @250 cc/hr   Repeat UPCR  Hold RASSI, HCTZ   BID BMP   Monitor Urine out put   Strict Ins and outs   Switch Xeralto to Eliquis       #Severe Hypercalcemia   Likely due to Primary hyperparathyroidism    Plan:  s/p Pamidronate 60mg over 4 hours on 10/11 (Denosumab not available per pharmacy). Effect is usually seen after 2-4 days. Will not repeat a dose  Continue with IVF as above  Maintain net even to slightly positive fluid balance as Hypovolemia exacerbates hypercalcemia  Maintain the urine output at 100 to 150 mL/hour  BID BMP   Can also give lasix 40mg x1 once patient is volume repleted   Endocrinology on board; pt will need parathyroidectomy    Watson Fletcher M.D  PGY-4 Nephrology  [phone]   53 yo M w/ HTN, T2DM presented with nausea and vomiting. Admitted and managed for hypercalcemia. Nephrology consulted for ARPITA w/ sCr 3.34.    #Non-oliguric ARPITA  Pt with no known underlying CKD. Unknown BCr  Cr 3.34 on admission, uptrending, 4.11 today  UA with 100 protein and hematuria. Normal CK.  likely etiology volume depletion 2/2 to hypercalcemia, however FEUrea consistent with intrinsic etiology, unlikely iATN given no documented hypotensive episodes   No Hydronephrosis appreciated on abdominal sono      Plan:   Maintain net even to slightly positive fluid balance  Continue with IVF    Repeat UPCR  Hold RASSI, HCTZ   BID BMP   Monitor Urine out put   Strict Ins and outs   Switch Xeralto to Eliquis   If kidney function continues to worsen, pt may require dialysis in the next few days. This is communicated to the patient and family present at bedside and they showed understanding      #Severe Hypercalcemia   Likely due to Primary hyperparathyroidism    Plan:  s/p Pamidronate 60mg over 4 hours on 10/11 (Denosumab not available per pharmacy). Effect is usually seen after 2-4 days. Will not repeat a dose  Continue with IVF as per endo recs  Maintain net even to slightly positive fluid balance as Hypovolemia exacerbates hypercalcemia  Maintain the urine output at 100 to 150 mL/hour  BID BMP   Can also give lasix 40mg x1 if patient shows signs of vol overload   Endocrinology on board; pt will need parathyroidectomy    Watson Fletcher M.D  PGY-4 Nephrology  [phone]

## 2022-10-12 NOTE — CHART NOTE - NSCHARTNOTEFT_GEN_A_CORE
Overnight, patient noted to be tachycardic and hypertensive, while on fluids for hypercalcemia. He was tachycardic to 198/109 at 23:44, and was given hydral 10 PO. Repeat blood pressure 1 hour later was still elevated at 190/11 and he was given another po hydral 10mg. He was asymptomatic during this time. During the day prior his BPs ranged from 134-194/. He had good UOP w/ 1900cc output overnight. At 5:23 am, he had a fever to 101 degrees with rigors, BP of 185/131, HR of 122. He was given IV Tylenol and 2 sets of blood cultures were sent to the lab. We ordered a stat chest xray. For his BP he was given Labetalol 10mg IV push. We started Vanc and Zosyn for broad spectrum antibiotic coverage. Overnight, patient noted to be tachycardic and hypertensive, while on fluids for hypercalcemia. He was hypertensive to 198/109 w/ HR of 108 at 23:44, and was given hydral 10 PO. Repeat blood pressure 1 hour later was still elevated at 190/11 and he was given another po hydral 10mg. He was asymptomatic during this time. During the day prior his BPs ranged from 134-194/. He had good UOP w/ 1900cc output overnight. At 5:23 am, he had a fever to 101 degrees with rigors, BP of 185/131, HR of 122. He was given IV Tylenol and 2 sets of blood cultures were sent to the lab. We ordered a stat chest xray. For his BP he was given Labetalol 10mg IV push. We started Vanc and Zosyn for broad spectrum antibiotic coverage. Overnight, patient noted to be tachycardic and hypertensive, while on fluids for hypercalcemia. He was hypertensive to 198/109 w/ HR of 108 at 23:44, and was given hydral 10 PO. Repeat blood pressure 1 hour later was still elevated at 190/11 and he was given another po hydral 10mg. He was asymptomatic during this time. During the day prior his BPs ranged from 134-194/. He had good UOP w/ 1900cc output overnight. At 5:23 am, he had a fever to 101 degrees with rigors, BP of 185/131, HR of 122. He was given IV Tylenol and 2 sets of blood cultures were sent to the lab. We ordered a stat chest xray. For his BP he was given Labetalol 10mg IV push as diastolic remains above 120. We started Vanc and Zosyn for broad spectrum antibiotic coverage i/s/o malignancy. Overnight, patient noted to be tachycardic and hypertensive, while on fluids for hypercalcemia. He was hypertensive to 198/109 w/ HR of 108 at 23:44, and was given hydral 10 PO. Repeat blood pressure 1 hour later was still elevated at 190/11 and he was given another po hydral 10mg. He was asymptomatic during this time. During the day prior his BPs ranged from 134-194/. He had good UOP w/ 1900cc output overnight. At 5:23 am, he had a fever to 101 degrees with rigors, BP of 185/131, HR of 122. He was given IV Tylenol and 2 sets of blood cultures were sent to the lab. We ordered a stat chest xray. For his BP he was given Labetalol 10mg IV push as diastolic remains above 120. We started Vanc and Zosyn for broad spectrum antibiotic coverage i/s/o mass

## 2022-10-12 NOTE — PROGRESS NOTE ADULT - PROBLEM SELECTOR PLAN 5
Per HIE: History of prior strokes in 2018, 2019. Home meds Xarelto, ASA 81mg, Atorvastatin 80mg, & Gabapentin for tingling/numbness  - switched xarelto to eliquis 2.5mg BID, per Renal recs     - c/w eliquis 2.5mg BID, aspirin, and high intensity statin therapy  - hold Gabapentin iso ARPITA CT Chest w/o Contrast 10/11: Thick-walled cystic and/or necrotic mediastinal lesion measuring 3.5 x 2.4 x 3.2 described above, surrounding regional lymphadenopathy or significant fat planes stranding. DDx: bronchopulmonary malformation such as a bronchogenic and esophageal duplication cyst. Cystic degeneration of a malignant mediastinal lesion including lymphoma and necrotic lymphadenopathy.    - f/u Thyroid US & MRI for further characterization  - endocrine, ENT, and renal following. will f/u recs

## 2022-10-12 NOTE — PROGRESS NOTE ADULT - NUTRITIONAL ASSESSMENT
I:   Calcium improved to 16s. Pamidronate given. Per family, MS at baseline. Cr 4.11.   A: Hypercalcemia with ARPITA due to hyperparathyrodism, possible parathyroid carcinoma.   P: No indications for dialysis. Follow SCr. Continue IVF. Lasix with IVF PRN.

## 2022-10-12 NOTE — PROVIDER CONTACT NOTE (OTHER) - ACTION/TREATMENT ORDERED:
STAT EKG. Repeat temperature Rectal 101. IV Tylenol ordered and administered. Supplemental 02 administered.

## 2022-10-12 NOTE — PROVIDER CONTACT NOTE (OTHER) - ASSESSMENT
Pt has chills, shaking. Disoriented and not able to follow commands. Tachy up to 120s. BP remains in 180's. No neuro deficits. Oral temperature 99.5.

## 2022-10-12 NOTE — PROGRESS NOTE ADULT - SUBJECTIVE AND OBJECTIVE BOX
Patient is a 52y Male seen and evaluated at bedside. Overnight events noted. Pt got 60mg Pamidronate yesterday, Ca 16.4 this AM. Pt almost net even fluid balance.       Meds:    acetaminophen     Tablet .. 650 every 6 hours PRN  aluminum hydroxide/magnesium hydroxide/simethicone Suspension 30 every 6 hours PRN  amLODIPine   Tablet 5 daily  aspirin enteric coated 81 daily  atorvastatin 80 at bedtime  heparin   Injectable 5000 every 8 hours  levothyroxine 25 daily  magnesium sulfate  IVPB 4 once  melatonin 3 at bedtime PRN  ondansetron Injectable 4 every 6 hours PRN  piperacillin/tazobactam IVPB.- 4.5 once  sodium chloride 0.9%. 3000 <Continuous>  sodium chloride 0.9%. 5000 <Continuous>      T(C): , Max: 38.3 (10-12-22 @ 05:23)  T(F): , Max: 101 (10-12-22 @ 05:23)  HR: 99 (10-12-22 @ 07:23)  BP: 178/81 (10-12-22 @ 07:23)  BP(mean): 117 (10-12-22 @ 07:23)  RR: 43 (10-12-22 @ 07:23)  SpO2: 92% (10-12-22 @ 07:23)  Wt(kg): --    10-11 @ 07:01  -  10-12 @ 07:00  --------------------------------------------------------  IN: 3900 mL / OUT: 3950 mL / NET: -50 mL          Review of Systems:  ROS negative except as per HPI      PHYSICAL EXAM:  General: NAD, AAOX3  Cardiovascular: S1, S2 normal; RRR, no M/G/R  Respiratory: CTABL; no W/R/R  Gastrointestinal: soft. +mild, diffuse tenderness. ND. no rebound or guarding.   Extremities: WWP; no edema, clubbing or cyanosis  Neurological: AAOx3; No focal deficits noted      LABS:                        15.4   12.87 )-----------( 227      ( 12 Oct 2022 06:18 )             43.6     10-12    143  |  112<H>  |  28<H>  ----------------------------<  111<H>  3.5   |  20<L>  |  4.11<H>    Ca    16.4<HH>      12 Oct 2022 06:18  Phos  3.9     10-12  Mg     1.5     10-12    TPro  6.1  /  Alb  3.1<L>  /  TBili  0.6  /  DBili  x   /  AST  13  /  ALT  10  /  AlkPhos  87  10-12                RADIOLOGY & ADDITIONAL STUDIES:           Patient is a 52y Male seen and evaluated at bedside. Overnight events noted. Pt at his baseline mentation. Denies any active symptoms. Pt got 60mg Pamidronate yesterday, Ca 16.4 this AM. Pt almost net even fluid balance.       Meds:    acetaminophen     Tablet .. 650 every 6 hours PRN  aluminum hydroxide/magnesium hydroxide/simethicone Suspension 30 every 6 hours PRN  amLODIPine   Tablet 5 daily  aspirin enteric coated 81 daily  atorvastatin 80 at bedtime  heparin   Injectable 5000 every 8 hours  levothyroxine 25 daily  magnesium sulfate  IVPB 4 once  melatonin 3 at bedtime PRN  ondansetron Injectable 4 every 6 hours PRN  piperacillin/tazobactam IVPB.- 4.5 once  sodium chloride 0.9%. 3000 <Continuous>  sodium chloride 0.9%. 5000 <Continuous>      T(C): , Max: 38.3 (10-12-22 @ 05:23)  T(F): , Max: 101 (10-12-22 @ 05:23)  HR: 99 (10-12-22 @ 07:23)  BP: 178/81 (10-12-22 @ 07:23)  BP(mean): 117 (10-12-22 @ 07:23)  RR: 43 (10-12-22 @ 07:23)  SpO2: 92% (10-12-22 @ 07:23)  Wt(kg): --    10-11 @ 07:01  -  10-12 @ 07:00  --------------------------------------------------------  IN: 3900 mL / OUT: 3950 mL / NET: -50 mL          Review of Systems:  ROS negative except as per HPI      PHYSICAL EXAM:  General: NAD, AAOX3  Cardiovascular: S1, S2 normal; RRR, no M/G/R  Respiratory: CTABL; no W/R/R  Gastrointestinal: soft. +mild, diffuse tenderness. ND. no rebound or guarding.   Extremities: WWP; no edema, clubbing or cyanosis  Neurological: AAOx3; No focal deficits noted      LABS:                        15.4   12.87 )-----------( 227      ( 12 Oct 2022 06:18 )             43.6     10-12    143  |  112<H>  |  28<H>  ----------------------------<  111<H>  3.5   |  20<L>  |  4.11<H>    Ca    16.4<HH>      12 Oct 2022 06:18  Phos  3.9     10-12  Mg     1.5     10-12    TPro  6.1  /  Alb  3.1<L>  /  TBili  0.6  /  DBili  x   /  AST  13  /  ALT  10  /  AlkPhos  87  10-12                RADIOLOGY & ADDITIONAL STUDIES:

## 2022-10-12 NOTE — PROVIDER CONTACT NOTE (OTHER) - BACKGROUND
Hypercalcemic last known Calcium level 17.8. 3L NS Bolus' given. NS 200cc/hr maintenance fluids infusing since bolus' completed. Pt AOx3, previous CVA ~4weeks ago, Rt side weakness.

## 2022-10-12 NOTE — PROGRESS NOTE ADULT - PROBLEM SELECTOR PLAN 2
- BUN/Cr ratio: 25/3.34. Baseline cr 1.55 according to [facility] records  - FeUrea 100%, suggests intra-renal.  FeNa 5.2%, suggests post-renal, obstructive  - per Renal: Holding BP meds, & switched home xarelto to eliquis 2.5mg BID     Plan:  - f/u SPEP and UPEP, continue strict I/Os  - BMP q12h   - continue eliquis 2.5mg BID (discontinued xarelto)  - f/u further renal recs - pt acutely febrile to 101F  (10/11 overnight), given tylenol, blood cultures drawn  - started on vanc and zosyn for HAP coverage, given hospitalizations    Plan:   - f/u BCx, monitor leukocytosis (downtrending)  - f/u Vanc trough in AM, re-dose as needed  - continue zosyn 4.5g q12h  - f/u MRSA

## 2022-10-12 NOTE — PROGRESS NOTE ADULT - NS ATTEND AMEND GEN_ALL_CORE FT
THe patient's calcium remains very elevated to 17.1He had fever last night but not today. Sestimibi scan showed pickup in mediastinum but less bright than usual for parathyroid disease. I agree with surgical removal of the lesion in the mediastinum.FWe have ordered increased IV fluid-Normal Saline 1 liter every hour x3 then 200 cc/hour. If need be Lasix can be added. The patient August 2 had a calcium around 11 and creatinine minimally elevated.

## 2022-10-12 NOTE — PROGRESS NOTE ADULT - PROBLEM SELECTOR PLAN 4
Leukocytosis to 12.27 on admission. Patient presenting w/ 3-4 days of nausea and vomiting.     - WBC still elevated with neutrophilic predominance, no signs of acute infection  - given hypercalcemia, malignancy-associated hyperCa2+ & leukocytosis is possible  - continue to trend, f/u imaging - BUN/Cr ratio: 25/3.34. Baseline cr 1.55 according to [facility] records  - Cr uptrending, 4.11 today  - FeUrea 100%, suggests intra-renal.  FeNa 5.2%, suggests post-renal, obstructive. No hydronephrosis on renal US.   - per Renal: Holding BP meds     Plan:  - BMP q12h   - repeat UPCR  - f/u further renal recs

## 2022-10-12 NOTE — PROGRESS NOTE ADULT - PROBLEM SELECTOR PLAN 3
CT Chest w/o Contrast 10/11: Thick-walled cystic and/or necrotic mediastinal lesion measuring 3.5 x 2.4 x 3.2 described above, surrounding regional lymphadenopathy or significant fat planes stranding. DDx: bronchopulmonary malformation such as a bronchogenic and esophageal duplication cyst. Cystic degeneration of a malignant mediastinal lesion including lymphoma and necrotic lymphadenopathy.    - f/u Thyroid US & MRI for further characterization  - endocrine, ENT, and renal following. will f/u recs - pt frequently hypertensive to SBP 180s, given hydral 5mg intermittently, likely 2/2 IV fluids & holding pt's home BP meds  - 10/11 overnight: SBP reached 200s, was given 10 hydral --> SBP still elevated --> given 10 labetalol   - 10/12 AM: SBP decreased to 130s, with ~40% drop in MAP. Pt appeared lethargic and less responsive, possible 2/2 cerebral hypoperfusion (given discontinuation of IVF & acute drop in SBP following hydralazine/labetalol)    - continue to monitor

## 2022-10-12 NOTE — PROGRESS NOTE ADULT - ASSESSMENT
Mr. Edmond is a 52-years-old male with a past medical history of type 2 diabetes mellitus, hypertension, hyperlipidemia and  cerebrovascular accidents who presented to the emergency department complaining of nausea and multiple episodes of non-bloody non-bilious vomiting for the past ~4 days. Per family, he appeared to be more lethargic and "not himself" for the past few weeks. Labs were remarkable for hypercalcemia (17.1 mg/dL) and acute kidney injury.     Upon review of outpatient labs, patient has had hypercalcemia for more than 2 years, last level was 11 mg/dL (9/3/22 at [facility]). Per patient, he has been told that his calcium was high in the past, and that he might need surgery; however, further management of hypercalcemia was postponed due to stroke. PTH was 659 and Vitamin D-OH was 40.8. Hypercalcemia is likely to be secondary to primary hyperparathyroidism. Also found to have mediastinal mass.     A1C: 6.2%  Weight: 91 kg  BMI: 32.5  Creatinine: 3.89  GFR: 18

## 2022-10-12 NOTE — PROGRESS NOTE ADULT - PROBLEM SELECTOR PLAN 1
Likely 2/2 hyperparathyroidism   Patient presenting w/ calcium of 17.1. Last Ca 9/3 11 at [facility].   - s/p 1826u calcitonin over 2 days, s/p lasix 40mg IVP x1, on 250cc NS/hr  - , Vit D, 25 40.8, Vit D125: 27.5. Ionized Calcium 8.0  - 10/11 Ca: 17 despite calcitonin --> gave Pamidronate 60mg IVP     - renal & endocrine following, will f/u recs  - Continue q12h BMP, NS 250cc/hr  - Per renal, ok to give lasix 40mg x1 once patient volume repleted  - f/u serum PTH-related peptide & paraneoplastic autoantibody evaluation  - strict I and Os Likely 2/2 hyperparathyroidism   Patient presenting w/ calcium of 17.1. Last Ca 9/3 11 at [facility].   - s/p 1826u calcitonin over 2 days, s/p lasix 40mg IVP x1, on 150NS/hr  - , Vit D, 25 40.8, Vit D125: 27.5. Ionized Calcium 8.0  - 10/11 Ca: 17 despite calcitonin --> gave Pamidronate 60mg IVP x1    - Continue q12h BMP, on NS 150cc/hr, strict I/Os  - f/u Thyroid US, PET CT, & MRI thoracic spine  - Renal, Endo, ENT following. Per renal, ok to give lasix 40mg x1 once patient volume repleted  - f/u serum PTH-related peptide & paraneoplastic autoantibody evaluation

## 2022-10-13 DIAGNOSIS — Z91.89 OTHER SPECIFIED PERSONAL RISK FACTORS, NOT ELSEWHERE CLASSIFIED: ICD-10-CM

## 2022-10-13 PROBLEM — I63.9 CEREBRAL INFARCTION, UNSPECIFIED: Chronic | Status: ACTIVE | Noted: 2022-10-08

## 2022-10-13 PROBLEM — E11.9 TYPE 2 DIABETES MELLITUS WITHOUT COMPLICATIONS: Chronic | Status: ACTIVE | Noted: 2022-10-08

## 2022-10-13 PROBLEM — E78.5 HYPERLIPIDEMIA, UNSPECIFIED: Chronic | Status: ACTIVE | Noted: 2022-10-09

## 2022-10-13 PROBLEM — E03.9 HYPOTHYROIDISM, UNSPECIFIED: Chronic | Status: ACTIVE | Noted: 2022-10-09

## 2022-10-13 PROBLEM — I10 ESSENTIAL (PRIMARY) HYPERTENSION: Chronic | Status: ACTIVE | Noted: 2022-10-08

## 2022-10-13 LAB
% GAMMA, URINE: 9.6 % — SIGNIFICANT CHANGE UP
ALBUMIN 24H MFR UR ELPH: 60 % — SIGNIFICANT CHANGE UP
ALBUMIN SERPL ELPH-MCNC: 2.5 G/DL — LOW (ref 3.3–5)
ALP SERPL-CCNC: 71 U/L — SIGNIFICANT CHANGE UP (ref 40–120)
ALPHA1 GLOB 24H MFR UR ELPH: 11.3 % — SIGNIFICANT CHANGE UP
ALPHA2 GLOB 24H MFR UR ELPH: 7.1 % — SIGNIFICANT CHANGE UP
ALT FLD-CCNC: 12 U/L — SIGNIFICANT CHANGE UP (ref 10–45)
ANION GAP SERPL CALC-SCNC: 8 MMOL/L — SIGNIFICANT CHANGE UP (ref 5–17)
ANION GAP SERPL CALC-SCNC: 8 MMOL/L — SIGNIFICANT CHANGE UP (ref 5–17)
AST SERPL-CCNC: 20 U/L — SIGNIFICANT CHANGE UP (ref 10–40)
B-GLOBULIN 24H MFR UR ELPH: 12 % — SIGNIFICANT CHANGE UP
BASOPHILS # BLD AUTO: 0.05 K/UL — SIGNIFICANT CHANGE UP (ref 0–0.2)
BASOPHILS NFR BLD AUTO: 0.7 % — SIGNIFICANT CHANGE UP (ref 0–2)
BILIRUB SERPL-MCNC: 0.4 MG/DL — SIGNIFICANT CHANGE UP (ref 0.2–1.2)
BUN SERPL-MCNC: 28 MG/DL — HIGH (ref 7–23)
BUN SERPL-MCNC: 28 MG/DL — HIGH (ref 7–23)
CALCIUM SERPL-MCNC: 12.9 MG/DL — HIGH (ref 8.4–10.5)
CALCIUM SERPL-MCNC: 13.8 MG/DL — CRITICAL HIGH (ref 8.4–10.5)
CHLORIDE SERPL-SCNC: 112 MMOL/L — HIGH (ref 96–108)
CHLORIDE SERPL-SCNC: 114 MMOL/L — HIGH (ref 96–108)
CO2 SERPL-SCNC: 17 MMOL/L — LOW (ref 22–31)
CO2 SERPL-SCNC: 18 MMOL/L — LOW (ref 22–31)
CREAT SERPL-MCNC: 4.22 MG/DL — HIGH (ref 0.5–1.3)
CREAT SERPL-MCNC: 4.35 MG/DL — HIGH (ref 0.5–1.3)
EGFR: 16 ML/MIN/1.73M2 — LOW
EGFR: 16 ML/MIN/1.73M2 — LOW
EOSINOPHIL # BLD AUTO: 0.03 K/UL — SIGNIFICANT CHANGE UP (ref 0–0.5)
EOSINOPHIL NFR BLD AUTO: 0.4 % — SIGNIFICANT CHANGE UP (ref 0–6)
GLUCOSE SERPL-MCNC: 124 MG/DL — HIGH (ref 70–99)
GLUCOSE SERPL-MCNC: 130 MG/DL — HIGH (ref 70–99)
HCT VFR BLD CALC: 39.1 % — SIGNIFICANT CHANGE UP (ref 39–50)
HGB BLD-MCNC: 13.4 G/DL — SIGNIFICANT CHANGE UP (ref 13–17)
IMM GRANULOCYTES NFR BLD AUTO: 0.3 % — SIGNIFICANT CHANGE UP (ref 0–0.9)
INTERPRETATION 24H UR IFE-IMP: SIGNIFICANT CHANGE UP
INTERPRETATION 24H UR IFE-IMP: SIGNIFICANT CHANGE UP
LYMPHOCYTES # BLD AUTO: 0.6 K/UL — LOW (ref 1–3.3)
LYMPHOCYTES # BLD AUTO: 8.2 % — LOW (ref 13–44)
M PROTEIN 24H UR ELPH-MRATE: SIGNIFICANT CHANGE UP
MAGNESIUM SERPL-MCNC: 2.3 MG/DL — SIGNIFICANT CHANGE UP (ref 1.6–2.6)
MCHC RBC-ENTMCNC: 29.8 PG — SIGNIFICANT CHANGE UP (ref 27–34)
MCHC RBC-ENTMCNC: 34.3 GM/DL — SIGNIFICANT CHANGE UP (ref 32–36)
MCV RBC AUTO: 87.1 FL — SIGNIFICANT CHANGE UP (ref 80–100)
MONOCYTES # BLD AUTO: 0.78 K/UL — SIGNIFICANT CHANGE UP (ref 0–0.9)
MONOCYTES NFR BLD AUTO: 10.7 % — SIGNIFICANT CHANGE UP (ref 2–14)
NEUTROPHILS # BLD AUTO: 5.84 K/UL — SIGNIFICANT CHANGE UP (ref 1.8–7.4)
NEUTROPHILS NFR BLD AUTO: 79.7 % — HIGH (ref 43–77)
NRBC # BLD: 0 /100 WBCS — SIGNIFICANT CHANGE UP (ref 0–0)
PHOSPHATE SERPL-MCNC: 3.2 MG/DL — SIGNIFICANT CHANGE UP (ref 2.5–4.5)
PLATELET # BLD AUTO: 200 K/UL — SIGNIFICANT CHANGE UP (ref 150–400)
POTASSIUM SERPL-MCNC: 3.4 MMOL/L — LOW (ref 3.5–5.3)
POTASSIUM SERPL-MCNC: 3.9 MMOL/L — SIGNIFICANT CHANGE UP (ref 3.5–5.3)
POTASSIUM SERPL-SCNC: 3.4 MMOL/L — LOW (ref 3.5–5.3)
POTASSIUM SERPL-SCNC: 3.9 MMOL/L — SIGNIFICANT CHANGE UP (ref 3.5–5.3)
PROT ?TM UR-MCNC: 171 MG/DL — HIGH (ref 0–12)
PROT PATTERN 24H UR ELPH-IMP: SIGNIFICANT CHANGE UP
PROT SERPL-MCNC: 5.2 G/DL — LOW (ref 6–8.3)
RBC # BLD: 4.49 M/UL — SIGNIFICANT CHANGE UP (ref 4.2–5.8)
RBC # FLD: 13.6 % — SIGNIFICANT CHANGE UP (ref 10.3–14.5)
SARS-COV-2 RNA SPEC QL NAA+PROBE: SIGNIFICANT CHANGE UP
SODIUM SERPL-SCNC: 137 MMOL/L — SIGNIFICANT CHANGE UP (ref 135–145)
SODIUM SERPL-SCNC: 140 MMOL/L — SIGNIFICANT CHANGE UP (ref 135–145)
VANCOMYCIN TROUGH SERPL-MCNC: 14.8 UG/ML — SIGNIFICANT CHANGE UP (ref 10–20)
WBC # BLD: 7.32 K/UL — SIGNIFICANT CHANGE UP (ref 3.8–10.5)
WBC # FLD AUTO: 7.32 K/UL — SIGNIFICANT CHANGE UP (ref 3.8–10.5)

## 2022-10-13 PROCEDURE — 99233 SBSQ HOSP IP/OBS HIGH 50: CPT | Mod: GC

## 2022-10-13 PROCEDURE — 99221 1ST HOSP IP/OBS SF/LOW 40: CPT

## 2022-10-13 PROCEDURE — 99222 1ST HOSP IP/OBS MODERATE 55: CPT

## 2022-10-13 PROCEDURE — 99232 SBSQ HOSP IP/OBS MODERATE 35: CPT | Mod: GC

## 2022-10-13 RX ORDER — VANCOMYCIN HCL 1 G
1500 VIAL (EA) INTRAVENOUS ONCE
Refills: 0 | Status: COMPLETED | OUTPATIENT
Start: 2022-10-13 | End: 2022-10-13

## 2022-10-13 RX ORDER — HEPARIN SODIUM 5000 [USP'U]/ML
5000 INJECTION INTRAVENOUS; SUBCUTANEOUS EVERY 8 HOURS
Refills: 0 | Status: DISCONTINUED | OUTPATIENT
Start: 2022-10-13 | End: 2022-10-13

## 2022-10-13 RX ORDER — LEVOTHYROXINE SODIUM 125 MCG
20 TABLET ORAL
Refills: 0 | Status: DISCONTINUED | OUTPATIENT
Start: 2022-10-13 | End: 2022-10-15

## 2022-10-13 RX ORDER — SODIUM CHLORIDE 9 MG/ML
1000 INJECTION INTRAMUSCULAR; INTRAVENOUS; SUBCUTANEOUS
Refills: 0 | Status: DISCONTINUED | OUTPATIENT
Start: 2022-10-13 | End: 2022-10-14

## 2022-10-13 RX ORDER — HEPARIN SODIUM 5000 [USP'U]/ML
5000 INJECTION INTRAVENOUS; SUBCUTANEOUS EVERY 8 HOURS
Refills: 0 | Status: DISCONTINUED | OUTPATIENT
Start: 2022-10-14 | End: 2022-10-15

## 2022-10-13 RX ORDER — POTASSIUM CHLORIDE 20 MEQ
40 PACKET (EA) ORAL ONCE
Refills: 0 | Status: COMPLETED | OUTPATIENT
Start: 2022-10-13 | End: 2022-10-13

## 2022-10-13 RX ORDER — ACETAMINOPHEN 500 MG
1000 TABLET ORAL ONCE
Refills: 0 | Status: COMPLETED | OUTPATIENT
Start: 2022-10-13 | End: 2022-10-13

## 2022-10-13 RX ADMIN — SODIUM CHLORIDE 200 MILLILITER(S): 9 INJECTION INTRAMUSCULAR; INTRAVENOUS; SUBCUTANEOUS at 07:43

## 2022-10-13 RX ADMIN — Medication 400 MILLIGRAM(S): at 06:40

## 2022-10-13 RX ADMIN — Medication 1000 MILLIGRAM(S): at 07:14

## 2022-10-13 RX ADMIN — PIPERACILLIN AND TAZOBACTAM 25 GRAM(S): 4; .5 INJECTION, POWDER, LYOPHILIZED, FOR SOLUTION INTRAVENOUS at 12:02

## 2022-10-13 RX ADMIN — Medication 300 MILLIGRAM(S): at 10:12

## 2022-10-13 RX ADMIN — Medication 40 MILLIEQUIVALENT(S): at 16:05

## 2022-10-13 RX ADMIN — Medication 20 MICROGRAM(S): at 07:14

## 2022-10-13 RX ADMIN — ATORVASTATIN CALCIUM 80 MILLIGRAM(S): 80 TABLET, FILM COATED ORAL at 21:29

## 2022-10-13 RX ADMIN — HEPARIN SODIUM 5000 UNIT(S): 5000 INJECTION INTRAVENOUS; SUBCUTANEOUS at 16:05

## 2022-10-13 RX ADMIN — HEPARIN SODIUM 5000 UNIT(S): 5000 INJECTION INTRAVENOUS; SUBCUTANEOUS at 07:14

## 2022-10-13 NOTE — CONSULT NOTE ADULT - NS ATTEND AMEND GEN_ALL_CORE FT
Patient seen and examined, discussed with team. Agree with above with following additions:    Patient admitted with symptomatic hypercalcemia and elevated PTH. He has a lesion anterior to the trachea, below the aortic arch and above the bifurcation of the main PA in the middle mediastinum. Concerns for PTH adenoma or carcinoma. Consulted for resection. Patient has recent history of stroke on anticoagulation. Anticoagulation was held on 10/12.     After review of the imaging, patient will need a median sternotomy and possible cardiopulmonary bypass for resection.   Prior to resection, please consult the Neurology Stroke Service for appropriate work up and risk assessment prior to possible CPB.   Please continue to hold AC and if AC is indicated, please start heparin drip.   Please obtain physical therapy consult to assess mobility.  Continue other supportive care.    We will continue to follow closely and determine optimal timing for resection.     Chris Ugalde MD  Thoracic Surgery

## 2022-10-13 NOTE — CONSULT NOTE ADULT - SUBJECTIVE AND OBJECTIVE BOX
Surgeon: Dr. Ugalde    Requesting Physician: Dr. Medina    HISTORY OF PRESENT ILLNESS (Need 4):  This is a 51 y/o male w/ PMHx of HTN, T2DM, HLD, hypothyroidism, CVA (most recently admitted ~4w ago, admitted at [facility], R sided weakness, slurred speech) presents here with 3-4d of multiple NBNB nausea and vomiting. In the ED, he was found to have a calcium of 17.1. He says he hasn't been able to keep down any liquids/food.  Also, per the son over the past few weeks he has seemed more lethargic and not himself.  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  He takes Lisinopril-HCTZ daily for HTN.  He denies recent tums or calcium supplementation.  Most recent Calcium 9/3/22 at [facility] was 11.  No other systemic symptoms. Denies HA, confusion, fever/chills, cough, sore throat, SOB, CP, abd pain, urinary symptoms, new weakness/numbness, black/bloody stool, rashes.  Since admission, found to have ARPITA and a 3.2cm nodule at the aortopulmonary region suggestive of an ectopic parathyroid adenoma within the mediastinum. Diffuse uptake in the thyroid gland.  We were consulted for surgical evaluation.  Currently,     PAST MEDICAL & SURGICAL HISTORY:  Stroke      HTN (hypertension)      Diabetes      Hypothyroidism      Hyperlipidemia        MEDICATIONS  (STANDING):  amLODIPine   Tablet 5 milliGRAM(s) Oral daily  atorvastatin 80 milliGRAM(s) Oral at bedtime  heparin   Injectable 5000 Unit(s) SubCutaneous every 8 hours  levothyroxine Injectable 20 MICROGram(s) IV Push <User Schedule>  piperacillin/tazobactam IVPB.. 4.5 Gram(s) IV Intermittent every 12 hours  sodium chloride 0.9%. 1000 milliLiter(s) (200 mL/Hr) IV Continuous <Continuous>    MEDICATIONS  (PRN):  acetaminophen     Tablet .. 975 milliGRAM(s) Oral every 6 hours PRN Temp greater or equal to 38C (100.4F), Mild Pain (1 - 3), Moderate Pain (4 - 6)  aluminum hydroxide/magnesium hydroxide/simethicone Suspension 30 milliLiter(s) Oral every 6 hours PRN Dyspepsia  melatonin 3 milliGRAM(s) Oral at bedtime PRN Insomnia  ondansetron Injectable 4 milliGRAM(s) IV Push every 6 hours PRN Nausea and/or Vomiting      Allergies    No Known Allergies    Intolerances        SOCIAL HISTORY:  Smoker:  YES / NO        PACK YEARS:                         WHEN QUIT?  ETOH use:  YES / NO               FREQUENCY / QUANTITY:  Ilicit Drug use:  YES / NO  Occupation:  Assisted device use (Cane / Walker):  Live with:     FAMILY HISTORY: Non-contributory      Review of Systems (Need 10):  CONSTITUTIONAL: Denies fevers / chills, sweats, fatigue, weight loss, weight gain                                       NEURO:  Denies parathesias, seizures, syncope, confusion                                                                                  EYES:  Denies blurry vision, discharge, pain, loss of vision                                                                                    ENMT:  Denies difficulty hearing, vertigo, dysphagia, epistaxis, recent dental work                                       CV:  Denies chest pain, palpitations, NUNO, orthopnea                                                                                           RESPIRATORY:  Denies wWheezing, SOB, cough / sputum, hemoptysis                                                               GI:  +N/V.  Denies diarrhea, constipation, melena                                                                          : Denies hematuria, dysuria, urgency, incontinence                                                                                          MUSKULOSKELETAL:  Denies arthritis, joint swelling, muscle weakness                                                             SKIN/BREAST:  Denies rash, itching, hair loss, masses                                                                                              PSYCH:  Denies depression, anxiety, suicidal ideation                                                                                                HEME/LYMPH:  Denies bruises easily, enlarged lymph nodes, tender lymph nodes                                          ENDOCRINE:  Denies cold intolerance, heat intolerance, polydipsia                                                                      Vital Signs Last 24 Hrs  T(C): 36.9 (13 Oct 2022 14:34), Max: 38.5 (12 Oct 2022 20:32)  T(F): 98.5 (13 Oct 2022 14:34), Max: 101.3 (12 Oct 2022 20:32)  HR: 78 (13 Oct 2022 12:27) (78 - 101)  BP: 162/94 (13 Oct 2022 12:27) (140/75 - 177/91)  BP(mean): 122 (13 Oct 2022 12:27) (102 - 126)  RR: 30 (13 Oct 2022 12:27) (26 - 48)  SpO2: 95% (13 Oct 2022 12:27) (90% - 95%)    Parameters below as of 13 Oct 2022 12:27  Patient On (Oxygen Delivery Method): room air        Physical Exam (Need 8)  GEN: NAD, looks comfortable  Psych: Mood appropriate  Neuro: A&Ox3.  No focal deficits.  Moving all extremities.   HEENT: No obvious abnormalities  CV: S1S2, regular, no murmurs appreciated.  No carotid bruits.  No JVD  Lungs: Clear B/L.  No wheezing, rales or rhonchi  ABD: Soft, non-tender, non-distended.  +Bowel sounds  EXT: Warm and well perfused.  No peripheral edema noted  Musculoskeletal: Moving all extremities with normal ROM, no joint swelling  PV: Pedal pulses palpable      LABS:                        13.4   7.32  )-----------( 200      ( 13 Oct 2022 06:19 )             39.1     10-13    140  |  114<H>  |  28<H>  ----------------------------<  124<H>  3.4<L>   |  18<L>  |  4.35<H>    Ca    13.8<HH>      13 Oct 2022 06:19  Phos  3.2     10-13  Mg     2.3     10-13    TPro  5.2<L>  /  Alb  2.5<L>  /  TBili  0.4  /  DBili  x   /  AST  20  /  ALT  12  /  AlkPhos  71  10-13          RADIOLOGY & ADDITIONAL STUDIES:  Thyroid U/S < from: US Thyroid (10.12.22 @ 17:10) >  ISTHMUS:  Dimensions: 0.3 cm AP  The isthmus lobe contains no visible nodules.    Cervical Lymph Node Evaluation: A normal-sized and normal-appearing lymph   node is present in the left lower neck. It measures 1.1 x 0.8 x 0.9 cm,   has an echogenic hilum, and is avascular on Doppler imaging.    Parathyroid Examination: Parathyroid glands not visualized.      IMPRESSION:  No parathyroid adenoma is seen.    < end of copied text >      CXR: < from: Xray Chest 1 View- PORTABLE-Urgent (Xray Chest 1 View- PORTABLE-Urgent .) (10.12.22 @ 07:18) >  HISTORY: Fever    IMPRESSION:    Limited examination secondary to rotation/patient positioning. Cannot  exclude left lower lung infiltrates on this exam. No left upper lung or   right lung infiltrate or consolidation. No pleural effusion. No   pneumothorax.    < end of copied text >      CT Scan: < from: CT Chest No Cont (10.11.22 @ 09:50) >  IMPRESSION:    1. Thick-walled cystic and/or necrotic mediastinal lesion measuring 3.5 x   2.4 x 3.2 described above. There is surrounding regional lymphadenopathy   or significant fat planes stranding. Abbreviated differential includes a   bronchopulmonary malformation such as a bronchogenic and esophageal   duplication cyst. Cystic degeneration of a malignant mediastinal lesion  including lymphoma and necrotic lymphadenopathy. Further characterization   with a thoracic MRI as well as whole-body PET/CT and ultimately   histological sampling.  2. No surrounding lymphadenopathy or fat plane stranding.  3. Shaped-like endplates involving the thoracic spine. Wrist may be   degenerative. Possibility of sickle cell disease cannot be excluded.    < end of copied text >  < from: NM Parathyroid Imaging w/Spect CT (10.12.22 @ 15:45) >      IMPRESSION:  1. Focal uptake corresponding to a 3.2 cm nodule at the aortopulmonary   region suggestive of an ectopic parathyroid adenoma within the   mediastinum. Uptake is somewhat lower than expected, although the dose of   Cardiolite given was lower than planned due to delays in patient   transport.  2. Diffuse uptake in the thyroid gland. Subtle parathyroid hyperplasia or   adenomas at the thyroid cannot be excluded.    --- End of Report ---    < end of copied text >    EKG:    < from: TTE Echo Complete w/ Contrast w/ Doppler (10.11.22 @ 14:32) >  CONCLUSIONS:     1. Mild symmetric left ventricular hypertrophy.   2. Hyperdynamic left ventricular systolic function.   3. Grade I left ventricular diastolic dysfunction.   4. Normal right ventricular size and systolic function.   5. No significant valvular disease.   6. No evidence of pulmonary hypertension.   7. No pericardial effusion.    < end of copied text >      Cardiac Cath: Not done Surgeon: Dr. Ugalde    Requesting Physician: Dr. Medina    HISTORY OF PRESENT ILLNESS (Need 4):  This is a 51 y/o male w/ PMHx of HTN, T2DM, HLD, hypothyroidism, CVA (most recently admitted ~4w ago, admitted at [facility], R sided weakness, slurred speech) presents here with 3-4d of multiple NBNB nausea and vomiting. In the ED, he was found to have a calcium of 17.1. He says he hasn't been able to keep down any liquids/food.  Also, per the son over the past few weeks he has seemed more lethargic and not himself.  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  He takes Lisinopril-HCTZ daily for HTN.  He denies recent tums or calcium supplementation.  Most recent Calcium 9/3/22 at [facility] was 11.  No other systemic symptoms. Denies HA, confusion, fever/chills, cough, sore throat, SOB, CP, abd pain, urinary symptoms, new weakness/numbness, black/bloody stool, rashes.  Since admission, found to have calcium level of 17.1 PET CT scan revealed 3.2 cm nodule suggestive of an ectopic parathyroid adenoma within mediastinum. Subtle parathyroid hyperplasia or adenomas at the thyroid cannot be excluded. Thoracic surgery consulted for evaluation.     PAST MEDICAL & SURGICAL HISTORY:  Stroke      HTN (hypertension)      Diabetes      Hypothyroidism      Hyperlipidemia    MEDICATIONS  (STANDING):  amLODIPine   Tablet 5 milliGRAM(s) Oral daily  atorvastatin 80 milliGRAM(s) Oral at bedtime  heparin   Injectable 5000 Unit(s) SubCutaneous every 8 hours  levothyroxine Injectable 20 MICROGram(s) IV Push <User Schedule>  piperacillin/tazobactam IVPB.. 4.5 Gram(s) IV Intermittent every 12 hours  sodium chloride 0.9%. 1000 milliLiter(s) (200 mL/Hr) IV Continuous <Continuous>    MEDICATIONS  (PRN):  acetaminophen     Tablet .. 975 milliGRAM(s) Oral every 6 hours PRN Temp greater or equal to 38C (100.4F), Mild Pain (1 - 3), Moderate Pain (4 - 6)  aluminum hydroxide/magnesium hydroxide/simethicone Suspension 30 milliLiter(s) Oral every 6 hours PRN Dyspepsia  melatonin 3 milliGRAM(s) Oral at bedtime PRN Insomnia  ondansetron Injectable 4 milliGRAM(s) IV Push every 6 hours PRN Nausea and/or Vomiting      Allergies    No Known Allergies    Intolerances        SOCIAL HISTORY:  Smoker:  YES / NO        PACK YEARS:                         WHEN QUIT?  ETOH use:  YES / NO               FREQUENCY / QUANTITY:  Ilicit Drug use:  YES / NO  Occupation:  Assisted device use (Cane / Walker):  Live with:     FAMILY HISTORY: Non-contributory      Review of Systems (Need 10):  CONSTITUTIONAL: Denies fevers / chills, sweats, fatigue, weight loss, weight gain                                       NEURO:  Denies parathesias, seizures, syncope, confusion                                                                                  EYES:  Denies blurry vision, discharge, pain, loss of vision                                                                                    ENMT:  Denies difficulty hearing, vertigo, dysphagia, epistaxis, recent dental work                                       CV:  Denies chest pain, palpitations, NUNO, orthopnea                                                                                           RESPIRATORY:  Denies wWheezing, SOB, cough / sputum, hemoptysis                                                               GI:  +N/V.  Denies diarrhea, constipation, melena                                                                          : Denies hematuria, dysuria, urgency, incontinence                                                                                          MUSKULOSKELETAL:  Denies arthritis, joint swelling, muscle weakness                                                             SKIN/BREAST:  Denies rash, itching, hair loss, masses                                                                                              PSYCH:  Denies depression, anxiety, suicidal ideation                                                                                                HEME/LYMPH:  Denies bruises easily, enlarged lymph nodes, tender lymph nodes                                          ENDOCRINE:  Denies cold intolerance, heat intolerance, polydipsia                                                                      Vital Signs Last 24 Hrs  T(C): 36.9 (13 Oct 2022 14:34), Max: 38.5 (12 Oct 2022 20:32)  T(F): 98.5 (13 Oct 2022 14:34), Max: 101.3 (12 Oct 2022 20:32)  HR: 78 (13 Oct 2022 12:27) (78 - 101)  BP: 162/94 (13 Oct 2022 12:27) (140/75 - 177/91)  BP(mean): 122 (13 Oct 2022 12:27) (102 - 126)  RR: 30 (13 Oct 2022 12:27) (26 - 48)  SpO2: 95% (13 Oct 2022 12:27) (90% - 95%)    Parameters below as of 13 Oct 2022 12:27  Patient On (Oxygen Delivery Method): room air        Physical Exam (Need 8)  GEN: NAD, looks comfortable  Psych: Mood appropriate  Neuro: A&Ox3.  No focal deficits.  Moving all extremities.   HEENT: No obvious abnormalities  CV: S1S2, regular, no murmurs appreciated.  No carotid bruits.  No JVD  Lungs: Clear B/L.  No wheezing, rales or rhonchi  ABD: Soft, non-tender, non-distended.  +Bowel sounds  EXT: Warm and well perfused.  No peripheral edema noted  Musculoskeletal: Moving all extremities with normal ROM, no joint swelling  PV: Pedal pulses palpable      LABS:                        13.4   7.32  )-----------( 200      ( 13 Oct 2022 06:19 )             39.1     10-13    140  |  114<H>  |  28<H>  ----------------------------<  124<H>  3.4<L>   |  18<L>  |  4.35<H>    Ca    13.8<HH>      13 Oct 2022 06:19  Phos  3.2     10-13  Mg     2.3     10-13    TPro  5.2<L>  /  Alb  2.5<L>  /  TBili  0.4  /  DBili  x   /  AST  20  /  ALT  12  /  AlkPhos  71  10-13          RADIOLOGY & ADDITIONAL STUDIES:  Thyroid U/S < from: US Thyroid (10.12.22 @ 17:10) >  ISTHMUS:  Dimensions: 0.3 cm AP  The isthmus lobe contains no visible nodules.    Cervical Lymph Node Evaluation: A normal-sized and normal-appearing lymph   node is present in the left lower neck. It measures 1.1 x 0.8 x 0.9 cm,   has an echogenic hilum, and is avascular on Doppler imaging.    Parathyroid Examination: Parathyroid glands not visualized.      IMPRESSION:  No parathyroid adenoma is seen.    < end of copied text >      CXR: < from: Xray Chest 1 View- PORTABLE-Urgent (Xray Chest 1 View- PORTABLE-Urgent .) (10.12.22 @ 07:18) >  HISTORY: Fever    IMPRESSION:    Limited examination secondary to rotation/patient positioning. Cannot  exclude left lower lung infiltrates on this exam. No left upper lung or   right lung infiltrate or consolidation. No pleural effusion. No   pneumothorax.    < end of copied text >      CT Scan: < from: CT Chest No Cont (10.11.22 @ 09:50) >  IMPRESSION:    1. Thick-walled cystic and/or necrotic mediastinal lesion measuring 3.5 x   2.4 x 3.2 described above. There is surrounding regional lymphadenopathy   or significant fat planes stranding. Abbreviated differential includes a   bronchopulmonary malformation such as a bronchogenic and esophageal   duplication cyst. Cystic degeneration of a malignant mediastinal lesion  including lymphoma and necrotic lymphadenopathy. Further characterization   with a thoracic MRI as well as whole-body PET/CT and ultimately   histological sampling.  2. No surrounding lymphadenopathy or fat plane stranding.  3. Shaped-like endplates involving the thoracic spine. Wrist may be   degenerative. Possibility of sickle cell disease cannot be excluded.    < end of copied text >  < from: NM Parathyroid Imaging w/Spect CT (10.12.22 @ 15:45) >      IMPRESSION:  1. Focal uptake corresponding to a 3.2 cm nodule at the aortopulmonary   region suggestive of an ectopic parathyroid adenoma within the   mediastinum. Uptake is somewhat lower than expected, although the dose of   Cardiolite given was lower than planned due to delays in patient   transport.  2. Diffuse uptake in the thyroid gland. Subtle parathyroid hyperplasia or   adenomas at the thyroid cannot be excluded.    --- End of Report ---    < end of copied text >    EKG:    < from: TTE Echo Complete w/ Contrast w/ Doppler (10.11.22 @ 14:32) >  CONCLUSIONS:     1. Mild symmetric left ventricular hypertrophy.   2. Hyperdynamic left ventricular systolic function.   3. Grade I left ventricular diastolic dysfunction.   4. Normal right ventricular size and systolic function.   5. No significant valvular disease.   6. No evidence of pulmonary hypertension.   7. No pericardial effusion.    < end of copied text >      Cardiac Cath: Not done Surgeon: Dr. Ugalde    Requesting Physician: Dr. Medina    HISTORY OF PRESENT ILLNESS (Need 4):  This is a 53 y/o male w/ PMHx of HTN, T2DM, HLD, hypothyroidism, CVA (most recently admitted ~4w ago, admitted at [facility], R sided weakness, slurred speech) presents here with 3-4d of multiple NBNB nausea and vomiting. In the ED, he was found to have a calcium of 17.1. He says he hasn't been able to keep down any liquids/food.  Also, per the son over the past few weeks he has seemed more lethargic and not himself.  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  He takes Lisinopril-HCTZ daily for HTN.  He denies recent tums or calcium supplementation.  Most recent Calcium 9/3/22 at [facility] was 11.  No other systemic symptoms. Denies HA, confusion, fever/chills, cough, sore throat, SOB, CP, abd pain, urinary symptoms, new weakness/numbness, black/bloody stool, rashes.  Since admission, found to have calcium level of 17.1 PET CT scan revealed 3.2 cm nodule suggestive of an ectopic parathyroid adenoma within mediastinum. Subtle parathyroid hyperplasia or adenomas at the thyroid cannot be excluded. Thoracic surgery consulted for evaluation.     PAST MEDICAL & SURGICAL HISTORY:  Stroke      HTN (hypertension)      Diabetes      Hypothyroidism      Hyperlipidemia    MEDICATIONS  (STANDING):  amLODIPine   Tablet 5 milliGRAM(s) Oral daily  atorvastatin 80 milliGRAM(s) Oral at bedtime  heparin   Injectable 5000 Unit(s) SubCutaneous every 8 hours  levothyroxine Injectable 20 MICROGram(s) IV Push <User Schedule>  piperacillin/tazobactam IVPB.. 4.5 Gram(s) IV Intermittent every 12 hours  sodium chloride 0.9%. 1000 milliLiter(s) (200 mL/Hr) IV Continuous <Continuous>    MEDICATIONS  (PRN):  acetaminophen     Tablet .. 975 milliGRAM(s) Oral every 6 hours PRN Temp greater or equal to 38C (100.4F), Mild Pain (1 - 3), Moderate Pain (4 - 6)  aluminum hydroxide/magnesium hydroxide/simethicone Suspension 30 milliLiter(s) Oral every 6 hours PRN Dyspepsia  melatonin 3 milliGRAM(s) Oral at bedtime PRN Insomnia  ondansetron Injectable 4 milliGRAM(s) IV Push every 6 hours PRN Nausea and/or Vomiting      Allergies    No Known Allergies    Intolerances        SOCIAL HISTORY:  Smoker: NO   ETOH use:  NO       Ilicit Drug use:  NO      FAMILY HISTORY: Non-contributory    Review of Systems:  CONSTITUTIONAL: Denies fevers / chills, sweats, fatigue, weight loss, weight gain                                       NEURO:  Denies parathesias, seizures, syncope, confusion                                                                                  EYES:  Denies blurry vision, discharge, pain, loss of vision                                                                                    ENMT:  Denies difficulty hearing, vertigo, dysphagia, epistaxis, recent dental work                                       CV:  Denies chest pain, palpitations, NUNO, orthopnea                                                                                           RESPIRATORY:  Denies wWheezing, SOB, cough / sputum, hemoptysis                                                               GI:  +N/V.  Denies diarrhea, constipation, melena                                                                          : Denies hematuria, dysuria, urgency, incontinence                                                                                          MUSKULOSKELETAL:  Denies arthritis, joint swelling, muscle weakness                                                             SKIN/BREAST:  Denies rash, itching, hair loss, masses                                                                                              PSYCH:  Denies depression, anxiety, suicidal ideation                                                                                                HEME/LYMPH:  Denies bruises easily, enlarged lymph nodes, tender lymph nodes                                          ENDOCRINE:  Denies cold intolerance, heat intolerance, polydipsia                                                                      Vital Signs Last 24 Hrs  T(C): 36.9 (13 Oct 2022 14:34), Max: 38.5 (12 Oct 2022 20:32)  T(F): 98.5 (13 Oct 2022 14:34), Max: 101.3 (12 Oct 2022 20:32)  HR: 78 (13 Oct 2022 12:27) (78 - 101)  BP: 162/94 (13 Oct 2022 12:27) (140/75 - 177/91)  BP(mean): 122 (13 Oct 2022 12:27) (102 - 126)  RR: 30 (13 Oct 2022 12:27) (26 - 48)  SpO2: 95% (13 Oct 2022 12:27) (90% - 95%)    Parameters below as of 13 Oct 2022 12:27  Patient On (Oxygen Delivery Method): room air        Physical Exam  GEN: NAD, looks comfortable  Psych: Mood appropriate  Neuro: A&Ox3.  No focal deficits.  Moving all extremities. Appears distracted/ agitated   HEENT: No obvious abnormalities  CV: S1S2, regular, no murmurs appreciated.  No carotid bruits.  No JVD  Lungs: Clear B/L.  No wheezing, rales or rhonchi  ABD: Soft, non-tender, non-distended.  +Bowel sounds  EXT: Warm and well perfused.  No peripheral edema noted  Musculoskeletal: Moving all extremities with normal ROM, no joint swelling  PV: Pedal pulses palpable      LABS:                        13.4   7.32  )-----------( 200      ( 13 Oct 2022 06:19 )             39.1     10-13    140  |  114<H>  |  28<H>  ----------------------------<  124<H>  3.4<L>   |  18<L>  |  4.35<H>    Ca    13.8<HH>      13 Oct 2022 06:19  Phos  3.2     10-13  Mg     2.3     10-13    TPro  5.2<L>  /  Alb  2.5<L>  /  TBili  0.4  /  DBili  x   /  AST  20  /  ALT  12  /  AlkPhos  71  10-13          RADIOLOGY & ADDITIONAL STUDIES:  Thyroid U/S < from: US Thyroid (10.12.22 @ 17:10) >  ISTHMUS:  Dimensions: 0.3 cm AP  The isthmus lobe contains no visible nodules.    Cervical Lymph Node Evaluation: A normal-sized and normal-appearing lymph   node is present in the left lower neck. It measures 1.1 x 0.8 x 0.9 cm,   has an echogenic hilum, and is avascular on Doppler imaging.    Parathyroid Examination: Parathyroid glands not visualized.      IMPRESSION:  No parathyroid adenoma is seen.    < end of copied text >      CXR: < from: Xray Chest 1 View- PORTABLE-Urgent (Xray Chest 1 View- PORTABLE-Urgent .) (10.12.22 @ 07:18) >  HISTORY: Fever    IMPRESSION:    Limited examination secondary to rotation/patient positioning. Cannot  exclude left lower lung infiltrates on this exam. No left upper lung or   right lung infiltrate or consolidation. No pleural effusion. No   pneumothorax.    < end of copied text >      CT Scan: < from: CT Chest No Cont (10.11.22 @ 09:50) >  IMPRESSION:    1. Thick-walled cystic and/or necrotic mediastinal lesion measuring 3.5 x   2.4 x 3.2 described above. There is surrounding regional lymphadenopathy   or significant fat planes stranding. Abbreviated differential includes a   bronchopulmonary malformation such as a bronchogenic and esophageal   duplication cyst. Cystic degeneration of a malignant mediastinal lesion  including lymphoma and necrotic lymphadenopathy. Further characterization   with a thoracic MRI as well as whole-body PET/CT and ultimately   histological sampling.  2. No surrounding lymphadenopathy or fat plane stranding.  3. Shaped-like endplates involving the thoracic spine. Wrist may be   degenerative. Possibility of sickle cell disease cannot be excluded.    < end of copied text >  < from: NM Parathyroid Imaging w/Spect CT (10.12.22 @ 15:45) >      IMPRESSION:  1. Focal uptake corresponding to a 3.2 cm nodule at the aortopulmonary   region suggestive of an ectopic parathyroid adenoma within the   mediastinum. Uptake is somewhat lower than expected, although the dose of   Cardiolite given was lower than planned due to delays in patient   transport.  2. Diffuse uptake in the thyroid gland. Subtle parathyroid hyperplasia or   adenomas at the thyroid cannot be excluded.    --- End of Report ---    < end of copied text >    EKG:    < from: TTE Echo Complete w/ Contrast w/ Doppler (10.11.22 @ 14:32) >  CONCLUSIONS:     1. Mild symmetric left ventricular hypertrophy.   2. Hyperdynamic left ventricular systolic function.   3. Grade I left ventricular diastolic dysfunction.   4. Normal right ventricular size and systolic function.   5. No significant valvular disease.   6. No evidence of pulmonary hypertension.   7. No pericardial effusion.    < end of copied text >      Cardiac Cath: Not done

## 2022-10-13 NOTE — CONSULT NOTE ADULT - ASSESSMENT
53 yo M w/ PMHx HTN, T2DM, HLD, Hypothyroidism, CVA (most recently admitted ~4w ago, admitted at [facility], R weakness, slurred speech) presents with hypercalcemia likely 2/2 parathyroid adenoma/carcinoma.       # Preop   51 yo M w/ PMHx HTN, T2DM, HLD, Hypothyroidism, CVA (most recently admitted ~4w ago, admitted at [facility], R weakness, slurred speech) presents with hypercalcemia likely 2/2 parathyroid adenoma/carcinoma.       # Preop  EKG nonischemic  TTE w/normal LV function  No active cardiac complaints  Good functional capacity  -Low risk for intermediate risk procedure  -No further cardiac workup

## 2022-10-13 NOTE — PROGRESS NOTE ADULT - SUBJECTIVE AND OBJECTIVE BOX
SUBJECTIVE / OVERNIGHT EVENTS  Patient was seen and examined this morning.     REVIEW OF SYSTEMS  Constitutional:  Negative fever, chills or loss of appetite.  Eyes:  Negative blurry vision or double vision.  Cardiovascular:  Negative for chest pain or palpitations.  Respiratory:  Negative for cough, wheezing, or SOB.   Gastrointestinal:  Negative for nausea, vomiting, diarrhea, constipation, or abdominal pain.  Genitourinary:  Negative frequency, urgency or dysuria.  Neurologic:  No headache, confusion, dizziness, lightheadedness.    PHYSICAL EXAM  Vital Signs Last 24 Hrs  T(C): 37.3 (13 Oct 2022 18:10), Max: 38.5 (12 Oct 2022 20:32)  T(F): 99.1 (13 Oct 2022 18:10), Max: 101.3 (12 Oct 2022 20:32)  HR: 76 (13 Oct 2022 16:15) (76 - 101)  BP: 167/88 (13 Oct 2022 16:15) (140/75 - 175/93)  BP(mean): 120 (13 Oct 2022 16:15) (102 - 126)  RR: 25 (13 Oct 2022 16:15) (25 - 48)  SpO2: 96% (13 Oct 2022 16:15) (90% - 96%)    Parameters below as of 13 Oct 2022 16:15  Patient On (Oxygen Delivery Method): room air        Constitutional: Awake, alert, in no acute distress.   HEENT: Normocephalic, atraumatic, MADY, no proptosis or lid retraction.   Neck: supple, no acanthosis, no thyromegaly or palpable thyroid nodules.  Respiratory: Lungs clear to ausculation bilaterally.   Cardiovascular: regular rhythm, normal S1 and S2, no audible murmurs.   GI: soft, non-tender, non-distended, bowel sounds present, no masses appreciated.  Extremities: No lower extremity edema, peripheral pulses present.   Skin: no rashes.   Psychiatric: AAO x 3. Normal affect/mood.     LABS                        13.4   7.32  )-----------( 200      ( 13 Oct 2022 06:19 )             39.1     10-13    140  |  114<H>  |  28<H>  ----------------------------<  124<H>  3.4<L>   |  18<L>  |  4.35<H>    Ca    13.8<HH>      13 Oct 2022 06:19  Phos  3.2     10-13  Mg     2.3     10-13    TPro  5.2<L>  /  Alb  2.5<L>  /  TBili  0.4  /  DBili  x   /  AST  20  /  ALT  12  /  AlkPhos  71  10-13              MEDICATIONS  MEDICATIONS  (STANDING):  amLODIPine   Tablet 5 milliGRAM(s) Oral daily  atorvastatin 80 milliGRAM(s) Oral at bedtime  levothyroxine Injectable 20 MICROGram(s) IV Push <User Schedule>  piperacillin/tazobactam IVPB.. 4.5 Gram(s) IV Intermittent every 12 hours  sodium chloride 0.9%. 1000 milliLiter(s) (200 mL/Hr) IV Continuous <Continuous>    MEDICATIONS  (PRN):  acetaminophen     Tablet .. 975 milliGRAM(s) Oral every 6 hours PRN Temp greater or equal to 38C (100.4F), Mild Pain (1 - 3), Moderate Pain (4 - 6)  aluminum hydroxide/magnesium hydroxide/simethicone Suspension 30 milliLiter(s) Oral every 6 hours PRN Dyspepsia  melatonin 3 milliGRAM(s) Oral at bedtime PRN Insomnia  ondansetron Injectable 4 milliGRAM(s) IV Push every 6 hours PRN Nausea and/or Vomiting      CAPILLARY BLOOD GLUCOSE & INSULIN RECEIVED  Yesterday  - Dinner FSG: *** mg/dL = *** units of premeal Lispro + *** units of Lispro sliding scale.   - Bedtime FSG: *** mg/dL = *** units of Lantus + *** units Lispro sliding scale.     Today  - Breakfast FSG: *** mg/dL = *** units of premeal Lispro + *** units of Lispro sliding scale.   - Lunch FSG: *** mg/dL = *** units of premeal Lispro + *** units of Lispro sliding scale.     CAPILLARY BLOOD GLUCOSE          ASSESSMENT / RECOMMENDATIONS      A1C: ***   Weight: ***   BMI: ***  Creatinine: ***  GFR: ***  Ejection Fraction: ***    # Type 2 diabetes mellitus  - Please continue lantus *** units at bedtime.   - Continue lispro *** units before each meal.  - Continue lispro moderate / low dose sliding scale four times daily with meals and at bedtime.  - Patient's fingerstick glucose goal is 100-180 mg/dL.    - For discharge, patient can ***.    - Patient can follow up at discharge with [facility] by calling [phone] to make an appointment.      Thank you for allowing us to participate in the care of ***.    Will continue to monitor.       Case discussed with Dr. Fox. Primary team updated.       Sen Henson    Endocrinology Fellow    Service Pager: [phone]    SUBJECTIVE / OVERNIGHT EVENTS  Overnight, patient had fever with a Tmax of 101.3 F. Patient was seen and examined this morning. He didn't have any concerns or complaints; he says that he feels somewhat better and more awake that yesterday. Patient's corrected calcium decreased from 17.1 mg/dL yesterday to 15.0 mg/dL today. His urinary output was 1.1 liters over the past 24 hours (decreased from 3.9 liters yesterday). NM parathyroid scan showed focal uptake corresponding to a 3.2 cm nodule at the aortopulmonary region suggestive of an ectopic parathyroid adenoma within the mediastinum, differential diagnosis includes a neoplastic process. He's now planned for mediastinal mass excision tomorrow.     REVIEW OF SYSTEMS  Constitutional:  (+) Fever, loss of appetite.   Eyes:  Negative blurry vision or double vision.  Cardiovascular:  Negative for chest pain or palpitations.  Respiratory:  Negative for cough, wheezing, or shortness of breath.   Gastrointestinal:  Negative for nausea, vomiting, diarrhea, constipation, or abdominal pain.  Neurologic:  No headache, confusion, dizziness, lightheadedness.    PHYSICAL EXAM  Vital Signs Last 24 Hrs  T(C): 37.3 (13 Oct 2022 18:10), Max: 38.5 (12 Oct 2022 20:32)  T(F): 99.1 (13 Oct 2022 18:10), Max: 101.3 (12 Oct 2022 20:32)  HR: 76 (13 Oct 2022 16:15) (76 - 101)  BP: 167/88 (13 Oct 2022 16:15) (140/75 - 175/93)  BP(mean): 120 (13 Oct 2022 16:15) (102 - 126)  RR: 25 (13 Oct 2022 16:15) (25 - 48)  SpO2: 96% (13 Oct 2022 16:15) (90% - 96%)    Parameters below as of 13 Oct 2022 16:15  Patient On (Oxygen Delivery Method): room air    Constitutional: Awake, alert, male, in no acute distress.   HEENT: Normocephalic, atraumatic, MADY.  Respiratory: (+) Decreased breath sounds over bases, coarse breath sounds. Patient is visibly tachypneic; however, doesn't report feeling short of breath.   Cardiovascular: regular rhythm, normal S1 and S2, no audible murmurs.   GI: soft, non-tender, non-distended, bowel sounds present.  Extremities: No lower extremity edema.  Psychiatric: AAO x 3. Normal affect/mood.     LABS                        13.4   7.32  )-----------( 200      ( 13 Oct 2022 06:19 )             39.1     140  |  114<H>  |  28<H>  ----------------------------<  124<H>  3.4<L>   |  18<L>  |  4.35<H>    Ca    13.8<HH>      13 Oct 2022 06:19  Phos  3.2     10-13  Mg     2.3     10-13  TPro  5.2<L>  /  Alb  2.5<L>  /  TBili  0.4  /  DBili  x   /  AST  20  /  ALT  12  /  AlkPhos  71  10-13    CORRECTED CALCIUM TREND:  15.0 mg/dL (10/13/22 - 06:19)   17.1 mg/dL (10/12/22 - 06:18)   17.5 mg/dL (10/11/22 - 06:30)   15.2 mg/dL (10/10/22 - 05:30)    16.9 mg/dL (10/9/22 - 05:30)   17.2 mg/dL (10/9/22 - 00:07)     MEDICATIONS  MEDICATIONS  (STANDING):  amLODIPine   Tablet 5 milliGRAM(s) Oral daily  atorvastatin 80 milliGRAM(s) Oral at bedtime  levothyroxine Injectable 20 MICROGram(s) IV Push <User Schedule>  piperacillin/tazobactam IVPB.. 4.5 Gram(s) IV Intermittent every 12 hours  sodium chloride 0.9%. 1000 milliLiter(s) (200 mL/Hr) IV Continuous <Continuous>    MEDICATIONS  (PRN):  acetaminophen     Tablet .. 975 milliGRAM(s) Oral every 6 hours PRN Temp greater or equal to 38C (100.4F), Mild Pain (1 - 3), Moderate Pain (4 - 6)  aluminum hydroxide/magnesium hydroxide/simethicone Suspension 30 milliLiter(s) Oral every 6 hours PRN Dyspepsia  melatonin 3 milliGRAM(s) Oral at bedtime PRN Insomnia  ondansetron Injectable 4 milliGRAM(s) IV Push every 6 hours PRN Nausea and/or Vomiting    ASSESSMENT / RECOMMENDATIONS  Mr. Edmond is a 52-years-old male with a past medical history of type 2 diabetes mellitus, hypertension, hyperlipidemia and  cerebrovascular accidents who presented to the emergency department complaining of nausea and multiple episodes of non-bloody non-bilious vomiting for the past ~4 days. Per family, he appeared to be more lethargic and "not himself" for the past few weeks. Upon admission, his labs were remarkable for hypercalcemia and acute kidney injury.     Upon review of outpatient labs, patient has had hypercalcemia for more than 2 years, last level was 11 mg/dL (9/3/22 at [facility]). In addition, his creatinine was 1.4 as of 8/2022. Per patient, he was told that his calcium was high in the past, and that he was going to need surgery; however, further management of hypercalcemia was postponed due to his stroke. PTH was 659 and Vitamin D 25-OH was 40.8. Hypercalcemia was thought to be secondary to primary hyperparathyroidism. Imaging now remarkable for a 3.2 cm nodule at the aortopulmonary region suggestive of an ectopic parathyroid adenoma within the mediastinum, differential diagnosis included a neoplastic process. He's planned for mediastinal mass removal tomorrow.     A1C: 6.2%  Weight: 91 kg  BMI: 32.5  Creatinine: 4.35  GFR: 16  Ejection Fraction: >75%    # Hypercalcemia / Primary hyperparathyroidism secondary to mediastinal parathyroid adenoma versus carcinoma  - Status post 4 doses of calcitonin on admission. Last dose on 10/10.  - Received pamidronate 60 mg IVP on 10/11.  - Calcium improved to 15.0 mg/dL today. Continue with NS at 200 mL/hr.   - He's planned for removal of mediastinal mass tomorrow morning.     Thank you for allowing us to participate in the care of Mr. Edmond.   Will continue to monitor.       Case discussed with Dr. Fox. Primary team updated.       Sen Henson    Endocrinology Fellow    Service Pager: [phone]

## 2022-10-13 NOTE — PROGRESS NOTE ADULT - PROBLEM SELECTOR PLAN 5
CT Chest w/o Contrast 10/11: Thick-walled cystic and/or necrotic mediastinal lesion measuring 3.5 x 2.4 x 3.2 described above, surrounding regional lymphadenopathy or significant fat planes stranding. DDx: bronchopulmonary malformation such as a bronchogenic and esophageal duplication cyst. Cystic degeneration of a malignant mediastinal lesion including lymphoma and necrotic lymphadenopathy.    - f/u Thyroid US & MRI for further characterization  - endocrine, ENT, and renal following. will f/u recs CT Chest w/o Contrast 10/11: Thick-walled cystic and/or necrotic mediastinal lesion measuring 3.5 x 2.4 x 3.2 described above, surrounding regional lymphadenopathy or significant fat planes stranding. DDx: bronchopulmonary malformation such as a bronchogenic and esophageal duplication cyst. Cystic degeneration of a malignant mediastinal lesion including lymphoma and necrotic lymphadenopathy.    - endocrine, ENT, and renal following.   - planned for OR tomorrow for mediastinal mass resection with ENT & CT surgery, will f/u

## 2022-10-13 NOTE — PROGRESS NOTE ADULT - PROBLEM SELECTOR PLAN 3
- pt frequently hypertensive to SBP 180s, given hydral 5mg intermittently, likely 2/2 IV fluids & holding pt's home BP meds  - 10/11 overnight: SBP reached 200s, was given 10 hydral --> SBP still elevated --> given 10 labetalol   - 10/12 AM: SBP decreased to 130s, with ~40% drop in MAP. Pt appeared lethargic and less responsive, possible 2/2 cerebral hypoperfusion (given discontinuation of IVF & acute drop in SBP following hydralazine/labetalol)    - continue to monitor - pt frequently hypertensive to SBP 180s, given hydral 5mg intermittently, likely 2/2 IV fluids & holding pt's home BP meds  - 10/11 overnight: SBP reached 200s, was given 10 hydral --> SBP still elevated --> given 10 labetalol   - 10/12 AM: SBP decreased to 130s, with ~40% drop in MAP.   - pt appeared fatigued but more alert than yesterday    - continue to monitor

## 2022-10-13 NOTE — PROGRESS NOTE ADULT - ASSESSMENT
51 yo M w/ PMHx HTN, T2DM, HLD, Hypothyroidism, CVA presented with 3-4d of multiple NBNB nausea and vomiting, found to have a calcium of 17.1, likely 2/2 to primary hyperparathyroidism, & found to have mediastinal mass, admitted to telemetry for further management of hypercalcemia & ARPITA.

## 2022-10-13 NOTE — PROGRESS NOTE ADULT - ASSESSMENT
Assessment and Plan:  52y Male PMH HTN, T2DM, HLD, Hypothyroidism, CVA 4 wks ago w/ residual R weakness and slurred speech admitted 10/9 for 3-4d of multiple NBNB nausea and vomiting found to have elevated Ca to 17, , vit d 40.8. Also with ARPITA Cr 3.89. Nuclear scan 10/12 reveals neoplastic process.   Plan:   -f/u thyroid US result   - please hold xarelto and restart with other a/c which may be shorter acting such as hep gtt or lovenox. defer to primary team on choice of agent  - we will follow along  - discussed with Dr Medina who will be contacting CT surgery   [facility] at [phone] with any questions/concerns.    Renée Chua PA-C  10-13-22 @ 10:01     Assessment and Plan:  52y Male PMH HTN, T2DM, HLD, Hypothyroidism, CVA 4 wks ago w/ residual R weakness and slurred speech admitted 10/9 for 3-4d of multiple NBNB nausea and vomiting found to have elevated Ca to 17, , vit d 40.8. Also with ARPITA Cr 3.89. Nuclear scan 10/12 reveals neoplastic process.  Plan for mediastinal mass resection, possible sternotomy, possible exploration of parathyroid gland 10/14.     Plan:   -f/u thyroid US final result   - Plan for OR 10/14, patient is consented   - Pre op labs: covid swab, bmp, cbc, coags,   - Make NPO and hold heparin midnight   - please hold xarelto and restart with other a/c which may be shorter acting such as hep gtt or lovenox. defer to primary team on choice of agent  - we will follow along  [facility] at [phone] with any questions/concerns.    Renée Chua PA-C  10-13-22 @ 10:01

## 2022-10-13 NOTE — PROGRESS NOTE ADULT - PROBLEM SELECTOR PLAN 2
- pt acutely febrile to 101F  (10/11 overnight), given tylenol, blood cultures drawn  - started on vanc and zosyn for HAP coverage, given hospitalizations    Plan:   - f/u BCx, monitor leukocytosis (downtrending)  - f/u Vanc trough in AM, re-dose as needed  - continue zosyn 4.5g q12h  - f/u MRSA

## 2022-10-13 NOTE — PROGRESS NOTE ADULT - SUBJECTIVE AND OBJECTIVE BOX
Patient is a 52y Male seen and evaluated at bedside. Overnight events noted.       Meds:    acetaminophen     Tablet .. 975 every 6 hours PRN  aluminum hydroxide/magnesium hydroxide/simethicone Suspension 30 every 6 hours PRN  amLODIPine   Tablet 5 daily  atorvastatin 80 at bedtime  heparin   Injectable 5000 every 8 hours  levothyroxine Injectable 20 <User Schedule>  melatonin 3 at bedtime PRN  ondansetron Injectable 4 every 6 hours PRN  piperacillin/tazobactam IVPB.. 4.5 every 12 hours  sodium chloride 0.9%. 1000 <Continuous>  vancomycin  IVPB 1500 once      T(C): , Max: 38.5 (10-12-22 @ 20:32)  T(F): , Max: 101.3 (10-12-22 @ 20:32)  HR: 82 (10-13-22 @ 05:15)  BP: 140/75 (10-13-22 @ 05:15)  BP(mean): 102 (10-13-22 @ 05:15)  RR: 46 (10-13-22 @ 05:15)  SpO2: 94% (10-13-22 @ 05:15)  Wt(kg): --    10-12 @ 07:01  -  10-13 @ 07:00  --------------------------------------------------------  IN: 3900 mL / OUT: 1350 mL / NET: 2550 mL    10-13 @ 07:01  -  10-13 @ 08:42  --------------------------------------------------------  IN: 0 mL / OUT: 400 mL / NET: -400 mL          Review of Systems:  ROS negative except as per HPI      PHYSICAL EXAM:  General: NAD, AAOX3  Cardiovascular: S1, S2 normal; RRR, no M/G/R  Respiratory: CTABL; no W/R/R  Gastrointestinal: soft. +mild, diffuse tenderness. ND. no rebound or guarding.   Extremities: WWP; no edema, clubbing or cyanosis  Neurological: AAOx3; No focal deficits noted      LABS:                        13.4   7.32  )-----------( 200      ( 13 Oct 2022 06:19 )             39.1     10-13    140  |  114<H>  |  28<H>  ----------------------------<  124<H>  3.4<L>   |  18<L>  |  4.35<H>    Ca    13.8<HH>      13 Oct 2022 06:19  Phos  3.2     10-13  Mg     2.3     10-13    TPro  5.2<L>  /  Alb  2.5<L>  /  TBili  0.4  /  DBili  x   /  AST  20  /  ALT  12  /  AlkPhos  71  10-13                RADIOLOGY & ADDITIONAL STUDIES:           Patient is a 52y Male seen and evaluated at bedside. Overnight events noted. Pt laying in bed, appears comfortable. Denies any new symptoms. Ca improved to 13.8 today.      Meds:    acetaminophen     Tablet .. 975 every 6 hours PRN  aluminum hydroxide/magnesium hydroxide/simethicone Suspension 30 every 6 hours PRN  amLODIPine   Tablet 5 daily  atorvastatin 80 at bedtime  heparin   Injectable 5000 every 8 hours  levothyroxine Injectable 20 <User Schedule>  melatonin 3 at bedtime PRN  ondansetron Injectable 4 every 6 hours PRN  piperacillin/tazobactam IVPB.. 4.5 every 12 hours  sodium chloride 0.9%. 1000 <Continuous>  vancomycin  IVPB 1500 once      T(C): , Max: 38.5 (10-12-22 @ 20:32)  T(F): , Max: 101.3 (10-12-22 @ 20:32)  HR: 82 (10-13-22 @ 05:15)  BP: 140/75 (10-13-22 @ 05:15)  BP(mean): 102 (10-13-22 @ 05:15)  RR: 46 (10-13-22 @ 05:15)  SpO2: 94% (10-13-22 @ 05:15)  Wt(kg): --    10-12 @ 07:01  -  10-13 @ 07:00  --------------------------------------------------------  IN: 3900 mL / OUT: 1350 mL / NET: 2550 mL    10-13 @ 07:01  -  10-13 @ 08:42  --------------------------------------------------------  IN: 0 mL / OUT: 400 mL / NET: -400 mL          Review of Systems:  ROS negative except as per HPI      PHYSICAL EXAM:  General: NAD, AAOX3  Cardiovascular: S1, S2 normal; RRR, no M/G/R  Respiratory: CTABL; no W/R/R  Gastrointestinal: soft. +mild, diffuse tenderness. ND. no rebound or guarding.   Extremities: WWP; no edema, clubbing or cyanosis  Neurological: AAOx3; No focal deficits noted      LABS:                        13.4   7.32  )-----------( 200      ( 13 Oct 2022 06:19 )             39.1     10-13    140  |  114<H>  |  28<H>  ----------------------------<  124<H>  3.4<L>   |  18<L>  |  4.35<H>    Ca    13.8<HH>      13 Oct 2022 06:19  Phos  3.2     10-13  Mg     2.3     10-13    TPro  5.2<L>  /  Alb  2.5<L>  /  TBili  0.4  /  DBili  x   /  AST  20  /  ALT  12  /  AlkPhos  71  10-13                RADIOLOGY & ADDITIONAL STUDIES:

## 2022-10-13 NOTE — CONSULT NOTE ADULT - ATTENDING COMMENTS
Initial attending contact date 10/13/22   . See fellow note written above for details. I reviewed the fellow documentation. I have personally seen and examined this patient. I reviewed vitals, labs, medications, cardiac studies, and additional imaging. I agree with the above fellow's findings and plans as written above with the following additions/statements.    53 yo M w/ PMHx HTN, T2DM, HLD, Hypothyroidism, CVA (most recently admitted ~4w ago, admitted at [facility], R weakness, slurred speech) presents with hypercalcemia likely 2/2 parathyroid adenoma/carcinoma.   -Cardiology consulted for pre op risk assessment  for adenoma resection  -Overall, pt able to perform > 4 METS without anginal equivalents  -EKG NSR nonischemic, APC  -ECHO with normal LVEF, no wall motion abn  -No need for further cardiac work up  -Pt considered low risk for int risk procedure  -Will fu post op

## 2022-10-13 NOTE — PROGRESS NOTE ADULT - SUBJECTIVE AND OBJECTIVE BOX
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 Also with ARPITA Cr 3.89. Nuclear scan 10/12 reveals neoplastic process.     Subjective/ Interval:   10/13: Plan for parathyroidectomy with exploration of mediastinum Dr. Medina reaching out to CT surgery to get them on board.     ALLERGIES:  No Known Allergies      MEDICATIONS:  Antiinfectives:   piperacillin/tazobactam IVPB.. 4.5 Gram(s) IV Intermittent every 12 hours  vancomycin  IVPB 1500 milliGRAM(s) IV Intermittent once    IV fluids:  sodium chloride 0.9%. 1000 milliLiter(s) IV Continuous <Continuous>    Hematologic/Anticoagulation:  heparin   Injectable 5000 Unit(s) SubCutaneous every 8 hours    Pain medications/Neuro:  acetaminophen     Tablet .. 975 milliGRAM(s) Oral every 6 hours PRN  melatonin 3 milliGRAM(s) Oral at bedtime PRN  ondansetron Injectable 4 milliGRAM(s) IV Push every 6 hours PRN    Endocrine Medications:   atorvastatin 80 milliGRAM(s) Oral at bedtime  levothyroxine Injectable 20 MICROGram(s) IV Push <User Schedule>    All other standing medications:   amLODIPine   Tablet 5 milliGRAM(s) Oral daily    All other PRN medications:  aluminum hydroxide/magnesium hydroxide/simethicone Suspension 30 milliLiter(s) Oral every 6 hours PRN    Vital Signs Last 24 Hrs  T(C): 37.3 (13 Oct 2022 09:44), Max: 38.5 (12 Oct 2022 20:32)  T(F): 99.2 (13 Oct 2022 09:44), Max: 101.3 (12 Oct 2022 20:32)  HR: 78 (13 Oct 2022 08:40) (78 - 101)  BP: 175/93 (13 Oct 2022 08:40) (140/75 - 177/91)  BP(mean): 126 (13 Oct 2022 08:40) (102 - 126)  RR: 26 (13 Oct 2022 08:40) (26 - 48)  SpO2: 93% (13 Oct 2022 08:40) (90% - 95%)    Parameters below as of 13 Oct 2022 08:40  Patient On (Oxygen Delivery Method): nasal cannula  O2 Flow (L/min): 2        10-12 @ 07:  -  10-13 @ 07:00  --------------------------------------------------------  IN:    IV PiggyBack: 100 mL    sodium chloride 0.9%: 3000 mL    sodium chloride 0.9%: 400 mL    sodium chloride 0.9%: 400 mL  Total IN: 3900 mL    OUT:    Indwelling Catheter - Urethral (mL): 1150 mL    Voided (mL): 200 mL  Total OUT: 1350 mL    Total NET: 2550 mL      10-13 @ 07:01  -  10-13 @ 10:01  --------------------------------------------------------  IN:  Total IN: 0 mL    OUT:    Indwelling Catheter - Urethral (mL): 400 mL  Total OUT: 400 mL    Total NET: -400 mL      PHYSICAL EXAM:  General: NAD, AAOX3  Neck: soft/flat, no LAD  Respiratory: no respiratory distress, stridor, or stertor  Extremities: WWP; no edema  Neurological: AAOx3; No focal deficits noted    LABS                       13.4   7.32  )-----------( 200      ( 13 Oct 2022 06:19 )             39.1    1013    140  |  114<H>  |  28<H>  ----------------------------<  124<H>  3.4<L>   |  18<L>  |  4.35<H>    Ca    13.8<HH>      13 Oct 2022 06:19  Phos  3.2     10-13  Mg     2.3     10-13    TPro  5.2<L>  /  Alb  2.5<L>  /  TBili  0.4  /  DBili  x   /  AST  20  /  ALT  12  /  AlkPhos  71  10-13         Coagulation Studies-       Endocrine Panel-  Calcium, Total Serum: 13.8 mg/dL (10-13 @ 06:19)  Calcium, Total Serum: 15.6 mg/dL (10-12 @ 18:38)        MICROBIOLOGY:  Culture Results:   No growth at 1 day. (10-12-22 @ 05:52)  Culture Results:   No growth at 1 day. (10-12-22 @ 05:52)      Culture - Blood (collected 10-12-22 @ 05:52)  Source: .Blood Blood  Preliminary Report (10-13-22 @ 06:01):    No growth at 1 day.    Culture - Blood (collected 10-12-22 @ 05:52)  Source: .Blood Blood  Preliminary Report (10-13-22 @ 06:01):    No growth at 1 day.        RADIOLOGY & ADDITIONAL STUDIES:    ACC: [id number] EXAM:  NM PARATHY PLANR IMG W SPECTCT                          *** ADDENDUM***    Differential diagnosis for the 3.2 cm nodule in the mediastinum includes   a neoplastic process.    --- End of Report ---    *** END OF ADDENDUM***      PROCEDURE DATE:  10/12/2022          INTERPRETATION:  Nuclear Medicine Parathyroid SPECT-CT Study    INDICATION: Hyperparathyroidism    COMPARISON: CT chest 10/11/2022    TECHNIQUE: 11.0 mCi technetium 99m Cardiolite was administered   intravenously.Static planar images of the neck and upper chest were   obtained in anterior projection immediately, one hour, and 1.5 hours post   injection. SPECT-CT images were obtained 1.5 hours post injection.   Technetium 99m pertechnetate was then administered intravenously and 2   hour delayed planar images were obtained of the neck and upper chest.    FINDINGS:  Patient had an inability to follow commands. This study is limited by   patient motion. Due to delays in transport, dose of Cardiolite was   suboptimal for this study.    Diffuse uptake is noted in the region of the thyroid glands on SPECT   images. Focal uptake is noted corresponding to a 2.8 x 3.2 cm nodule at   the aortopulmonary region. Physiologic uptake is noted in the salivary   glands and myocardium.      IMPRESSION:  1. Focal uptake corresponding to a 3.2 cm nodule at the aortopulmonary   region suggestive of an ectopic parathyroid adenoma within the   mediastinum. Uptake is somewhat lower than expected, although the dose of   Cardiolite given was lower than planned due to delays in patient   transport.  2. Diffuse uptake in the thyroid gland. Subtle parathyroid hyperplasia or   adenomas at the thyroid cannot be excluded.    --- End of Report ---    ***Please see the addendum at the top of this report. 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 Also with ARPITA . uptake corresponding to a 3.2 cm nodule at the aortopulmonary region suggestive of an ectopic parathyroid adenoma within the mediastinum. Diffuse uptake in the thyroid gland.    Subjective/ Interval:   10/13: Plan for parathyroidectomy with exploration of mediastinum Dr. Medina reaching out to CT surgery to get them on board.     ALLERGIES:  No Known Allergies      MEDICATIONS:  Antiinfectives:   piperacillin/tazobactam IVPB.. 4.5 Gram(s) IV Intermittent every 12 hours  vancomycin  IVPB 1500 milliGRAM(s) IV Intermittent once    IV fluids:  sodium chloride 0.9%. 1000 milliLiter(s) IV Continuous <Continuous>    Hematologic/Anticoagulation:  heparin   Injectable 5000 Unit(s) SubCutaneous every 8 hours    Pain medications/Neuro:  acetaminophen     Tablet .. 975 milliGRAM(s) Oral every 6 hours PRN  melatonin 3 milliGRAM(s) Oral at bedtime PRN  ondansetron Injectable 4 milliGRAM(s) IV Push every 6 hours PRN    Endocrine Medications:   atorvastatin 80 milliGRAM(s) Oral at bedtime  levothyroxine Injectable 20 MICROGram(s) IV Push <User Schedule>    All other standing medications:   amLODIPine   Tablet 5 milliGRAM(s) Oral daily    All other PRN medications:  aluminum hydroxide/magnesium hydroxide/simethicone Suspension 30 milliLiter(s) Oral every 6 hours PRN    Vital Signs Last 24 Hrs  T(C): 37.3 (13 Oct 2022 09:44), Max: 38.5 (12 Oct 2022 20:32)  T(F): 99.2 (13 Oct 2022 09:44), Max: 101.3 (12 Oct 2022 20:32)  HR: 78 (13 Oct 2022 08:40) (78 - 101)  BP: 175/93 (13 Oct 2022 08:40) (140/75 - 177/91)  BP(mean): 126 (13 Oct 2022 08:40) (102 - 126)  RR: 26 (13 Oct 2022 08:40) (26 - 48)  SpO2: 93% (13 Oct 2022 08:40) (90% - 95%)    Parameters below as of 13 Oct 2022 08:40  Patient On (Oxygen Delivery Method): nasal cannula  O2 Flow (L/min): 2        10-12 @ 07:  -  10-13 @ 07:00  --------------------------------------------------------  IN:    IV PiggyBack: 100 mL    sodium chloride 0.9%: 3000 mL    sodium chloride 0.9%: 400 mL    sodium chloride 0.9%: 400 mL  Total IN: 3900 mL    OUT:    Indwelling Catheter - Urethral (mL): 1150 mL    Voided (mL): 200 mL  Total OUT: 1350 mL    Total NET: 2550 mL      10-13 @ 07:  -  10-13 @ 10:01  --------------------------------------------------------  IN:  Total IN: 0 mL    OUT:    Indwelling Catheter - Urethral (mL): 400 mL  Total OUT: 400 mL    Total NET: -400 mL      PHYSICAL EXAM:  General: NAD, AAOX3  Neck: soft/flat, no LAD  Respiratory: no respiratory distress, stridor, or stertor  Extremities: WWP; no edema  Neurological: AAOx3; No focal deficits noted    LABS                       13.4   7.32  )-----------( 200      ( 13 Oct 2022 06:19 )             39.1    10-13    140  |  114<H>  |  28<H>  ----------------------------<  124<H>  3.4<L>   |  18<L>  |  4.35<H>    Ca    13.8<HH>      13 Oct 2022 06:19  Phos  3.2     10-13  Mg     2.3     10-13    TPro  5.2<L>  /  Alb  2.5<L>  /  TBili  0.4  /  DBili  x   /  AST  20  /  ALT  12  /  AlkPhos  71  10-13         Coagulation Studies-       Endocrine Panel-  Calcium, Total Serum: 13.8 mg/dL (10-13 @ 06:19)  Calcium, Total Serum: 15.6 mg/dL (10-12 @ 18:38)        MICROBIOLOGY:  Culture Results:   No growth at 1 day. (10-12-22 @ 05:52)  Culture Results:   No growth at 1 day. (10-12-22 @ 05:52)      Culture - Blood (collected 10-12-22 @ 05:52)  Source: .Blood Blood  Preliminary Report (10-13-22 @ 06:01):    No growth at 1 day.    Culture - Blood (collected 10-12-22 @ 05:52)  Source: .Blood Blood  Preliminary Report (10-13-22 @ 06:01):    No growth at 1 day.        RADIOLOGY & ADDITIONAL STUDIES:    ACC: [id number] EXAM:  NM PARATHY PLANR IMG W SPECTCT                          *** ADDENDUM***    Differential diagnosis for the 3.2 cm nodule in the mediastinum includes   a neoplastic process.    --- End of Report ---    *** END OF ADDENDUM***      PROCEDURE DATE:  10/12/2022          INTERPRETATION:  Nuclear Medicine Parathyroid SPECT-CT Study    INDICATION: Hyperparathyroidism    COMPARISON: CT chest 10/11/2022    TECHNIQUE: 11.0 mCi technetium 99m Cardiolite was administered   intravenously.Static planar images of the neck and upper chest were   obtained in anterior projection immediately, one hour, and 1.5 hours post   injection. SPECT-CT images were obtained 1.5 hours post injection.   Technetium 99m pertechnetate was then administered intravenously and 2   hour delayed planar images were obtained of the neck and upper chest.    FINDINGS:  Patient had an inability to follow commands. This study is limited by   patient motion. Due to delays in transport, dose of Cardiolite was   suboptimal for this study.    Diffuse uptake is noted in the region of the thyroid glands on SPECT   images. Focal uptake is noted corresponding to a 2.8 x 3.2 cm nodule at   the aortopulmonary region. Physiologic uptake is noted in the salivary   glands and myocardium.      IMPRESSION:  1. Focal uptake corresponding to a 3.2 cm nodule at the aortopulmonary   region suggestive of an ectopic parathyroid adenoma within the   mediastinum. Uptake is somewhat lower than expected, although the dose of   Cardiolite given was lower than planned due to delays in patient   transport.  2. Diffuse uptake in the thyroid gland. Subtle parathyroid hyperplasia or   adenomas at the thyroid cannot be excluded.    --- End of Report ---    ***Please see the addendum at the top of this report. 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 Also with ARPITA . uptake corresponding to a 3.2 cm nodule at the aortopulmonary region suggestive of an ectopic parathyroid adenoma within the mediastinum. Diffuse uptake in the thyroid gland.    Subjective/ Interval:   10/13: Plan for parathyroidectomy with exploration of mediastinum with Dr. Medina and CTS 10/14.     ALLERGIES:  No Known Allergies      MEDICATIONS:  Antiinfectives:   piperacillin/tazobactam IVPB.. 4.5 Gram(s) IV Intermittent every 12 hours  vancomycin  IVPB 1500 milliGRAM(s) IV Intermittent once    IV fluids:  sodium chloride 0.9%. 1000 milliLiter(s) IV Continuous <Continuous>    Hematologic/Anticoagulation:  heparin   Injectable 5000 Unit(s) SubCutaneous every 8 hours    Pain medications/Neuro:  acetaminophen     Tablet .. 975 milliGRAM(s) Oral every 6 hours PRN  melatonin 3 milliGRAM(s) Oral at bedtime PRN  ondansetron Injectable 4 milliGRAM(s) IV Push every 6 hours PRN    Endocrine Medications:   atorvastatin 80 milliGRAM(s) Oral at bedtime  levothyroxine Injectable 20 MICROGram(s) IV Push <User Schedule>    All other standing medications:   amLODIPine   Tablet 5 milliGRAM(s) Oral daily    All other PRN medications:  aluminum hydroxide/magnesium hydroxide/simethicone Suspension 30 milliLiter(s) Oral every 6 hours PRN    Vital Signs Last 24 Hrs  T(C): 37.3 (13 Oct 2022 09:44), Max: 38.5 (12 Oct 2022 20:32)  T(F): 99.2 (13 Oct 2022 09:44), Max: 101.3 (12 Oct 2022 20:32)  HR: 78 (13 Oct 2022 08:40) (78 - 101)  BP: 175/93 (13 Oct 2022 08:40) (140/75 - 177/91)  BP(mean): 126 (13 Oct 2022 08:40) (102 - 126)  RR: 26 (13 Oct 2022 08:40) (26 - 48)  SpO2: 93% (13 Oct 2022 08:40) (90% - 95%)    Parameters below as of 13 Oct 2022 08:40  Patient On (Oxygen Delivery Method): nasal cannula  O2 Flow (L/min): 2        10-12 @ 07:  -  10-13 @ 07:00  --------------------------------------------------------  IN:    IV PiggyBack: 100 mL    sodium chloride 0.9%: 3000 mL    sodium chloride 0.9%: 400 mL    sodium chloride 0.9%: 400 mL  Total IN: 3900 mL    OUT:    Indwelling Catheter - Urethral (mL): 1150 mL    Voided (mL): 200 mL  Total OUT: 1350 mL    Total NET: 2550 mL      10-13 @ 07:  -  10-13 @ 10:01  --------------------------------------------------------  IN:  Total IN: 0 mL    OUT:    Indwelling Catheter - Urethral (mL): 400 mL  Total OUT: 400 mL    Total NET: -400 mL      PHYSICAL EXAM:  General: NAD, AAOX3  Neck: soft/flat, no LAD  Respiratory: no respiratory distress, stridor, or stertor  Extremities: WWP; no edema  Neurological: AAOx3; No focal deficits noted    LABS                       13.4   7.32  )-----------( 200      ( 13 Oct 2022 06:19 )             39.1    10-13    140  |  114<H>  |  28<H>  ----------------------------<  124<H>  3.4<L>   |  18<L>  |  4.35<H>    Ca    13.8<HH>      13 Oct 2022 06:19  Phos  3.2     10-13  Mg     2.3     10-13    TPro  5.2<L>  /  Alb  2.5<L>  /  TBili  0.4  /  DBili  x   /  AST  20  /  ALT  12  /  AlkPhos  71  10-13         Coagulation Studies-       Endocrine Panel-  Calcium, Total Serum: 13.8 mg/dL (10-13 @ 06:19)  Calcium, Total Serum: 15.6 mg/dL (10-12 @ 18:38)        MICROBIOLOGY:  Culture Results:   No growth at 1 day. (10-12-22 @ 05:52)  Culture Results:   No growth at 1 day. (10-12-22 @ 05:52)      Culture - Blood (collected 10-12-22 @ 05:52)  Source: .Blood Blood  Preliminary Report (10-13-22 @ 06:01):    No growth at 1 day.    Culture - Blood (collected 10-12-22 @ 05:52)  Source: .Blood Blood  Preliminary Report (10-13-22 @ 06:01):    No growth at 1 day.        RADIOLOGY & ADDITIONAL STUDIES:    ACC: [id number] EXAM:  NM PARATHY PLANR IMG W SPECTCT                          *** ADDENDUM***    Differential diagnosis for the 3.2 cm nodule in the mediastinum includes   a neoplastic process.    --- End of Report ---    *** END OF ADDENDUM***      PROCEDURE DATE:  10/12/2022          INTERPRETATION:  Nuclear Medicine Parathyroid SPECT-CT Study    INDICATION: Hyperparathyroidism    COMPARISON: CT chest 10/11/2022    TECHNIQUE: 11.0 mCi technetium 99m Cardiolite was administered   intravenously.Static planar images of the neck and upper chest were   obtained in anterior projection immediately, one hour, and 1.5 hours post   injection. SPECT-CT images were obtained 1.5 hours post injection.   Technetium 99m pertechnetate was then administered intravenously and 2   hour delayed planar images were obtained of the neck and upper chest.    FINDINGS:  Patient had an inability to follow commands. This study is limited by   patient motion. Due to delays in transport, dose of Cardiolite was   suboptimal for this study.    Diffuse uptake is noted in the region of the thyroid glands on SPECT   images. Focal uptake is noted corresponding to a 2.8 x 3.2 cm nodule at   the aortopulmonary region. Physiologic uptake is noted in the salivary   glands and myocardium.      IMPRESSION:  1. Focal uptake corresponding to a 3.2 cm nodule at the aortopulmonary   region suggestive of an ectopic parathyroid adenoma within the   mediastinum. Uptake is somewhat lower than expected, although the dose of   Cardiolite given was lower than planned due to delays in patient   transport.  2. Diffuse uptake in the thyroid gland. Subtle parathyroid hyperplasia or   adenomas at the thyroid cannot be excluded.    --- End of Report ---    ***Please see the addendum at the top of this report. It may contain   additional important information or changes.****        ULTRASOUND       INTERPRETATION:  Neck ULTRASOUND with Doppler dated 10/12/2022 5:10 PM    History: Hyperparathyroidism. Evaluate the parathyroid.    Technique: Ultrasound evaluation of the neck was performed in the axial   and sagittal projections. Color Doppler evaluation was also performed.    Prior study: Nuclear medicine parathyroid SPECT-CT study from 10/12/2022   and chest CT from 10/11/2022.    Findings:    Thyroid:    RIGHT LOBE:  Dimensions: 4.5 x 2.2 x 2.7 cm (sagittal x AP x transverse)  Echotexture: Homogeneous  Vascularity: Normal  The right lobe contains no visible nodules.    LEFT LOBE:  Dimensions: 4.1 x 1.7 x 2.3 cm (sagittal x AP x transverse)  Echotexture: Homogeneous  Vascularity: Normal  The left lobe contains no visible nodules.    ISTHMUS:  Dimensions: 0.3 cm AP  The isthmus lobe contains no visible nodules.    Cervical Lymph Node Evaluation: A normal-sized and normal-appearing lymph   node is present in the left lower neck. It measures 1.1 x 0.8 x 0.9 cm,   has an echogenic hilum, and is avascular on Doppler imaging.    Parathyroid Examination: Parathyroid glands not visualized.      IMPRESSION:  No parathyroid adenoma is seen.      --- End of Report ---

## 2022-10-13 NOTE — CONSULT NOTE ADULT - SUBJECTIVE AND OBJECTIVE BOX
HPI:  53 yo M w/ PMHx HTN, T2DM, HLD, Hypothyroidism, CVA (most recently admitted ~4w ago, admitted at [facility], R weakness, slurred speech) presents with 3-4d of multiple NBNB nausea and vomiting. In the ED, he was found to have a calcium of 17.1. He says he hasn't been able to keep down any liquids/food. Also, per the son over the past few weeks he has seemed more lethargic and not himself. 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 He takes Lisinopril-HCTZ daily for HTN. He denies recent tums or calcium supplementation. Most recent Calcium 9/3 at [facility] was 11. No other systemic symptoms. Denies HA, confusion, f/c, cough, sore throat, SOB, CP, abd pain, urinary symptoms, new weakness/numbness, black/bloody stool, rashes.    In the ED:  Initial vital signs: T: 98.9 degrees F, HR: 59, BP: 175/111, R: 18, SpO2: 96% on RA  Labs: significant for WBC 12.27, Hgb 17.3, hypercalcemia 17.1, BUN 25, Cr 3.34,   Imaging: US Abd :No cholelithiasis or cholecystitis.  EKG: Sinus rhythm w/ PVCs, normal QTc  Medications: 2L NS Bolus, Zofran 4mg   Consults: none  (09 Oct 2022 01:41)    Pt admitted for hypercalcemia. Imaging revealed parathyroid mass, possibly malignant.     ROS: A 10-point review of systems was otherwise negative.    PAST MEDICAL & SURGICAL HISTORY:  Stroke      HTN (hypertension)      Diabetes      Hypothyroidism      Hyperlipidemia          SOCIAL HISTORY:  FAMILY HISTORY:      ALLERGIES: 	  No Known Allergies            MEDICATIONS:  acetaminophen     Tablet .. 975 milliGRAM(s) Oral every 6 hours PRN  aluminum hydroxide/magnesium hydroxide/simethicone Suspension 30 milliLiter(s) Oral every 6 hours PRN  amLODIPine   Tablet 5 milliGRAM(s) Oral daily  atorvastatin 80 milliGRAM(s) Oral at bedtime  heparin   Injectable 5000 Unit(s) SubCutaneous every 8 hours  levothyroxine Injectable 20 MICROGram(s) IV Push <User Schedule>  melatonin 3 milliGRAM(s) Oral at bedtime PRN  ondansetron Injectable 4 milliGRAM(s) IV Push every 6 hours PRN  piperacillin/tazobactam IVPB.. 4.5 Gram(s) IV Intermittent every 12 hours  sodium chloride 0.9%. 1000 milliLiter(s) IV Continuous <Continuous>  vancomycin  IVPB 1500 milliGRAM(s) IV Intermittent once      HOME MEDICATIONS:  amLODIPine 5 mg oral tablet: 1 tab(s) orally once a day  aspirin 81 mg oral delayed release tablet: 1 tab(s) orally once a day  ezetimibe 10 mg oral tablet: 1 tab(s) orally once a day  gabapentin 100 mg oral capsule: 1 cap(s) orally 3 times a day  levothyroxine 25 mcg (0.025 mg) oral tablet: 1 tab(s) orally once a day  lisinopril-hydrochlorothiazide 20 mg-12.5 mg oral tablet: 1 tab(s) orally once a day  rosuvastatin 40 mg oral tablet: 1 tab(s) orally once a day  Xarelto 20 mg oral tablet: 1 tab(s) orally once a day (in the evening)      PHYSICAL EXAM:      I/O Summary 24H    IN: 3900 mL / OUT: 1350 mL / NET: 2550 mL        T(F): 100.1 (10-13-22 @ 04:42), Max: 101.3 (10-12-22 @ 20:32)  HR: 82 (10-13-22 @ 05:15) (82 - 101)  BP: 140/75 (10-13-22 @ 05:15) (140/75 - 177/91)  BP(mean): 102 (10-13-22 @ 05:15) (102 - 126)  ABP: --  ABP(mean): --  RR: 46 (10-13-22 @ 05:15) (35 - 48)  SpO2: 94% (10-13-22 @ 05:15) (90% - 95%)    GEN: Awake, comfortable. NAD.   HEENT: NCAT, PERRL, EOMI. Mucosa moist. No JVD.   RESP: CTA b/l  CV: RRR, normal s1/s2. No m/r/g.  ABD: Soft, NTND. BS+  EXT: Warm. No edema, clubbing, or cyanosis.   NEURO: AAOx3. No focal deficits.      	  LABS:	 	    CARDIAC MARKERS:              CBC 10-13-22 @ 06:19                        13.4   7.32  )-----------( 200                   39.1       Hgb trend: 13.4 <-- , 15.4 <-- , 16.8 <--   WBC trend: 7.32 <-- , 12.87 <-- , 15.37 <--     CMP 10-13-22 @ 06:19    140  |  114<H>  |  28<H>  ----------------------------<  124<H>  3.4<L>   |  18<L>  |  4.35<H>    Ca    13.8<HH>      10-13-22 @ 06:19  Phos  3.2     10-13  Mg     2.3     10-13    TPro  5.2<L>  /  Alb  2.5<L>  /  TBili  0.4  /  DBili  x   /  AST  20  /  ALT  12  /  AlkPhos  71  10-13      Serum Cr trend: 4.35 <-- , 4.25 <-- , 4.11 <-- , 3.97 <-- , 3.89 <-- , 3.70 <--   proBNP:   Lipid Profile:   HgA1c:   TSH:     TELEMETRY: 	    ECG:  	  RADIOLOGY:   ECHO:  < from: TTE Echo Complete w/ Contrast w/ Doppler (10.11.22 @ 14:32) >  Left Ventricle:  There is mild concentric left ventricular hypertrophy. There are no   regional wall motion abnormalities seen. Left ventricular systolic   function is hyperdynamic with a calculated ejection fraction of >75%.   Analysis of mitral annular tissue Doppler, left atrial volume index, and   tricuspid regurgitant velocity reveals: grade I diastolic dysfunction   without elevated filling pressure.    Right Ventricle:  The right ventricle is normal in size. Right ventricular systolic   function is normal. The tricuspid annular plane systolic excursion   (TAPSE) is 17.00 mm (normal >=17 mm).    Left Atrium:  The left atrium is mildly dilated. Leftatrial volume index (ROLNADA) is   37.0 ml/m².    Right Atrium:  The right atrium is normal in size.    Aortic Valve:  The aortic valve is tricuspid with normal structure and function without   stenosis. There is no evidence of aortic regurgitation.    Mitral Valve:  Structurally normal mitral valve with normal leaflet excursion. There is   trace mitral regurgitation.    Tricuspid Valve:  Structurally normal tricuspid valve with normal leaflet excursion. There   is trace tricuspid regurgitation.    Inferior Vena Cava:  The inferior vena cava is normal in size (<2.1cm) with normal inspiratory   collapse (>50%) consistent with normal right atrial pressure (  3, range 0-5mmHg).    Pulmonic Valve:  Structurally normal pulmonic valve with normal leaflet excursion. There   is trace pulmonic regurgitation.    Aorta:  The aortic root is normal in size. The aortic arch is normal without   evidence of aortic coarctation.    Pericardium:  No pericardial effusion is seen.    < end of copied text >    STRESS:  CATH:   HPI:  53 yo M w/ PMHx HTN, T2DM, HLD, Hypothyroidism, CVA (most recently admitted ~4w ago, admitted at [facility], R weakness, slurred speech) presents with 3-4d of multiple NBNB nausea and vomiting. In the ED, he was found to have a calcium of 17.1. He says he hasn't been able to keep down any liquids/food. Also, per the son over the past few weeks he has seemed more lethargic and not himself. 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 He takes Lisinopril-HCTZ daily for HTN. He denies recent tums or calcium supplementation. Most recent Calcium 9/3 at [facility] was 11. No other systemic symptoms. Denies HA, confusion, f/c, cough, sore throat, SOB, CP, abd pain, urinary symptoms, new weakness/numbness, black/bloody stool, rashes.    In the ED:  Initial vital signs: T: 98.9 degrees F, HR: 59, BP: 175/111, R: 18, SpO2: 96% on RA  Labs: significant for WBC 12.27, Hgb 17.3, hypercalcemia 17.1, BUN 25, Cr 3.34,   Imaging: US Abd :No cholelithiasis or cholecystitis.  EKG: Sinus rhythm w/ PVCs, normal QTc  Medications: 2L NS Bolus, Zofran 4mg   Consults: none  (09 Oct 2022 01:41)    Pt admitted for hypercalcemia. Imaging revealed parathyroid mass, possibly malignant. Pt denies ever having chest pain/NUNO. In weeks prior to hospitalization, pt able to walk 10+ blocks and climb 2+ flights of stairs without symptoms.     ROS: A 10-point review of systems was otherwise negative.    PAST MEDICAL & SURGICAL HISTORY:  Stroke      HTN (hypertension)      Diabetes      Hypothyroidism      Hyperlipidemia          SOCIAL HISTORY:  FAMILY HISTORY:      ALLERGIES: 	  No Known Allergies            MEDICATIONS:  acetaminophen     Tablet .. 975 milliGRAM(s) Oral every 6 hours PRN  aluminum hydroxide/magnesium hydroxide/simethicone Suspension 30 milliLiter(s) Oral every 6 hours PRN  amLODIPine   Tablet 5 milliGRAM(s) Oral daily  atorvastatin 80 milliGRAM(s) Oral at bedtime  heparin   Injectable 5000 Unit(s) SubCutaneous every 8 hours  levothyroxine Injectable 20 MICROGram(s) IV Push <User Schedule>  melatonin 3 milliGRAM(s) Oral at bedtime PRN  ondansetron Injectable 4 milliGRAM(s) IV Push every 6 hours PRN  piperacillin/tazobactam IVPB.. 4.5 Gram(s) IV Intermittent every 12 hours  sodium chloride 0.9%. 1000 milliLiter(s) IV Continuous <Continuous>  vancomycin  IVPB 1500 milliGRAM(s) IV Intermittent once      HOME MEDICATIONS:  amLODIPine 5 mg oral tablet: 1 tab(s) orally once a day  aspirin 81 mg oral delayed release tablet: 1 tab(s) orally once a day  ezetimibe 10 mg oral tablet: 1 tab(s) orally once a day  gabapentin 100 mg oral capsule: 1 cap(s) orally 3 times a day  levothyroxine 25 mcg (0.025 mg) oral tablet: 1 tab(s) orally once a day  lisinopril-hydrochlorothiazide 20 mg-12.5 mg oral tablet: 1 tab(s) orally once a day  rosuvastatin 40 mg oral tablet: 1 tab(s) orally once a day  Xarelto 20 mg oral tablet: 1 tab(s) orally once a day (in the evening)      PHYSICAL EXAM:      I/O Summary 24H    IN: 3900 mL / OUT: 1350 mL / NET: 2550 mL        T(F): 100.1 (10-13-22 @ 04:42), Max: 101.3 (10-12-22 @ 20:32)  HR: 82 (10-13-22 @ 05:15) (82 - 101)  BP: 140/75 (10-13-22 @ 05:15) (140/75 - 177/91)  BP(mean): 102 (10-13-22 @ 05:15) (102 - 126)  ABP: --  ABP(mean): --  RR: 46 (10-13-22 @ 05:15) (35 - 48)  SpO2: 94% (10-13-22 @ 05:15) (90% - 95%)    GEN: Awake, comfortable. NAD.   HEENT: NCAT, PERRL, EOMI. Mucosa moist. No JVD.   RESP: CTA b/l  CV: RRR, normal s1/s2. No m/r/g.  ABD: Soft, NTND. BS+  EXT: Warm. No edema, clubbing, or cyanosis.   NEURO: AAOx3. No focal deficits.      	  LABS:	 	    CARDIAC MARKERS:              CBC 10-13-22 @ 06:19                        13.4   7.32  )-----------( 200                   39.1       Hgb trend: 13.4 <-- , 15.4 <-- , 16.8 <--   WBC trend: 7.32 <-- , 12.87 <-- , 15.37 <--     CMP 10-13-22 @ 06:19    140  |  114<H>  |  28<H>  ----------------------------<  124<H>  3.4<L>   |  18<L>  |  4.35<H>    Ca    13.8<HH>      10-13-22 @ 06:19  Phos  3.2     10-13  Mg     2.3     10-13    TPro  5.2<L>  /  Alb  2.5<L>  /  TBili  0.4  /  DBili  x   /  AST  20  /  ALT  12  /  AlkPhos  71  10-13      Serum Cr trend: 4.35 <-- , 4.25 <-- , 4.11 <-- , 3.97 <-- , 3.89 <-- , 3.70 <--   proBNP:   Lipid Profile:   HgA1c:   TSH:     TELEMETRY: 	    ECG:  	  RADIOLOGY:   ECHO:  < from: TTE Echo Complete w/ Contrast w/ Doppler (10.11.22 @ 14:32) >  Left Ventricle:  There is mild concentric left ventricular hypertrophy. There are no   regional wall motion abnormalities seen. Left ventricular systolic   function is hyperdynamic with a calculated ejection fraction of >75%.   Analysis of mitral annular tissue Doppler, left atrial volume index, and   tricuspid regurgitant velocity reveals: grade I diastolic dysfunction   without elevated filling pressure.    Right Ventricle:  The right ventricle is normal in size. Right ventricular systolic   function is normal. The tricuspid annular plane systolic excursion   (TAPSE) is 17.00 mm (normal >=17 mm).    Left Atrium:  The left atrium is mildly dilated. Leftatrial volume index (ROLANDA) is   37.0 ml/m².    Right Atrium:  The right atrium is normal in size.    Aortic Valve:  The aortic valve is tricuspid with normal structure and function without   stenosis. There is no evidence of aortic regurgitation.    Mitral Valve:  Structurally normal mitral valve with normal leaflet excursion. There is   trace mitral regurgitation.    Tricuspid Valve:  Structurally normal tricuspid valve with normal leaflet excursion. There   is trace tricuspid regurgitation.    Inferior Vena Cava:  The inferior vena cava is normal in size (<2.1cm) with normal inspiratory   collapse (>50%) consistent with normal right atrial pressure (  3, range 0-5mmHg).    Pulmonic Valve:  Structurally normal pulmonic valve with normal leaflet excursion. There   is trace pulmonic regurgitation.    Aorta:  The aortic root is normal in size. The aortic arch is normal without   evidence of aortic coarctation.    Pericardium:  No pericardial effusion is seen.    < end of copied text >    STRESS:  CATH:

## 2022-10-13 NOTE — CONSULT NOTE ADULT - ATTENDING COMMENTS
I saw and evaluated the patient with the Hematology/Oncology fellow, Dr Ty.  Briefly, he is 52 year old man admitted with hypercalcemia and renal failure. Hypercalcemia appears to be due to primary hyperparathyroidism (PTH level is > 650) and a parathryoid scan showed uptake in a mass in the mediastinum.  Hematology/Oncology is consulted for guidance regarding the timing of proceeding to surgery in the setting of recent eliquis usage.  The planned surgery is a mediastinal exploration and resection of the mass.  The patient has been taking eliquis 2.5 mg po BID because of a history of stroke.  His last dose was here in the hospital on 10/11/22 at approximately 9 pm.  Labs are also remarkable for acute renal failure with a creatinine > 4 and eGFR= 16.    For patients with normal or mildly abnormal renal function on the 2.5 mg po BID dose of eliquis, it is recommended that invasive procedures with high bleeding risk be delayed until 48 hours have elapsed from the last dose of eliquis.  Unfortunately, in patients like this gentleman with severely abnormal renal function, the pharmacokinetics and clearance of eliquis are much less predictable;  presumably, clearance will be less and exposure to the drug prolonged.  Therefore, we would recommend waiting a minimum of 3 days/72 hours from the last dose of eliquis before proceeding with an invasive surgical procedure with high bleeding risk such as mediastinal exploration.      Unfortunately, there is no reliable method to test to definitively measure if eliquis is still in his system or if it has cleared;  and there is no approved reversal agent to use in a situation like this to expedite clearance.

## 2022-10-13 NOTE — PROGRESS NOTE ADULT - ASSESSMENT
53 yo M w/ HTN, T2DM presented with nausea and vomiting. Admitted and managed for hypercalcemia. Nephrology consulted for ARPITA w/ sCr 3.34.    #Non-oliguric ARPITA  Pt with no known underlying CKD. Unknown BCr  Cr 3.34 on admission, uptrending, 4.38 today  UA with 100 protein and hematuria. Normal CK.  likely etiology volume depletion 2/2 to hypercalcemia, however FEUrea consistent with intrinsic etiology, unlikely iATN given no documented hypotensive episodes   No Hydronephrosis appreciated on abdominal sono      Plan:   Maintain net even to slightly positive fluid balance  Continue with IVF    Repeat UPCR  Hold RASSI, HCTZ   BID BMP   Monitor Urine out put   Strict Ins and outs   Switch Xeralto to Eliquis   If kidney function continues to worsen, pt may require dialysis in the next few days. This is communicated to the patient and family present at bedside and they showed understanding      #Severe Hypercalcemia   Likely due to Primary hyperparathyroidism  Ca 13.8 now    Plan:  s/p Pamidronate 60mg over 4 hours on 10/11 (Denosumab not available per pharmacy). Effect is usually seen after 2-4 days. Will not repeat a dose  Continue with IVF as per endo recs  Maintain net even to slightly positive fluid balance as Hypovolemia exacerbates hypercalcemia  Maintain the urine output at 100 to 150 mL/hour  BID BMP   Can also give lasix 40mg x1 if patient shows signs of vol overload   Endocrinology on board; pt will need parathyroidectomy    Watson Fletcher M.D  PGY-4 Nephrology  [phone]   51 yo M w/ HTN, T2DM presented with nausea and vomiting. Admitted and managed for hypercalcemia. Nephrology consulted for ARPITA w/ sCr 3.34.    #Non-oliguric ARPITA  Pt with no known underlying CKD. Unknown BCr  Cr 3.34 on admission, uptrending, 4.35 today  UA with 100 protein and hematuria. Normal CK.  likely etiology volume depletion 2/2 to hypercalcemia, however FEUrea consistent with intrinsic etiology, unlikely iATN given no documented hypotensive episodes   No Hydronephrosis appreciated on abdominal sono      Plan:   Maintain net even to slightly positive fluid balance  Continue with IVF  Repeat UPCR  Hold RASSI, HCTZ   BID BMP   Monitor Urine out put   Strict Ins and outs   Switch Xeralto to Eliquis   If kidney function continues to worsen, pt may require dialysis in the next few days. This is communicated to the patient and family present at bedside and they showed understanding      #Severe Hypercalcemia   Likely due to Primary hyperparathyroidism  Ca 13.8 now    Plan:  s/p Pamidronate 60mg over 4 hours on 10/11 (Denosumab not available per pharmacy). Effect is usually seen after 2-4 days. Will not repeat a dose  Continue with IVF as per endo recs  Maintain net even to slightly positive fluid balance as Hypovolemia exacerbates hypercalcemia  BID BMP   Can also give lasix 40mg x1 if patient shows signs of vol overload   Endocrinology on board; pt will need parathyroidectomy    Watson Fletcher M.D  PGY-4 Nephrology  [phone]

## 2022-10-13 NOTE — PROGRESS NOTE ADULT - PROBLEM SELECTOR PLAN 4
- BUN/Cr ratio: 25/3.34. Baseline cr 1.55 according to [facility] records  - Cr uptrending, 4.11 today  - FeUrea 100%, suggests intra-renal.  FeNa 5.2%, suggests post-renal, obstructive. No hydronephrosis on renal US.   - per Renal: Holding BP meds     Plan:  - BMP q12h   - repeat UPCR  - f/u further renal recs - BUN/Cr ratio: 25/3.34. Baseline cr 1.55 according to [facility] records  - Cr uptrending, 4.35 today  - FeUrea 100%, suggests intra-renal.  FeNa 5.2%, suggests post-renal, obstructive. No hydronephrosis on renal US.   - per Renal: Holding BP meds     Plan:  - BMP q12h   - f/u further renal recs

## 2022-10-13 NOTE — PROGRESS NOTE ADULT - PROBLEM SELECTOR PLAN 6
Per HIE: History of prior strokes in 2018, 2019. Home meds Xarelto, ASA 81mg, Atorvastatin 80mg, & Gabapentin for tingling/numbness  - switched xarelto to eliquis 2.5mg BID, per Renal recs     - c/w eliquis 2.5mg BID, aspirin, and high intensity statin therapy  - hold Gabapentin iso ARPITA Per HIE: History of prior strokes in 2018, 2019. Home meds Xarelto, ASA 81mg, Atorvastatin 80mg, & Gabapentin for tingling/numbness  - switched xarelto to eliquis 2.5mg BID, per Renal recs, now switched to heparin 5000u subcu q8h    - c/w high intensity statin therapy  - heparin subcu to stop after midnight for OR tomorrow  - hold Gabapentin iso ARPITA

## 2022-10-13 NOTE — CONSULT NOTE ADULT - ASSESSMENT
51 yo M w/ PMHx HTN, T2DM, HLD, Hypothyroidism, CVA (most recently admitted ~4w ago, admitted at [facility], R weakness, slurred speech) admitted for hypercalcemia.   Hematology consulted for the need for anticoagulation reversal for planned parathyroidectomy with exploration of mediastinum.     Cr 4.35  This patient last took Eliquis 2.5mg on 10/11/22 at 9pm.  For patients with mild renal dysfunction, it is recommended that Eliquis be held at least 48h prior to planned surgery to minimize bleeding risk. While the documented half-life of Eliquis is ~12 hours, its pharmacokinetics are unpredictable in this patient with progressively worsening kidney function. There is also no reliable way of measuring the drug concentration of Eliquis still remaining in his circulation. Use of Andexanet yajaira as a Factor Xa inhibitor reversal agent is only approved for reversal of life-threatening bleeding, and is not recommended for routine pre-surgical use. To minimize bleeding risk, we would recommend waiting a minimum of at least 72h after the last Eliquis dose prior to surgery.    Patient seen and discussed with Dr. Parikh

## 2022-10-13 NOTE — PROGRESS NOTE ADULT - SUBJECTIVE AND OBJECTIVE BOX
OVERNIGHT EVENTS:    SUBJECTIVE / INTERVAL HPI: Patient seen and examined at bedside.     VITAL SIGNS:  Vital Signs Last 24 Hrs  T(C): 37.8 (13 Oct 2022 04:42), Max: 38.5 (12 Oct 2022 20:32)  T(F): 100.1 (13 Oct 2022 04:42), Max: 101.3 (12 Oct 2022 20:32)  HR: 90 (12 Oct 2022 23:58) (86 - 101)  BP: 172/85 (12 Oct 2022 23:58) (134/67 - 178/81)  BP(mean): 120 (12 Oct 2022 23:58) (93 - 126)  RR: 35 (12 Oct 2022 23:58) (35 - 48)  SpO2: 95% (12 Oct 2022 23:58) (90% - 95%)    Parameters below as of 12 Oct 2022 23:58  Patient On (Oxygen Delivery Method): nasal cannula  O2 Flow (L/min): 2      PHYSICAL EXAM:    General: NAD  HEENT: NCAT  Neck: supple, trachea midline  Cardiovascular: S1, S2 normal; RRR, no M/G/R  Respiratory: CTABL; no W/R/R  Gastrointestinal: soft. improved mild tenderness diffusely, ND. no rebound or guarding.   Skin: no ulcerations or visible rashes appreciated  Extremities: WWP; no edema, clubbing or cyanosis  Vascular: 2+ DP pulses.   Neurological: AAOx1-2; lethargic, CN II-XII grossly intact; no focal deficits    MEDICATIONS:  MEDICATIONS  (STANDING):  amLODIPine   Tablet 5 milliGRAM(s) Oral daily  atorvastatin 80 milliGRAM(s) Oral at bedtime  heparin   Injectable 5000 Unit(s) SubCutaneous every 8 hours  levothyroxine Injectable 20 MICROGram(s) IV Push <User Schedule>  piperacillin/tazobactam IVPB.. 4.5 Gram(s) IV Intermittent every 12 hours  sodium chloride 0.9%. 1000 milliLiter(s) (1000 mL/Hr) IV Continuous <Continuous>  sodium chloride 0.9%. 1000 milliLiter(s) (200 mL/Hr) IV Continuous <Continuous>    MEDICATIONS  (PRN):  acetaminophen     Tablet .. 975 milliGRAM(s) Oral every 6 hours PRN Temp greater or equal to 38C (100.4F), Mild Pain (1 - 3), Moderate Pain (4 - 6)  aluminum hydroxide/magnesium hydroxide/simethicone Suspension 30 milliLiter(s) Oral every 6 hours PRN Dyspepsia  melatonin 3 milliGRAM(s) Oral at bedtime PRN Insomnia  ondansetron Injectable 4 milliGRAM(s) IV Push every 6 hours PRN Nausea and/or Vomiting      ALLERGIES:  Allergies    No Known Allergies    Intolerances        LABS:                        13.4   7.32  )-----------( 200      ( 13 Oct 2022 06:19 )             39.1     10-12    143  |  112<H>  |  28<H>  ----------------------------<  123<H>  3.7   |  20<L>  |  4.25<H>    Ca    15.6<HH>      12 Oct 2022 18:38  Phos  3.9     10-12  Mg     1.5     10-12    TPro  6.1  /  Alb  3.1<L>  /  TBili  0.6  /  DBili  x   /  AST  13  /  ALT  10  /  AlkPhos  87  10-12        CAPILLARY BLOOD GLUCOSE          RADIOLOGY & ADDITIONAL TESTS: Reviewed. OVERNIGHT EVENTS:    SUBJECTIVE / INTERVAL HPI: Patient seen and examined at bedside.     VITAL SIGNS:  Vital Signs Last 24 Hrs  T(C): 37.8 (13 Oct 2022 04:42), Max: 38.5 (12 Oct 2022 20:32)  T(F): 100.1 (13 Oct 2022 04:42), Max: 101.3 (12 Oct 2022 20:32)  HR: 90 (12 Oct 2022 23:58) (86 - 101)  BP: 172/85 (12 Oct 2022 23:58) (134/67 - 178/81)  BP(mean): 120 (12 Oct 2022 23:58) (93 - 126)  RR: 35 (12 Oct 2022 23:58) (35 - 48)  SpO2: 95% (12 Oct 2022 23:58) (90% - 95%)    Parameters below as of 12 Oct 2022 23:58  Patient On (Oxygen Delivery Method): nasal cannula  O2 Flow (L/min): 2      PHYSICAL EXAM:    General: NAD, fatigued  HEENT: NCAT  Neck: supple, trachea midline  Cardiovascular: S1, S2 normal; RRR, no M/G/R  Respiratory: CTABL; no W/R/R  Gastrointestinal: soft. no longer tender, ND. no rebound or guarding.   Skin: no ulcerations or visible rashes appreciated  Extremities: WWP; mild 1+ edema UE & LEs b/l. No clubbing or cyanosis  Vascular: 2+ DP pulses.   Neurological: AAOx1-2; lethargic, CN II-XII grossly intact; no focal deficits    MEDICATIONS:  MEDICATIONS  (STANDING):  amLODIPine   Tablet 5 milliGRAM(s) Oral daily  atorvastatin 80 milliGRAM(s) Oral at bedtime  heparin   Injectable 5000 Unit(s) SubCutaneous every 8 hours  levothyroxine Injectable 20 MICROGram(s) IV Push <User Schedule>  piperacillin/tazobactam IVPB.. 4.5 Gram(s) IV Intermittent every 12 hours  sodium chloride 0.9%. 1000 milliLiter(s) (1000 mL/Hr) IV Continuous <Continuous>  sodium chloride 0.9%. 1000 milliLiter(s) (200 mL/Hr) IV Continuous <Continuous>    MEDICATIONS  (PRN):  acetaminophen     Tablet .. 975 milliGRAM(s) Oral every 6 hours PRN Temp greater or equal to 38C (100.4F), Mild Pain (1 - 3), Moderate Pain (4 - 6)  aluminum hydroxide/magnesium hydroxide/simethicone Suspension 30 milliLiter(s) Oral every 6 hours PRN Dyspepsia  melatonin 3 milliGRAM(s) Oral at bedtime PRN Insomnia  ondansetron Injectable 4 milliGRAM(s) IV Push every 6 hours PRN Nausea and/or Vomiting      ALLERGIES:  Allergies    No Known Allergies    Intolerances        LABS:                        13.4   7.32  )-----------( 200      ( 13 Oct 2022 06:19 )             39.1     10-12    143  |  112<H>  |  28<H>  ----------------------------<  123<H>  3.7   |  20<L>  |  4.25<H>    Ca    15.6<HH>      12 Oct 2022 18:38  Phos  3.9     10-12  Mg     1.5     10-12    TPro  6.1  /  Alb  3.1<L>  /  TBili  0.6  /  DBili  x   /  AST  13  /  ALT  10  /  AlkPhos  87  10-12    RADIOLOGY & ADDITIONAL TESTS: Reviewed.  CXR 10/12:  Limited examination secondary to rotation/patient positioning. Cannot   exclude left lower lung infiltrates on this exam. No left upper lung or   right lung infiltrate or consolidation. No pleural effusion. No   pneumothorax.    NM PET Scan 10/12:   1. Focal uptake corresponding to a 3.2 cm nodule at the aortopulmonary   region suggestive of an ectopic parathyroid adenoma within the   mediastinum. Uptake is somewhat lower than expected, although the dose of   Cardiolite given was lower than planned due to delays in patient   transport.  2. Diffuse uptake in the thyroid gland. Subtle parathyroid hyperplasia or   adenomas at the thyroid cannot be excluded.    US Thyroid 10/13:   No parathyroid adenoma is seen. OVERNIGHT EVENTS: Fever 101.3 F, given tylenol    SUBJECTIVE / INTERVAL HPI: Patient seen and examined at bedside. Appeared fatigued, but able to answer questions. No chest pain or trouble breathing. Still feels weak, otherwise no complaints.     VITAL SIGNS:  Vital Signs Last 24 Hrs  T(C): 37.8 (13 Oct 2022 04:42), Max: 38.5 (12 Oct 2022 20:32)  T(F): 100.1 (13 Oct 2022 04:42), Max: 101.3 (12 Oct 2022 20:32)  HR: 90 (12 Oct 2022 23:58) (86 - 101)  BP: 172/85 (12 Oct 2022 23:58) (134/67 - 178/81)  BP(mean): 120 (12 Oct 2022 23:58) (93 - 126)  RR: 35 (12 Oct 2022 23:58) (35 - 48)  SpO2: 95% (12 Oct 2022 23:58) (90% - 95%)    Parameters below as of 12 Oct 2022 23:58  Patient On (Oxygen Delivery Method): nasal cannula  O2 Flow (L/min): 2      PHYSICAL EXAM:  General: NAD, fatigued  HEENT: NCAT  Neck: supple, trachea midline  Cardiovascular: S1, S2 normal; RRR, no M/G/R  Respiratory: CTABL; no W/R/R  Gastrointestinal: soft. no longer tender, ND. no rebound or guarding.   Skin: no ulcerations or visible rashes appreciated  Extremities: WWP; mild 1+ edema UE & LEs b/l. No clubbing or cyanosis  Vascular: 2+ DP pulses.   Neurological: AAOx1-2; lethargic, CN II-XII grossly intact; no focal deficits    MEDICATIONS:  MEDICATIONS  (STANDING):  amLODIPine   Tablet 5 milliGRAM(s) Oral daily  atorvastatin 80 milliGRAM(s) Oral at bedtime  heparin   Injectable 5000 Unit(s) SubCutaneous every 8 hours  levothyroxine Injectable 20 MICROGram(s) IV Push <User Schedule>  piperacillin/tazobactam IVPB.. 4.5 Gram(s) IV Intermittent every 12 hours  sodium chloride 0.9%. 1000 milliLiter(s) (1000 mL/Hr) IV Continuous <Continuous>  sodium chloride 0.9%. 1000 milliLiter(s) (200 mL/Hr) IV Continuous <Continuous>    MEDICATIONS  (PRN):  acetaminophen     Tablet .. 975 milliGRAM(s) Oral every 6 hours PRN Temp greater or equal to 38C (100.4F), Mild Pain (1 - 3), Moderate Pain (4 - 6)  aluminum hydroxide/magnesium hydroxide/simethicone Suspension 30 milliLiter(s) Oral every 6 hours PRN Dyspepsia  melatonin 3 milliGRAM(s) Oral at bedtime PRN Insomnia  ondansetron Injectable 4 milliGRAM(s) IV Push every 6 hours PRN Nausea and/or Vomiting      ALLERGIES:  Allergies    No Known Allergies    Intolerances        LABS:                        13.4   7.32  )-----------( 200      ( 13 Oct 2022 06:19 )             39.1     10-12    143  |  112<H>  |  28<H>  ----------------------------<  123<H>  3.7   |  20<L>  |  4.25<H>    Ca    15.6<HH>      12 Oct 2022 18:38  Phos  3.9     10-12  Mg     1.5     10-12    TPro  6.1  /  Alb  3.1<L>  /  TBili  0.6  /  DBili  x   /  AST  13  /  ALT  10  /  AlkPhos  87  10-12    RADIOLOGY & ADDITIONAL TESTS: Reviewed.  CXR 10/12:  Limited examination secondary to rotation/patient positioning. Cannot   exclude left lower lung infiltrates on this exam. No left upper lung or   right lung infiltrate or consolidation. No pleural effusion. No   pneumothorax.    NM PET Scan 10/12:   1. Focal uptake corresponding to a 3.2 cm nodule at the aortopulmonary   region suggestive of an ectopic parathyroid adenoma within the   mediastinum. Uptake is somewhat lower than expected, although the dose of   Cardiolite given was lower than planned due to delays in patient   transport.  2. Diffuse uptake in the thyroid gland. Subtle parathyroid hyperplasia or   adenomas at the thyroid cannot be excluded.    US Thyroid 10/13:   No parathyroid adenoma is seen.

## 2022-10-13 NOTE — PROGRESS NOTE ADULT - PROBLEM SELECTOR PLAN 11
F: NS @ 250cc/hr  E: Replete as necessary  DVT PPx: Heparin 5000u q8h  Dispo: [facility] F: NS @ 200cc/hr  E: Replete as necessary  DVT PPx: Heparin 5000u q8h  Dispo: [facility]

## 2022-10-13 NOTE — CONSULT NOTE ADULT - ASSESSMENT
51 y/o male w/ PMHx of HTN, T2DM, HLD, hypothyroidism, CVA (most recently admitted ~4w ago, admitted at [facility], R sided weakness, slurred speech) presents here with 3-4d of multiple NBNB nausea and vomiting. In the ED, he was found to have a calcium of 17.1. He says he hasn't been able to keep down any liquids/food.  Also, per the son over the past few weeks he has seemed more lethargic and not himself.  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  He takes Lisinopril-HCTZ daily for HTN.  He denies recent tums or calcium supplementation.  Most recent Calcium 9/3/22 at [facility] was 11.  No other systemic symptoms. Denies HA, confusion, fever/chills, cough, sore throat, SOB, CP, abd pain, urinary symptoms, new weakness/numbness, black/bloody stool, rashes.  Since admission, found to have ARPITA and a 3.2cm nodule at the aortopulmonary region suggestive of an ectopic parathyroid adenoma within the mediastinum. Diffuse uptake in the thyroid gland.  We were consulted for surgical evaluation.      Plan:  Problem 1: Mediastinal mass:  For OR tomorrow with Dr. Medina and Dr. Ugalde for mediastinal mass excision, possible sternotomy.  Cardiology cleared for surgery, echo done and reviewed.  Will F/U on T&S, and COVID status.       Problem 2: Diabetes: Recommend ISS.  Blood glucose management per primary team.      Problem 3: HTN.  BP on the high side.  Will defer to primary team.      Problem 4.  Thyroid disease, on Synthroid.        Problem 4:    I have reviewed clinical labs tests and reports, radiology tests and reports, as well as old patient medical records, and discussed with the refering physician.      53 y/o male w/ PMHx of HTN, T2DM, HLD, hypothyroidism, CVA (most recently admitted ~4w ago, admitted at [facility], R sided weakness, slurred speech) presents here with 3-4d of multiple NBNB nausea and vomiting. In the ED, he was found to have a calcium of 17.1. He says he hasn't been able to keep down any liquids/food.  Also, per the son over the past few weeks he has seemed more lethargic and not himself.  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  He takes Lisinopril-HCTZ daily for HTN.  He denies recent tums or calcium supplementation.  Most recent Calcium 9/3/22 at [facility] was 11.  No other systemic symptoms. Denies HA, confusion, fever/chills, cough, sore throat, SOB, CP, abd pain, urinary symptoms, new weakness/numbness, black/bloody stool, rashes.  Since admission, found to have ARPITA and a 3.2cm nodule at the aortopulmonary region suggestive of an ectopic parathyroid adenoma within the mediastinum. Diffuse uptake in the thyroid gland.  We were consulted for surgical evaluation.      Plan:  Problem 1: Mediastinal mass:  For OR tomorrow with Dr. Medina and Dr. Ugalde for mediastinal mass excision, possible sternotomy.  Cardiology cleared for surgery, echo done and reviewed.  Will F/U on T&S, and COVID status.       Problem 2: Diabetes: Recommend ISS.  Blood glucose management per primary team.      Problem 3: HTN.  BP on the high side.  Will defer to primary team.      Problem 4.  Thyroid disease, on Synthroid.        Problem 5: Kidney disease.  ?Acute on chronic, vs acute.  Renal following.  Per their note, may need temporary HD.      I have reviewed clinical labs tests and reports, radiology tests and reports, as well as old patient medical records, and discussed with the refering physician.      51 y/o male w/ PMHx of HTN, T2DM, HLD, hypothyroidism, CVA (most recently admitted ~4w ago, admitted at [facility], R sided weakness, slurred speech) presents here with 3-4d of multiple NBNB nausea and vomiting. In the ED, he was found to have a calcium of 17.1. He says he hasn't been able to keep down any liquids/food.  Also, per the son over the past few weeks he has seemed more lethargic and not himself.  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  He takes Lisinopril-HCTZ daily for HTN.  He denies recent tums or calcium supplementation.  Most recent Calcium 9/3/22 at [facility] was 11.  No other systemic symptoms. Denies HA, confusion, fever/chills, cough, sore throat, SOB, CP, abd pain, urinary symptoms, new weakness/numbness, black/bloody stool, rashes.  Since admission, found to have ARPITA and a 3.2cm nodule at the aortopulmonary region suggestive of an ectopic parathyroid adenoma within the mediastinum. Diffuse uptake in the thyroid gland.  We were consulted for surgical evaluation.      Plan:  Problem 1: Mediastinal mass  - was planned for OR tomorrow for extensive surgery for excision of mediastinal mass including possible cardiopulmonary bypass, now postponed as patient is in need of further workup  - Additionally, last dose of eliquis was on 10/11 PM, needs to be held for 72 hrs prior to OR per hematology   - Please consult psychiatry to evaluate for capacity for patient, per  family patient's son signs consents however no documentation of this  - Please obtain PT evaluation and plan to optimize patient (ambulation, OOBTC, chest PT) prior to planned OR    Problem 2: Recent CVA  - Per chart has CVA ~4 weeks ago (which is why he takes eliquis)  - Please consult stroke (vascular) neurology  - Likely will need CTH and further workup prior to plan for OR and possible full heparinization for CPB    Problem 3: Diabetes: Recommend ISS.  Blood glucose management per primary team.      Problem 4: HTN.  BP on the high side.  Will defer to primary team.      Problem 5:  Thyroid disease, on Synthroid.        Problem 6: Kidney disease.  ?Acute on chronic, vs acute.  Renal following.  Per their note, may need temporary HD.      I have reviewed clinical labs tests and reports, radiology tests and reports, as well as old patient medical records, and discussed with the refering physician.      51 y/o male w/ PMHx of HTN, T2DM, HLD, hypothyroidism, CVA (most recently admitted ~4w ago, admitted at [facility], R sided weakness, slurred speech) presents here with 3-4d of multiple NBNB nausea and vomiting. In the ED, he was found to have a calcium of 17.1. He says he hasn't been able to keep down any liquids/food.  Also, per the son over the past few weeks he has seemed more lethargic and not himself.  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  He takes Lisinopril-HCTZ daily for HTN.  He denies recent tums or calcium supplementation.  Most recent Calcium 9/3/22 at [facility] was 11.  No other systemic symptoms. Denies HA, confusion, fever/chills, cough, sore throat, SOB, CP, abd pain, urinary symptoms, new weakness/numbness, black/bloody stool, rashes.  Since admission, found to have ARPITA and a 3.2cm nodule at the aortopulmonary region suggestive of an ectopic parathyroid adenoma within the mediastinum. Diffuse uptake in the thyroid gland.  We were consulted for surgical evaluation.      Plan:  Problem 1: Mediastinal mass  - was planned for OR tomorrow for extensive surgery for excision of mediastinal mass including possible cardiopulmonary bypass, now postponed as patient is in need of further workup  - Additionally, last dose of eliquis was on 10/11 PM, needs to be held for 72 hrs prior to OR per hematology   - If need for AC please start heparin gtt  - Please consult psychiatry to evaluate for capacity for patient, per  family patient's son signs consents however no documentation of this  - Please obtain PT evaluation and plan to optimize patient (ambulation, OOBTC, chest PT) prior to planned OR    Problem 2: Recent CVA  - Per chart has CVA ~4 weeks ago (which is why he takes eliquis)  - Please consult stroke (vascular) neurology  - Likely will need CTH and further workup prior to plan for OR and possible full heparinization for CPB    Problem 3: Diabetes: Recommend ISS.  Blood glucose management per primary team.      Problem 4: HTN.  BP on the high side.  Will defer to primary team.      Problem 5:  Thyroid disease, on Synthroid.        Problem 6: Kidney disease.  ?Acute on chronic, vs acute.  Renal following.  Per their note, may need temporary HD.      I have reviewed clinical labs tests and reports, radiology tests and reports, as well as old patient medical records, and discussed with the refering physician.

## 2022-10-13 NOTE — CONSULT NOTE ADULT - SUBJECTIVE AND OBJECTIVE BOX
Hematology Consult Note    HPI:  51 yo M w/ PMHx HTN, T2DM, HLD, Hypothyroidism, CVA (most recently admitted ~4w ago, admitted at [facility], R weakness, slurred speech) presents with 3-4d of multiple NBNB nausea and vomiting. In the ED, he was found to have a calcium of 17.1. He says he hasn't been able to keep down any liquids/food. Also, per the son over the past few weeks he has seemed more lethargic and not himself. 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 He takes Lisinopril-HCTZ daily for HTN. He denies recent tums or calcium supplementation. Most recent Calcium 9/3 at [facility] was 11. No other systemic symptoms. Denies HA, confusion, f/c, cough, sore throat, SOB, CP, abd pain, urinary symptoms, new weakness/numbness, black/bloody stool, rashes.    In the ED:  Initial vital signs: T: 98.9 degrees F, HR: 59, BP: 175/111, R: 18, SpO2: 96% on RA  Labs: significant for WBC 12.27, Hgb 17.3, hypercalcemia 17.1, BUN 25, Cr 3.34,   Imaging: US Abd :No cholelithiasis or cholecystitis.  EKG: Sinus rhythm w/ PVCs, normal QTc  Medications: 2L NS Bolus, Zofran 4mg   Consults: none  (09 Oct 2022 01:41)    Hematology consulted for the need for anticoagulation reversal for an upcoming surgical procedure. This patient has been on Eliquis 2.5mg BID, having received the last dose on 10/11/22 at 9 pm. He has a pending surgical procedure (date not yet finalized).    Allergies    No Known Allergies    Intolerances        MEDICATIONS  (STANDING):  amLODIPine   Tablet 5 milliGRAM(s) Oral daily  atorvastatin 80 milliGRAM(s) Oral at bedtime  heparin   Injectable 5000 Unit(s) SubCutaneous every 8 hours  levothyroxine Injectable 20 MICROGram(s) IV Push <User Schedule>  piperacillin/tazobactam IVPB.. 4.5 Gram(s) IV Intermittent every 12 hours  sodium chloride 0.9%. 1000 milliLiter(s) (200 mL/Hr) IV Continuous <Continuous>    MEDICATIONS  (PRN):  acetaminophen     Tablet .. 975 milliGRAM(s) Oral every 6 hours PRN Temp greater or equal to 38C (100.4F), Mild Pain (1 - 3), Moderate Pain (4 - 6)  aluminum hydroxide/magnesium hydroxide/simethicone Suspension 30 milliLiter(s) Oral every 6 hours PRN Dyspepsia  melatonin 3 milliGRAM(s) Oral at bedtime PRN Insomnia  ondansetron Injectable 4 milliGRAM(s) IV Push every 6 hours PRN Nausea and/or Vomiting      PAST MEDICAL & SURGICAL HISTORY:  Stroke  HTN (hypertension)  Diabetes  Hypothyroidism  Hyperlipidemia    FAMILY HISTORY:      SOCIAL HISTORY: No EtOH, no tobacco    REVIEW OF SYSTEMS:    CONSTITUTIONAL: No weakness, fevers or chills  EYES/ENT: No visual changes;  No vertigo or throat pain   NECK: No pain or stiffness  RESPIRATORY: No cough, wheezing, hemoptysis; No shortness of breath  CARDIOVASCULAR: No chest pain or palpitations  GASTROINTESTINAL: No abdominal or epigastric pain. No nausea, vomiting, or hematemesis; No diarrhea or constipation. No melena or hematochezia.  GENITOURINARY: No dysuria, frequency or hematuria  NEUROLOGICAL: No numbness or weakness  SKIN: No itching, burning, rashes, or lesions   All other review of systems is negative unless indicated above.        T(F): 99.2 (10-13-22 @ 09:44), Max: 101.3 (10-12-22 @ 20:32)  HR: 78 (10-13-22 @ 08:40)  BP: 175/93 (10-13-22 @ 08:40)  RR: 26 (10-13-22 @ 08:40)  SpO2: 93% (10-13-22 @ 08:40)  Wt(kg): --    GENERAL: NAD, well-developed  HEAD:  Atraumatic, Normocephalic  EYES: EOMI, PERRLA, conjunctiva and sclera clear  NECK: Supple, No JVD  CHEST/LUNG: Clear to auscultation bilaterally; No wheeze  HEART: Regular rate and rhythm; No murmurs, rubs, or gallops  ABDOMEN: Soft, Nontender, Nondistended; Bowel sounds present  EXTREMITIES:  2+ Peripheral Pulses, No clubbing, cyanosis, or edema  NEUROLOGY: non-focal  SKIN: No rashes or lesions                          13.4   7.32  )-----------( 200      ( 13 Oct 2022 06:19 )             39.1       10-13    140  |  114<H>  |  28<H>  ----------------------------<  124<H>  3.4<L>   |  18<L>  |  4.35<H>    Ca    13.8<HH>      13 Oct 2022 06:19  Phos  3.2     10-13  Mg     2.3     10-13    TPro  5.2<L>  /  Alb  2.5<L>  /  TBili  0.4  /  DBili  x   /  AST  20  /  ALT  12  /  AlkPhos  71  10-13      Magnesium, Serum: 2.3 mg/dL (10-13 @ 06:19)  Phosphorus Level, Serum: 3.2 mg/dL (10-13 @ 06:19)       Hematology Consult Note    HPI:  53 yo M w/ PMHx HTN, T2DM, HLD, Hypothyroidism, CVA (most recently admitted ~4w ago, admitted at [facility], R weakness, slurred speech) presents with 3-4d of multiple NBNB nausea and vomiting. In the ED, he was found to have a calcium of 17.1. He says he hasn't been able to keep down any liquids/food. Also, per the son over the past few weeks he has seemed more lethargic and not himself. 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 He takes Lisinopril-HCTZ daily for HTN. He denies recent tums or calcium supplementation. Most recent Calcium 9/3 at [facility] was 11. No other systemic symptoms. Denies HA, confusion, f/c, cough, sore throat, SOB, CP, abd pain, urinary symptoms, new weakness/numbness, black/bloody stool, rashes.    In the ED:  Initial vital signs: T: 98.9 degrees F, HR: 59, BP: 175/111, R: 18, SpO2: 96% on RA  Labs: significant for WBC 12.27, Hgb 17.3, hypercalcemia 17.1, BUN 25, Cr 3.34,   Imaging: US Abd :No cholelithiasis or cholecystitis.  EKG: Sinus rhythm w/ PVCs, normal QTc  Medications: 2L NS Bolus, Zofran 4mg   Consults: none  (09 Oct 2022 01:41)    Hematology consulted for the need for anticoagulation reversal for planned parathyroidectomy with exploration of mediastinum. This patient has been on Eliquis 2.5mg BID, having received the last dose on 10/11/22 at 9 pm.    Allergies    No Known Allergies    Intolerances        MEDICATIONS  (STANDING):  amLODIPine   Tablet 5 milliGRAM(s) Oral daily  atorvastatin 80 milliGRAM(s) Oral at bedtime  heparin   Injectable 5000 Unit(s) SubCutaneous every 8 hours  levothyroxine Injectable 20 MICROGram(s) IV Push <User Schedule>  piperacillin/tazobactam IVPB.. 4.5 Gram(s) IV Intermittent every 12 hours  sodium chloride 0.9%. 1000 milliLiter(s) (200 mL/Hr) IV Continuous <Continuous>    MEDICATIONS  (PRN):  acetaminophen     Tablet .. 975 milliGRAM(s) Oral every 6 hours PRN Temp greater or equal to 38C (100.4F), Mild Pain (1 - 3), Moderate Pain (4 - 6)  aluminum hydroxide/magnesium hydroxide/simethicone Suspension 30 milliLiter(s) Oral every 6 hours PRN Dyspepsia  melatonin 3 milliGRAM(s) Oral at bedtime PRN Insomnia  ondansetron Injectable 4 milliGRAM(s) IV Push every 6 hours PRN Nausea and/or Vomiting      PAST MEDICAL & SURGICAL HISTORY:  Stroke  HTN (hypertension)  Diabetes  Hypothyroidism  Hyperlipidemia    FAMILY HISTORY:      SOCIAL HISTORY: No EtOH, no tobacco    REVIEW OF SYSTEMS:    CONSTITUTIONAL: No weakness, fevers or chills  EYES/ENT: No visual changes;  No vertigo or throat pain   NECK: No pain or stiffness  RESPIRATORY: No cough, wheezing, hemoptysis; No shortness of breath  CARDIOVASCULAR: No chest pain or palpitations  GASTROINTESTINAL: No abdominal or epigastric pain. No nausea, vomiting, or hematemesis; No diarrhea or constipation. No melena or hematochezia.  GENITOURINARY: No dysuria, frequency or hematuria  NEUROLOGICAL: No numbness or weakness  SKIN: No itching, burning, rashes, or lesions   All other review of systems is negative unless indicated above.        T(F): 99.2 (10-13-22 @ 09:44), Max: 101.3 (10-12-22 @ 20:32)  HR: 78 (10-13-22 @ 08:40)  BP: 175/93 (10-13-22 @ 08:40)  RR: 26 (10-13-22 @ 08:40)  SpO2: 93% (10-13-22 @ 08:40)  Wt(kg): --    GENERAL: NAD, reclining in bed  HEAD:  Atraumatic, Normocephalic  EYES: EOMI, PERRLA, conjunctiva and sclera clear  ENT: no oropharyngeal erythema or exudates  NECK: Supple  CHEST/LUNG: Clear to auscultation bilaterally; No wheeze  HEART: Regular rate and rhythm; No murmurs, rubs, or gallops  ABDOMEN: Soft, Nontender, Nondistended; Bowel sounds present  EXTREMITIES:  2+ Peripheral Pulses, No clubbing, cyanosis, or edema  NEUROLOGY: non-focal  SKIN: No rashes or lesions                          13.4   7.32  )-----------( 200      ( 13 Oct 2022 06:19 )             39.1       10-13    140  |  114<H>  |  28<H>  ----------------------------<  124<H>  3.4<L>   |  18<L>  |  4.35<H>    Ca    13.8<HH>      13 Oct 2022 06:19  Phos  3.2     10-13  Mg     2.3     10-13    TPro  5.2<L>  /  Alb  2.5<L>  /  TBili  0.4  /  DBili  x   /  AST  20  /  ALT  12  /  AlkPhos  71  10-13      Magnesium, Serum: 2.3 mg/dL (10-13 @ 06:19)  Phosphorus Level, Serum: 3.2 mg/dL (10-13 @ 06:19)

## 2022-10-13 NOTE — PROGRESS NOTE ADULT - PROBLEM SELECTOR PLAN 7
Patient takes Amlodipine 5mg qd and Lisinopril-hydrochlorothiazide 20-12.5 mg qd.    - holding Lisinopril-HCTZ as can worsen hypercalcemia  - continue amlodipine 5mg qd

## 2022-10-13 NOTE — PROGRESS NOTE ADULT - PROBLEM SELECTOR PLAN 1
Likely 2/2 hyperparathyroidism   Patient presenting w/ calcium of 17.1. Last Ca 9/3 11 at [facility].   - s/p 1826u calcitonin total, s/p lasix 40mg IVP x1, & IVF  - , Vit D, 25 40.8, Vit D125: 27.5. Ionized Calcium 8.0  - 10/11 Ca: 17 despite calcitonin --> gave Pamidronate 60mg IVP x1    - Continue q12h BMP, on NS 200cc/hr, strict I/Os  - f/u Thyroid US, PET CT, & MRI thoracic spine  - Renal, Endo, ENT following. Per renal, ok to give lasix 40mg x1 once patient volume repleted  - f/u serum PTH-related peptide & paraneoplastic autoantibody evaluation Likely 2/2 hyperparathyroidism   Patient presenting w/ calcium of 17.1. Last Ca 9/3 11 at [facility].   - s/p 1826u calcitonin total, lasix 40mg IVP x1, pamidronate 60mg IVP x1, & IVF  - , Vit D, 25 40.8, Vit D125: 27.5. Ionized Calcium 8.0  - NM PET CT 10/12: 3.2 cm nodule suggestive of an ectopic parathyroid adenoma within mediastinum. Subtle parathyroid hyperplasia or adenomas at the thyroid cannot be excluded.  - US Thyroid 10/13: No parathyroid adenoma is seen.    - Continue q12h BMP, on NS 200cc/hr, strict I/Os  - Renal, Endo, ENT following. Per renal, ok to give lasix 40mg x1 once patient volume repleted  - f/u serum PTH-related peptide & paraneoplastic autoantibody evaluation

## 2022-10-14 LAB
% ALBUMIN: 54.5 % — SIGNIFICANT CHANGE UP
% ALPHA 1: 5.5 % — SIGNIFICANT CHANGE UP
% ALPHA 2: 14.2 % — SIGNIFICANT CHANGE UP
% BETA: 12.8 % — SIGNIFICANT CHANGE UP
% GAMMA: 13 % — SIGNIFICANT CHANGE UP
ALBUMIN SERPL ELPH-MCNC: 2.6 G/DL — LOW (ref 3.3–5)
ALBUMIN SERPL ELPH-MCNC: 3.2 G/DL — LOW (ref 3.6–5.5)
ALBUMIN/GLOB SERPL ELPH: 1.2 RATIO — SIGNIFICANT CHANGE UP
ALP SERPL-CCNC: 68 U/L — SIGNIFICANT CHANGE UP (ref 40–120)
ALPHA1 GLOB SERPL ELPH-MCNC: 0.3 G/DL — SIGNIFICANT CHANGE UP (ref 0.1–0.4)
ALPHA2 GLOB SERPL ELPH-MCNC: 0.8 G/DL — SIGNIFICANT CHANGE UP (ref 0.5–1)
ALT FLD-CCNC: 13 U/L — SIGNIFICANT CHANGE UP (ref 10–45)
ANION GAP SERPL CALC-SCNC: 9 MMOL/L — SIGNIFICANT CHANGE UP (ref 5–17)
APTT BLD: 32.1 SEC — SIGNIFICANT CHANGE UP (ref 27.5–35.5)
AST SERPL-CCNC: 19 U/L — SIGNIFICANT CHANGE UP (ref 10–40)
B-GLOBULIN SERPL ELPH-MCNC: 0.8 G/DL — SIGNIFICANT CHANGE UP (ref 0.5–1)
BASOPHILS # BLD AUTO: 0.03 K/UL — SIGNIFICANT CHANGE UP (ref 0–0.2)
BASOPHILS NFR BLD AUTO: 0.3 % — SIGNIFICANT CHANGE UP (ref 0–2)
BILIRUB SERPL-MCNC: 0.4 MG/DL — SIGNIFICANT CHANGE UP (ref 0.2–1.2)
BLD GP AB SCN SERPL QL: NEGATIVE — SIGNIFICANT CHANGE UP
BUN SERPL-MCNC: 28 MG/DL — HIGH (ref 7–23)
CALCIUM SERPL-MCNC: 12.8 MG/DL — HIGH (ref 8.4–10.5)
CHLORIDE SERPL-SCNC: 111 MMOL/L — HIGH (ref 96–108)
CO2 SERPL-SCNC: 18 MMOL/L — LOW (ref 22–31)
CREAT ?TM UR-MCNC: 86 MG/DL — SIGNIFICANT CHANGE UP
CREAT SERPL-MCNC: 4.43 MG/DL — HIGH (ref 0.5–1.3)
EGFR: 15 ML/MIN/1.73M2 — LOW
EOSINOPHIL # BLD AUTO: 0.28 K/UL — SIGNIFICANT CHANGE UP (ref 0–0.5)
EOSINOPHIL NFR BLD AUTO: 3.2 % — SIGNIFICANT CHANGE UP (ref 0–6)
GAMMA GLOBULIN: 0.8 G/DL — SIGNIFICANT CHANGE UP (ref 0.6–1.6)
GLUCOSE SERPL-MCNC: 95 MG/DL — SIGNIFICANT CHANGE UP (ref 70–99)
HCT VFR BLD CALC: 39.1 % — SIGNIFICANT CHANGE UP (ref 39–50)
HGB BLD-MCNC: 13.4 G/DL — SIGNIFICANT CHANGE UP (ref 13–17)
IMM GRANULOCYTES NFR BLD AUTO: 0.3 % — SIGNIFICANT CHANGE UP (ref 0–0.9)
INR BLD: 1.51 — HIGH (ref 0.88–1.16)
LYMPHOCYTES # BLD AUTO: 1.04 K/UL — SIGNIFICANT CHANGE UP (ref 1–3.3)
LYMPHOCYTES # BLD AUTO: 12 % — LOW (ref 13–44)
MAGNESIUM SERPL-MCNC: 1.8 MG/DL — SIGNIFICANT CHANGE UP (ref 1.6–2.6)
MCHC RBC-ENTMCNC: 30 PG — SIGNIFICANT CHANGE UP (ref 27–34)
MCHC RBC-ENTMCNC: 34.3 GM/DL — SIGNIFICANT CHANGE UP (ref 32–36)
MCV RBC AUTO: 87.5 FL — SIGNIFICANT CHANGE UP (ref 80–100)
MONOCYTES # BLD AUTO: 1.01 K/UL — HIGH (ref 0–0.9)
MONOCYTES NFR BLD AUTO: 11.6 % — SIGNIFICANT CHANGE UP (ref 2–14)
NEUTROPHILS # BLD AUTO: 6.29 K/UL — SIGNIFICANT CHANGE UP (ref 1.8–7.4)
NEUTROPHILS NFR BLD AUTO: 72.6 % — SIGNIFICANT CHANGE UP (ref 43–77)
NRBC # BLD: 0 /100 WBCS — SIGNIFICANT CHANGE UP (ref 0–0)
PHOSPHATE SERPL-MCNC: 2.7 MG/DL — SIGNIFICANT CHANGE UP (ref 2.5–4.5)
PLATELET # BLD AUTO: 202 K/UL — SIGNIFICANT CHANGE UP (ref 150–400)
POTASSIUM SERPL-MCNC: 3.7 MMOL/L — SIGNIFICANT CHANGE UP (ref 3.5–5.3)
POTASSIUM SERPL-SCNC: 3.7 MMOL/L — SIGNIFICANT CHANGE UP (ref 3.5–5.3)
PROT ?TM UR-MCNC: 196 MG/DL — HIGH (ref 0–12)
PROT PATTERN SERPL ELPH-IMP: SIGNIFICANT CHANGE UP
PROT SERPL-MCNC: 5.4 G/DL — LOW (ref 6–8.3)
PROT/CREAT UR-RTO: 2.3 RATIO — HIGH (ref 0–0.2)
PROTHROM AB SERPL-ACNC: 18.1 SEC — HIGH (ref 10.5–13.4)
RBC # BLD: 4.47 M/UL — SIGNIFICANT CHANGE UP (ref 4.2–5.8)
RBC # FLD: 13.5 % — SIGNIFICANT CHANGE UP (ref 10.3–14.5)
RH IG SCN BLD-IMP: POSITIVE — SIGNIFICANT CHANGE UP
SODIUM SERPL-SCNC: 138 MMOL/L — SIGNIFICANT CHANGE UP (ref 135–145)
VANCOMYCIN TROUGH SERPL-MCNC: 25.6 UG/ML — CRITICAL HIGH (ref 10–20)
WBC # BLD: 8.68 K/UL — SIGNIFICANT CHANGE UP (ref 3.8–10.5)
WBC # FLD AUTO: 8.68 K/UL — SIGNIFICANT CHANGE UP (ref 3.8–10.5)

## 2022-10-14 PROCEDURE — 70450 CT HEAD/BRAIN W/O DYE: CPT | Mod: 26

## 2022-10-14 PROCEDURE — 99231 SBSQ HOSP IP/OBS SF/LOW 25: CPT

## 2022-10-14 PROCEDURE — 99233 SBSQ HOSP IP/OBS HIGH 50: CPT | Mod: GC

## 2022-10-14 PROCEDURE — 99222 1ST HOSP IP/OBS MODERATE 55: CPT

## 2022-10-14 RX ORDER — POLYETHYLENE GLYCOL 3350 17 G/17G
17 POWDER, FOR SOLUTION ORAL DAILY
Refills: 0 | Status: DISCONTINUED | OUTPATIENT
Start: 2022-10-14 | End: 2022-10-15

## 2022-10-14 RX ORDER — AMLODIPINE BESYLATE 2.5 MG/1
10 TABLET ORAL EVERY 24 HOURS
Refills: 0 | Status: DISCONTINUED | OUTPATIENT
Start: 2022-10-15 | End: 2022-10-15

## 2022-10-14 RX ORDER — SODIUM CHLORIDE 9 MG/ML
1000 INJECTION INTRAMUSCULAR; INTRAVENOUS; SUBCUTANEOUS
Refills: 0 | Status: DISCONTINUED | OUTPATIENT
Start: 2022-10-14 | End: 2022-10-14

## 2022-10-14 RX ORDER — HYDRALAZINE HCL 50 MG
5 TABLET ORAL ONCE
Refills: 0 | Status: COMPLETED | OUTPATIENT
Start: 2022-10-14 | End: 2022-10-14

## 2022-10-14 RX ORDER — SENNA PLUS 8.6 MG/1
2 TABLET ORAL AT BEDTIME
Refills: 0 | Status: DISCONTINUED | OUTPATIENT
Start: 2022-10-14 | End: 2022-10-15

## 2022-10-14 RX ORDER — SODIUM CHLORIDE 9 MG/ML
1000 INJECTION INTRAMUSCULAR; INTRAVENOUS; SUBCUTANEOUS
Refills: 0 | Status: DISCONTINUED | OUTPATIENT
Start: 2022-10-14 | End: 2022-10-15

## 2022-10-14 RX ORDER — POTASSIUM CHLORIDE 20 MEQ
20 PACKET (EA) ORAL ONCE
Refills: 0 | Status: COMPLETED | OUTPATIENT
Start: 2022-10-14 | End: 2022-10-14

## 2022-10-14 RX ORDER — MAGNESIUM SULFATE 500 MG/ML
2 VIAL (ML) INJECTION ONCE
Refills: 0 | Status: COMPLETED | OUTPATIENT
Start: 2022-10-14 | End: 2022-10-14

## 2022-10-14 RX ADMIN — Medication 3 MILLIGRAM(S): at 00:08

## 2022-10-14 RX ADMIN — SODIUM CHLORIDE 200 MILLILITER(S): 9 INJECTION INTRAMUSCULAR; INTRAVENOUS; SUBCUTANEOUS at 15:00

## 2022-10-14 RX ADMIN — ATORVASTATIN CALCIUM 80 MILLIGRAM(S): 80 TABLET, FILM COATED ORAL at 21:25

## 2022-10-14 RX ADMIN — Medication 5 MILLIGRAM(S): at 21:25

## 2022-10-14 RX ADMIN — Medication 20 MICROGRAM(S): at 06:42

## 2022-10-14 RX ADMIN — POLYETHYLENE GLYCOL 3350 17 GRAM(S): 17 POWDER, FOR SOLUTION ORAL at 12:39

## 2022-10-14 RX ADMIN — HEPARIN SODIUM 5000 UNIT(S): 5000 INJECTION INTRAVENOUS; SUBCUTANEOUS at 15:09

## 2022-10-14 RX ADMIN — Medication 20 MILLIEQUIVALENT(S): at 10:45

## 2022-10-14 RX ADMIN — HEPARIN SODIUM 5000 UNIT(S): 5000 INJECTION INTRAVENOUS; SUBCUTANEOUS at 00:06

## 2022-10-14 RX ADMIN — PIPERACILLIN AND TAZOBACTAM 25 GRAM(S): 4; .5 INJECTION, POWDER, LYOPHILIZED, FOR SOLUTION INTRAVENOUS at 00:06

## 2022-10-14 RX ADMIN — SENNA PLUS 2 TABLET(S): 8.6 TABLET ORAL at 21:26

## 2022-10-14 RX ADMIN — Medication 25 GRAM(S): at 10:45

## 2022-10-14 RX ADMIN — HEPARIN SODIUM 5000 UNIT(S): 5000 INJECTION INTRAVENOUS; SUBCUTANEOUS at 07:12

## 2022-10-14 RX ADMIN — AMLODIPINE BESYLATE 5 MILLIGRAM(S): 2.5 TABLET ORAL at 05:41

## 2022-10-14 RX ADMIN — SODIUM CHLORIDE 200 MILLILITER(S): 9 INJECTION INTRAMUSCULAR; INTRAVENOUS; SUBCUTANEOUS at 22:44

## 2022-10-14 RX ADMIN — SODIUM CHLORIDE 200 MILLILITER(S): 9 INJECTION INTRAMUSCULAR; INTRAVENOUS; SUBCUTANEOUS at 20:27

## 2022-10-14 RX ADMIN — PIPERACILLIN AND TAZOBACTAM 25 GRAM(S): 4; .5 INJECTION, POWDER, LYOPHILIZED, FOR SOLUTION INTRAVENOUS at 12:38

## 2022-10-14 NOTE — DIETITIAN INITIAL EVALUATION ADULT - PERTINENT MEDS FT
MEDICATIONS  (STANDING):  amLODIPine   Tablet 5 milliGRAM(s) Oral daily  atorvastatin 80 milliGRAM(s) Oral at bedtime  heparin   Injectable 5000 Unit(s) SubCutaneous every 8 hours  levothyroxine Injectable 20 MICROGram(s) IV Push <User Schedule>  piperacillin/tazobactam IVPB.. 4.5 Gram(s) IV Intermittent every 12 hours  polyethylene glycol 3350 17 Gram(s) Oral daily  senna 2 Tablet(s) Oral at bedtime  sodium chloride 0.9%. 1000 milliLiter(s) (200 mL/Hr) IV Continuous <Continuous>    MEDICATIONS  (PRN):  acetaminophen     Tablet .. 975 milliGRAM(s) Oral every 6 hours PRN Temp greater or equal to 38C (100.4F), Mild Pain (1 - 3), Moderate Pain (4 - 6)  aluminum hydroxide/magnesium hydroxide/simethicone Suspension 30 milliLiter(s) Oral every 6 hours PRN Dyspepsia  melatonin 3 milliGRAM(s) Oral at bedtime PRN Insomnia  ondansetron Injectable 4 milliGRAM(s) IV Push every 6 hours PRN Nausea and/or Vomiting

## 2022-10-14 NOTE — PROGRESS NOTE ADULT - ASSESSMENT
Assessment and Plan:  52y Male PMH HTN, T2DM, HLD, Hypothyroidism, CVA 4 wks ago w/ residual R weakness and slurred speech admitted 10/9 for 3-4d of multiple NBNB nausea and vomiting found to have elevated Ca to 17, , vit d 40.8. Also with ARPITA Cr 3.89. Nuclear scan 10/12 reveals neoplastic process.  Plan for mediastinal mass resection, possible sternotomy, possible exploration of parathyroid gland next week.     Plan:   - OR next week for mediastinal mass resection - patient is consented   - Prior to OR obtain: Pre op labs: covid swab, bmp, cbc, coags,  Make NPO and hold heparin midnight   - we will follow along    [facility] at [phone] with any questions/concerns.    Renée Chua PA-C  10-14-22 @ 08:19

## 2022-10-14 NOTE — CONSULT NOTE ADULT - ASSESSMENT
This 52y male with PMH of HTN, T2DM, HLD, Hypothyroidism, stroke presenting with AMS. he was found to have hypercalcemia and pneumonia. He was treated and his mental status is improving. CTH was not significant for new lesions. However, the patient was found to have a mass in the chest which require considering any brain mets. Toxic/metabolic is most likely etiology, but seizure should be r/o, as well as a new stroke    Recommendations:  - MR brain w/wo if no contraindication  - Routine EEG   - Further work up depend on the results of these tests, and the nature of the chest mass ( to consider paraneoplastic profile if it was malignant)  - Medical management as per primary team       Thank you for sharing this patient with me; please do not hesitate to contact me in case of any question.

## 2022-10-14 NOTE — PROGRESS NOTE ADULT - PROBLEM SELECTOR PLAN 6
Per HIE: History of prior strokes in 2018, 2019. Home meds Xarelto, ASA 81mg, Atorvastatin 80mg, & Gabapentin for tingling/numbness  - switched xarelto to eliquis 2.5mg BID, per Renal recs, now switched to heparin 5000u subcu q8h    - c/w high intensity statin therapy  - heparin subcu to stop after midnight for OR tomorrow  - hold Gabapentin iso ARPITA Per HIE: History of prior strokes in 2018, 2019. Home meds Xarelto, ASA 81mg, Atorvastatin 80mg, & Gabapentin for tingling/numbness  - switched xarelto to eliquis 2.5mg BID, per Renal recs, now switched to heparin 5000u subcu q8h    - c/w high intensity statin therapy  - heparin subcu, will stop the MN before procedure next week  - hold Gabapentin iso ARPITA

## 2022-10-14 NOTE — DIETITIAN INITIAL EVALUATION ADULT - NSICDXPASTMEDICALHX_GEN_ALL_CORE_FT
PAST MEDICAL HISTORY:  Diabetes     HTN (hypertension)     Hyperlipidemia     Hypothyroidism     Stroke

## 2022-10-14 NOTE — DIETITIAN INITIAL EVALUATION ADULT - PERTINENT LABORATORY DATA
10-14    138  |  111<H>  |  28<H>  ----------------------------<  95  3.7   |  18<L>  |  4.43<H>    Ca    12.8<H>      14 Oct 2022 05:30  Phos  2.7     10-14  Mg     1.8     10-14    TPro  5.4<L>  /  Alb  2.6<L>  /  TBili  0.4  /  DBili  x   /  AST  19  /  ALT  13  /  AlkPhos  68  10-14  A1C with Estimated Average Glucose Result: 6.3 % (10-09-22 @ 05:30)

## 2022-10-14 NOTE — PROGRESS NOTE ADULT - PROBLEM SELECTOR PLAN 3
- pt frequently hypertensive to SBP 180s, given hydral 5mg intermittently, likely 2/2 IV fluids & holding pt's home BP meds  - 10/11 overnight: SBP reached 200s, was given 10 hydral --> SBP still elevated --> given 10 labetalol   - 10/12 AM: SBP decreased to 130s, with ~40% drop in MAP.   - pt appeared fatigued but more alert than yesterday    - continue to monitor

## 2022-10-14 NOTE — PROGRESS NOTE ADULT - PROBLEM SELECTOR PLAN 4
- BUN/Cr ratio: 25/3.34. Baseline cr 1.55 according to [facility] records  - Cr uptrending, 4.35 today  - FeUrea 100%, suggests intra-renal.  FeNa 5.2%, suggests post-renal, obstructive. No hydronephrosis on renal US.   - per Renal: Holding BP meds     Plan:  - BMP q12h   - f/u further renal recs

## 2022-10-14 NOTE — CONSULT NOTE ADULT - CONSULT REQUESTED DATE/TIME
10-Oct-2022 13:57
09-Oct-2022 14:02
11-Oct-2022 10:42
13-Oct-2022 12:16
13-Oct-2022 14:56
14-Oct-2022 19:31
13-Oct-2022 09:22

## 2022-10-14 NOTE — PROGRESS NOTE ADULT - ASSESSMENT
53 yo M w/ HTN, T2DM presented with nausea and vomiting. Admitted and managed for hypercalcemia. Nephrology consulted for ARPITA w/ sCr 3.34.    #Non-oliguric ARPITA  Pt with no known underlying CKD. Unknown BCr  Cr 3.34 on admission, seems to have plateued now, 4.43 today  UA with 100 protein and hematuria. Normal CK.  likely etiology volume depletion 2/2 to hypercalcemia, however FEUrea consistent with intrinsic etiology, unlikely iATN given no documented hypotensive episodes   No Hydronephrosis appreciated on abdominal sono      Plan:   Maintain net even to slightly positive fluid balance  Continue with IVF   Repeat UPCR  Hold RASSI, HCTZ   BID BMP   Monitor Urine out put   Strict Ins and outs   Switch Xeralto to Eliquis   If kidney function continues to worsen, pt may require dialysis in the next few days. This is communicated to the patient and family present at bedside and they showed understanding      #Severe Hypercalcemia   Likely due to Primary hyperparathyroidism  Ca 12.8 now    Plan:  s/p Pamidronate 60mg over 4 hours on 10/11 (Denosumab not available per pharmacy). Effect is usually seen after 2-4 days. Will not repeat a dose  Continue with IVF as per endo recs  Maintain net even to slightly positive fluid balance as Hypovolemia exacerbates hypercalcemia  BID BMP   Can also give lasix 40mg x1 if patient shows signs of vol overload   Endocrinology on board; pt will need parathyroidectomy    Watson Fletcher M.D  PGY-4 Nephrology  [phone]

## 2022-10-14 NOTE — DIETITIAN INITIAL EVALUATION ADULT - OTHER CALCULATIONS
Based on Standards of Care pt >% IBW thus ideal body weight used for all calculations. Needs adjusted for current clinical course. Meet lower end of protein needs pending renal function improvement.

## 2022-10-14 NOTE — PROGRESS NOTE ADULT - PROBLEM SELECTOR PLAN 5
CT Chest w/o Contrast 10/11: Thick-walled cystic and/or necrotic mediastinal lesion measuring 3.5 x 2.4 x 3.2 described above, surrounding regional lymphadenopathy or significant fat planes stranding. DDx: bronchopulmonary malformation such as a bronchogenic and esophageal duplication cyst. Cystic degeneration of a malignant mediastinal lesion including lymphoma and necrotic lymphadenopathy.    - endocrine, ENT, and renal following.   - planned for OR tomorrow for mediastinal mass resection with ENT & CT surgery, will f/u CT Chest w/o Contrast 10/11: Thick-walled cystic and/or necrotic mediastinal lesion measuring 3.5 x 2.4 x 3.2 described above, surrounding regional lymphadenopathy or significant fat planes stranding. DDx: bronchopulmonary malformation such as a bronchogenic and esophageal duplication cyst. Cystic degeneration of a malignant mediastinal lesion including lymphoma and necrotic lymphadenopathy.    - endocrine, ENT, and renal following.   - planned for OR next week for mediastinal mass resection with ENT & CT surgery, will f/u

## 2022-10-14 NOTE — PROGRESS NOTE ADULT - TIME BILLING
Patient seen and examined with house-staff during bedside rounds.  Resident note read, including vitals, physical findings, laboratory data, and radiological reports.   Revisions included below.  Direct personal management at bed side and extensive interpretation of the data.  Plan was outlined and discussed in details with the housestaff.  Decision making of high complexity  Action taken for acute disease activity to reflect the level of care provided:  - medication reconciliation  - review laboratory data      I discussed the case in details with thoracic surgery ENT and endocrine and the family.  The patient is stable.  Anticoagulation is on hold.  Calcium decreased creatinine is about the same.  There is no evidence of fluid overload.  Sodium started to come down.  The family requested transfer the patient to [facility] for surgery.  I discussed with the thoracic surgeon at [facility].  I called the transfer center and await insurance authorization for transfer

## 2022-10-14 NOTE — PROGRESS NOTE ADULT - SUBJECTIVE AND OBJECTIVE BOX
OTOLARYNGOLOGY (ENT) PROGRESS NOTE    PATIENT: REKAH PALACIO  MRN: [id number]  : 74  [id number]  DATE OF SERVICE:  10-14-22  			         ID:REKHA PALACIO is a 52y Male PMH HTN, T2DM, HLD, Hypothyroidism, CVA 4 wks ago w/ residual R weakness and slurred speech admitted 10/9 for 3-4d of multiple NBNB nausea and vomiting found to have elevated Ca to 17, , vit d 40.8. Also with ARPITA . uptake corresponding to a 3.2 cm nodule at the aortopulmonary region suggestive of an ectopic parathyroid adenoma within the mediastinum. Diffuse uptake in the thyroid gland.    Subjective/ Interval:   10/13: Plan for parathyroidectomy with exploration of mediastinum with Dr. Medina and CTS 10/14.   10/14:  OR pushed to next week to accommodate CTS schedule. Continue to minor and treat as per primary team     ALLERGIES:  No Known Allergies      MEDICATIONS:  Antiinfectives:   piperacillin/tazobactam IVPB.. 4.5 Gram(s) IV Intermittent every 12 hours    IV fluids:  sodium chloride 0.9%. 1000 milliLiter(s) IV Continuous <Continuous>    Hematologic/Anticoagulation:  heparin   Injectable 5000 Unit(s) SubCutaneous every 8 hours    Pain medications/Neuro:  acetaminophen     Tablet .. 975 milliGRAM(s) Oral every 6 hours PRN  melatonin 3 milliGRAM(s) Oral at bedtime PRN  ondansetron Injectable 4 milliGRAM(s) IV Push every 6 hours PRN    Endocrine Medications:   atorvastatin 80 milliGRAM(s) Oral at bedtime  levothyroxine Injectable 20 MICROGram(s) IV Push <User Schedule>    All other standing medications:   amLODIPine   Tablet 5 milliGRAM(s) Oral daily  polyethylene glycol 3350 17 Gram(s) Oral daily  senna 2 Tablet(s) Oral at bedtime    All other PRN medications:  aluminum hydroxide/magnesium hydroxide/simethicone Suspension 30 milliLiter(s) Oral every 6 hours PRN    Vital Signs Last 24 Hrs  T(C): 36.6 (14 Oct 2022 05:09), Max: 37.3 (13 Oct 2022 09:44)  T(F): 97.9 (14 Oct 2022 05:09), Max: 99.2 (13 Oct 2022 09:44)  HR: 78 (14 Oct 2022 05:02) (76 - 87)  BP: 169/96 (14 Oct 2022 05:02) (162/94 - 180/103)  BP(mean): 124 (14 Oct 2022 05:02) (120 - 134)  RR: 46 (14 Oct 2022 05:02) (25 - 49)  SpO2: 95% (14 Oct 2022 05:02) (93% - 96%)    Parameters below as of 14 Oct 2022 05:02  Patient On (Oxygen Delivery Method): room air          10-13 @ 07:01  -  10-14 @ 07:00  --------------------------------------------------------  IN:    IV PiggyBack: 600 mL    Oral Fluid: 500 mL    sodium chloride 0.9%: 1600 mL  Total IN: 2700 mL    OUT:    Indwelling Catheter - Urethral (mL): 2050 mL  Total OUT: 2050 mL    Total NET: 650 mL      PHYSICAL EXAM:  GEN: appears stated age, NAD, confused   Neck: soft/flat, no LAD  Respiratory: no respiratory distress, stridor, or stertor  Extremities: WWP; no edema  Neurological: AAOx3; No focal deficits noted         LABS                       13.4   8.68  )-----------( 202      ( 14 Oct 2022 05:30 )             39.1    10-14    138  |  111<H>  |  28<H>  ----------------------------<  95  3.7   |  18<L>  |  4.43<H>    Ca    12.8<H>      14 Oct 2022 05:30  Phos  2.7     10-14  Mg     1.8     10-14    TPro  5.4<L>  /  Alb  2.6<L>  /  TBili  0.4  /  DBili  x   /  AST  19  /  ALT  13  /  AlkPhos  68  10-14         Coagulation Studies-   PT/INR - ( 14 Oct 2022 05:30 )   PT: 18.1 sec;   INR: 1.51          PTT - ( 14 Oct 2022 05:30 )  PTT:32.1 sec    Endocrine Panel-  Calcium, Total Serum: 12.8 mg/dL (10-14 @ 05:30)  Calcium, Total Serum: 12.9 mg/dL (10-13 @ 18:40)        COVID-19 PCR: NotDetec (10-13-22 @ 15:11)    MICROBIOLOGY:  Culture Results:   No growth at 2 days. (10-12-22 @ 05:52)  Culture Results:   No growth at 2 days. (10-12-22 @ 05:52)      Culture - Blood (collected 10-12-22 @ 05:52)  Source: .Blood Blood  Preliminary Report (10-14-22 @ 06:00):    No growth at 2 days.    Culture - Blood (collected 10-12-22 @ 05:52)  Source: .Blood Blood  Preliminary Report (10-14-22 @ 06:00):    No growth at 2 days.

## 2022-10-14 NOTE — DIETITIAN INITIAL EVALUATION ADULT - ADD RECOMMEND
1. Continue with current diet order; if poor PO intake persists, consider Ensure Enlive 2x/day (350 kcal, 20 g protein per serving) 2. Encourage pt to meet nutritional needs as able 3. RD to obtain additional subjective information and provide diet ed as able 4. Pain and bowel regimen per team 5. Will assess/honor preferences as able 6. Monitor risk for malnutrition

## 2022-10-14 NOTE — CONSULT NOTE ADULT - CONSULT REASON
AMS
ARPITA
concerns for need anticoagulation reversal for upcoming surgical procedure
Hypercalcemia
hypercalcemia
Pre op
Mediastinal Mass

## 2022-10-14 NOTE — PROGRESS NOTE ADULT - SUBJECTIVE AND OBJECTIVE BOX
INTERVAL HPI/OVERNIGHT EVENTS:    Continues to be febrile. Cr 4.4 with BUN 28. Corrected Calcium 13.9 today. Per family, he is not eating and only taking sips of fluids, but denies nausea. Reports gas pain.    Pt reports the following symptoms:    Constitutional:  (+) Fever, (+) loss of appetite.   Eyes:  Negative blurry vision or double vision.  Cardiovascular:  Negative for chest pain or palpitations.  Respiratory:  Negative for cough, wheezing, or shortness of breath.   Gastrointestinal:  Negative for nausea, vomiting, diarrhea, constipation, or abdominal pain.  Neurologic:  No headache, confusion, dizziness, lightheadedness.    MEDICATIONS  (STANDING):  amLODIPine   Tablet 5 milliGRAM(s) Oral daily  atorvastatin 80 milliGRAM(s) Oral at bedtime  heparin   Injectable 5000 Unit(s) SubCutaneous every 8 hours  levothyroxine Injectable 20 MICROGram(s) IV Push <User Schedule>  piperacillin/tazobactam IVPB.. 4.5 Gram(s) IV Intermittent every 12 hours  polyethylene glycol 3350 17 Gram(s) Oral daily  senna 2 Tablet(s) Oral at bedtime  sodium chloride 0.9%. 1000 milliLiter(s) (200 mL/Hr) IV Continuous <Continuous>    MEDICATIONS  (PRN):  acetaminophen     Tablet .. 975 milliGRAM(s) Oral every 6 hours PRN Temp greater or equal to 38C (100.4F), Mild Pain (1 - 3), Moderate Pain (4 - 6)  aluminum hydroxide/magnesium hydroxide/simethicone Suspension 30 milliLiter(s) Oral every 6 hours PRN Dyspepsia  melatonin 3 milliGRAM(s) Oral at bedtime PRN Insomnia  ondansetron Injectable 4 milliGRAM(s) IV Push every 6 hours PRN Nausea and/or Vomiting      PHYSICAL EXAM  Vital Signs Last 24 Hrs  T(C): 36.6 (14 Oct 2022 14:52), Max: 37.3 (13 Oct 2022 18:10)  T(F): 97.9 (14 Oct 2022 14:52), Max: 99.1 (13 Oct 2022 18:10)  HR: 87 (14 Oct 2022 16:06) (78 - 88)  BP: 165/96 (14 Oct 2022 16:06) (165/96 - 180/103)  BP(mean): 125 (14 Oct 2022 16:06) (124 - 134)  RR: 27 (14 Oct 2022 16:06) (25 - 49)  SpO2: 95% (14 Oct 2022 16:06) (92% - 95%)    Parameters below as of 14 Oct 2022 16:06  Patient On (Oxygen Delivery Method): room air    Constitutional: Awake, alert, male, in no acute distress.   HEENT: Normocephalic, atraumatic, MADY.  Respiratory: (+) Decreased breath sounds over bases, coarse breath sounds. Patient is visibly tachypneic; however, doesn't report feeling short of breath.   Cardiovascular: regular rhythm, normal S1 and S2, no audible murmurs.   GI: soft, non-tender, non-distended, bowel sounds present.  Extremities: No lower extremity edema.  Psychiatric: AAO x 3. Normal affect/mood.     LABS:                        13.4   8.68  )-----------( 202      ( 14 Oct 2022 05:30 )             39.1     10-14    138  |  111<H>  |  28<H>  ----------------------------<  95  3.7   |  18<L>  |  4.43<H>    Ca    12.8<H>      14 Oct 2022 05:30  Phos  2.7     10-14  Mg     1.8     10-14    TPro  5.4<L>  /  Alb  2.6<L>  /  TBili  0.4  /  DBili  x   /  AST  19  /  ALT  13  /  AlkPhos  68  10-14    PT/INR - ( 14 Oct 2022 05:30 )   PT: 18.1 sec;   INR: 1.51          PTT - ( 14 Oct 2022 05:30 )  PTT:32.1 sec

## 2022-10-14 NOTE — PROGRESS NOTE ADULT - PROBLEM SELECTOR PLAN 2
- pt acutely febrile to 101F  (10/11 overnight), given tylenol, blood cultures drawn  - started on vanc and zosyn for HAP coverage, given hospitalizations    Plan:   - f/u BCx, monitor leukocytosis (downtrending)  - f/u Vanc trough in AM, re-dose as needed  - continue zosyn 4.5g q12h  - f/u MRSA - pt acutely febrile to 101F on 10/11 overnight, given tylenol, blood cultures drawn  - started on vanc and zosyn for HAP coverage, given hospitalizations    Plan:   - f/u BCx (NGTD)  - f/u Vanc trough in AM  - continue zosyn 4.5g q12h  - f/u MRSA

## 2022-10-14 NOTE — PROGRESS NOTE ADULT - ASSESSMENT
Mr. Edmond is a 52-years-old male with a past medical history of type 2 diabetes mellitus, hypertension, hyperlipidemia and  cerebrovascular accidents who presented to the emergency department complaining of nausea and multiple episodes of non-bloody non-bilious vomiting for the past ~4 days found to have hypercalcemia and acute kidney injury.     Upon review of outpatient labs, patient has had hypercalcemia for more than 2 years, last level was 11 mg/dL (9/3/22 at [facility]). In addition, his creatinine was 1.4 as of 8/2022. Per patient, he was told that his calcium was high in the past, and that he was going to need surgery; however, further management of hypercalcemia was postponed due to his stroke. PTH was 659 and Vitamin D 25-OH was 40.8. Hypercalcemia was thought to be secondary to primary hyperparathyroidism. Imaging now remarkable for a 3.2 cm nodule at the aortopulmonary region suggestive of an ectopic parathyroid adenoma within the mediastinum, differential diagnosis included a neoplastic process. He's planned for mediastinal mass removal next week.    13.9 mg/dl (10/14/2022)  15.0 mg/dL (10/13/22 - 06:19)   17.1 mg/dL (10/12/22 - 06:18)   17.5 mg/dL (10/11/22 - 06:30)   15.2 mg/dL (10/10/22 - 05:30)    16.9 mg/dL (10/9/22 - 05:30)   17.2 mg/dL (10/9/22 - 00:07)     A1C: 6.2%  Weight: 91 kg  BMI: 32.5  Creatinine: 4.35  GFR: 16  Ejection Fraction: >75%

## 2022-10-14 NOTE — PROGRESS NOTE ADULT - SUBJECTIVE AND OBJECTIVE BOX
SUBJECTIVE / INTERVAL HPI: Patient seen and examined at bedside.     VITAL SIGNS:  Vital Signs Last 24 Hrs  T(C): 36.6 (14 Oct 2022 14:52), Max: 37.3 (13 Oct 2022 18:10)  T(F): 97.9 (14 Oct 2022 14:52), Max: 99.1 (13 Oct 2022 18:10)  HR: 88 (14 Oct 2022 12:50) (76 - 88)  BP: 169/101 (14 Oct 2022 12:50) (167/88 - 180/103)  BP(mean): 128 (14 Oct 2022 12:50) (120 - 134)  RR: 25 (14 Oct 2022 12:50) (25 - 49)  SpO2: 92% (14 Oct 2022 12:50) (92% - 96%)    Parameters below as of 14 Oct 2022 12:50  Patient On (Oxygen Delivery Method): room air        PHYSICAL EXAM:    General: NAD, fatigued  HEENT: NCAT  Neck: supple, trachea midline  Cardiovascular: S1, S2 normal; RRR, no M/G/R  Respiratory: CTABL; no W/R/R  Gastrointestinal: soft. no longer tender, ND. no rebound or guarding.   Skin: no ulcerations or visible rashes appreciated  Extremities: WWP; mild 1+ edema UE & LEs b/l. No clubbing or cyanosis  Vascular: 2+ DP pulses.   Neurological: AAOx1-2; lethargic, CN II-XII grossly intact; no focal     MEDICATIONS:  MEDICATIONS  (STANDING):  amLODIPine   Tablet 5 milliGRAM(s) Oral daily  atorvastatin 80 milliGRAM(s) Oral at bedtime  heparin   Injectable 5000 Unit(s) SubCutaneous every 8 hours  levothyroxine Injectable 20 MICROGram(s) IV Push <User Schedule>  piperacillin/tazobactam IVPB.. 4.5 Gram(s) IV Intermittent every 12 hours  polyethylene glycol 3350 17 Gram(s) Oral daily  senna 2 Tablet(s) Oral at bedtime  sodium chloride 0.9%. 1000 milliLiter(s) (200 mL/Hr) IV Continuous <Continuous>    MEDICATIONS  (PRN):  acetaminophen     Tablet .. 975 milliGRAM(s) Oral every 6 hours PRN Temp greater or equal to 38C (100.4F), Mild Pain (1 - 3), Moderate Pain (4 - 6)  aluminum hydroxide/magnesium hydroxide/simethicone Suspension 30 milliLiter(s) Oral every 6 hours PRN Dyspepsia  melatonin 3 milliGRAM(s) Oral at bedtime PRN Insomnia  ondansetron Injectable 4 milliGRAM(s) IV Push every 6 hours PRN Nausea and/or Vomiting      ALLERGIES:  Allergies    No Known Allergies    Intolerances        LABS:                        13.4   8.68  )-----------( 202      ( 14 Oct 2022 05:30 )             39.1     10-14    138  |  111<H>  |  28<H>  ----------------------------<  95  3.7   |  18<L>  |  4.43<H>    Ca    12.8<H>      14 Oct 2022 05:30  Phos  2.7     10-14  Mg     1.8     10-14    TPro  5.4<L>  /  Alb  2.6<L>  /  TBili  0.4  /  DBili  x   /  AST  19  /  ALT  13  /  AlkPhos  68  10-14    PT/INR - ( 14 Oct 2022 05:30 )   PT: 18.1 sec;   INR: 1.51          PTT - ( 14 Oct 2022 05:30 )  PTT:32.1 sec    CAPILLARY BLOOD GLUCOSE          RADIOLOGY & ADDITIONAL TESTS: Reviewed.

## 2022-10-14 NOTE — PROGRESS NOTE ADULT - NS ATTEND AMEND GEN_ALL_CORE FT
THe patient' THe patient's adjusted serum calcium has decreased to 13.9 mg%. Surgery was planned for today ubt Hematology refused to reverse his anticoagulation and neurology was called re past CVC evaluation. The patient's family want him transferred to [place].

## 2022-10-14 NOTE — CONSULT NOTE ADULT - SUBJECTIVE AND OBJECTIVE BOX
Neurology Consult    Patient is a 52y old  Male who presents with a chief complaint of HYPERCALCEMIA. Patient was referred to neurology for AMS. The patient was seen and examined at the bedside. The son in law was at the bedside and helped in history taking. The patient had a recent ischemic stroke that caused him Rt side weakness and slurred speech, that improved. He presented this time to the hospital for nausea and vomiting and AMS. On presentation, he was found to have elevated Ca, and CKD. The patient currently was able to contribute to the conversation and mentioned he had a recent stroke. He was not able to give a full history. As per the son in law, the patient is currently better.      (14 Oct 2022 12:01)      HPI:  51 yo M w/ PMHx HTN, T2DM, HLD, Hypothyroidism, CVA (most recently admitted ~4w ago, admitted at [facility], R weakness, slurred speech) presents with 3-4d of multiple NBNB nausea and vomiting. In the ED, he was found to have a calcium of 17.1. He says he hasn't been able to keep down any liquids/food. Also, per the son over the past few weeks he has seemed more lethargic and not himself. 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 He takes Lisinopril-HCTZ daily for HTN. He denies recent tums or calcium supplementation. Most recent Calcium /3 at [facility] was 11. No other systemic symptoms. Denies HA, confusion, f/c, cough, sore throat, SOB, CP, abd pain, urinary symptoms, new weakness/numbness, black/bloody stool, rashes.    In the ED:  Initial vital signs: T: 98.9 degrees F, HR: 59, BP: 175/111, R: 18, SpO2: 96% on RA  Labs: significant for WBC 12.27, Hgb 17.3, hypercalcemia 17.1, BUN 25, Cr 3.34,   Imaging: US Abd :No cholelithiasis or cholecystitis.  EKG: Sinus rhythm w/ PVCs, normal QTc  Medications: 2L NS Bolus, Zofran 4mg   Consults: none  (09 Oct 2022 01:41)      PAST MEDICAL & SURGICAL HISTORY:  Stroke      HTN (hypertension)      Diabetes      Hypothyroidism      Hyperlipidemia          FAMILY HISTORY:      Social History: (-) x 3    Allergies    No Known Allergies    Intolerances        MEDICATIONS  (STANDING):  amLODIPine   Tablet 5 milliGRAM(s) Oral daily  atorvastatin 80 milliGRAM(s) Oral at bedtime  heparin   Injectable 5000 Unit(s) SubCutaneous every 8 hours  levothyroxine Injectable 20 MICROGram(s) IV Push <User Schedule>  piperacillin/tazobactam IVPB.. 4.5 Gram(s) IV Intermittent every 12 hours  polyethylene glycol 3350 17 Gram(s) Oral daily  senna 2 Tablet(s) Oral at bedtime  sodium chloride 0.9%. 1000 milliLiter(s) (200 mL/Hr) IV Continuous <Continuous>    MEDICATIONS  (PRN):  acetaminophen     Tablet .. 975 milliGRAM(s) Oral every 6 hours PRN Temp greater or equal to 38C (100.4F), Mild Pain (1 - 3), Moderate Pain (4 - 6)  aluminum hydroxide/magnesium hydroxide/simethicone Suspension 30 milliLiter(s) Oral every 6 hours PRN Dyspepsia  melatonin 3 milliGRAM(s) Oral at bedtime PRN Insomnia  ondansetron Injectable 4 milliGRAM(s) IV Push every 6 hours PRN Nausea and/or Vomiting      Review of systems:    Constitutional: as per HPI  Eyes: No eye pain or discharge  ENMT:  No difficulty hearing; No sinus or throat pain  Neck: No pain or stiffness  Respiratory: No cough, wheezing, chills or hemoptysis  Cardiovascular: No chest pain, palpitations, shortness of breath, dyspnea on exertion  Gastrointestinal: No abdominal pain, nausea, vomiting or hematemesis; No diarrhea or constipation.   Genitourinary: No dysuria, frequency, hematuria or incontinence  Neurological: As per HPI  Skin: No rashes or lesions   Endocrine: No heat or cold intolerance; No hair loss  Musculoskeletal: No joint pain or swelling  Psychiatric: No depression, anxiety, mood swings  Heme/Lymph: No easy bruising or bleeding gums    Vital Signs Last 24 Hrs  T(C): 36.4 (14 Oct 2022 17:55), Max: 37.3 (13 Oct 2022 21:49)  T(F): 97.5 (14 Oct 2022 17:55), Max: 99.1 (13 Oct 2022 21:49)  HR: 87 (14 Oct 2022 16:06) (78 - 88)  BP: 165/96 (14 Oct 2022 16:06) (165/96 - 180/103)  BP(mean): 125 (14 Oct 2022 16:06) (124 - 134)  RR: 27 (14 Oct 2022 16:06) (25 - 49)  SpO2: 95% (14 Oct 2022 16:06) (92% - 95%)    Parameters below as of 14 Oct 2022 16:06  Patient On (Oxygen Delivery Method): room air        Examination:  General:  Appearance is consistent with chronologic age.  No abnormal facies.  Gross skin survey within normal limits.    Cognitive/Language:  The patient is oriented to person, place, he was able to mention the year but not the month, day, or date.  Language with normal repetition, comprehension and naming.  Mild dysarthric.    Eyes: intact VFF.  EOMI w/o nystagmus, skew or reported double vision.  PERRL.  No ptosis/weakness of eyelid closure.    Face:  Facial sensation normal V1 - 3, no facial asymmetry.    Ears/Nose/Throat:  Hearing grossly intact b/l.  Palate elevates midline.  Tongue and uvula midline.   Motor examination:   Normal tone, bulk and range of motion.  No tenderness, twitching, tremors or involuntary movements.  Formal Muscle Strength Testin+/5 all over.  No observable drift.  Reflexes: 2+ b/l biceps, triceps, brachioradialis, patella and +1 Achilles.  Plantar response up going b/l.   Sensory examination: Intact to light touch and pinprick in all extremities.  Cerebellum: FTN/HKS intact with normal ANGE in all limbs.  No dysmetria or dysdiadokinesia.            Labs:   CBC Full  -  ( 14 Oct 2022 05:30 )  WBC Count : 8.68 K/uL  RBC Count : 4.47 M/uL  Hemoglobin : 13.4 g/dL  Hematocrit : 39.1 %  Platelet Count - Automated : 202 K/uL  Mean Cell Volume : 87.5 fl  Mean Cell Hemoglobin : 30.0 pg  Mean Cell Hemoglobin Concentration : 34.3 gm/dL  Auto Neutrophil # : 6.29 K/uL  Auto Lymphocyte # : 1.04 K/uL  Auto Monocyte # : 1.01 K/uL  Auto Eosinophil # : 0.28 K/uL  Auto Basophil # : 0.03 K/uL  Auto Neutrophil % : 72.6 %  Auto Lymphocyte % : 12.0 %  Auto Monocyte % : 11.6 %  Auto Eosinophil % : 3.2 %  Auto Basophil % : 0.3 %    10-14    138  |  111<H>  |  28<H>  ----------------------------<  95  3.7   |  18<L>  |  4.43<H>    Ca    12.8<H>      14 Oct 2022 05:30  Phos  2.7     10-  Mg     1.8     10-14    TPro  5.4<L>  /  Alb  2.6<L>  /  TBili  0.4  /  DBili  x   /  AST  19  /  ALT  13  /  AlkPhos  68  10-14    LIVER FUNCTIONS - ( 14 Oct 2022 05:30 )  Alb: 2.6 g/dL / Pro: 5.4 g/dL / ALK PHOS: 68 U/L / ALT: 13 U/L / AST: 19 U/L / GGT: x           PT/INR - ( 14 Oct 2022 05:30 )   PT: 18.1 sec;   INR: 1.51          PTT - ( 14 Oct 2022 05:30 )  PTT:32.1 sec        Neuroimaging:  NCHCT: CT Head No Cont:   ACC: [id number] EXAM:  CT BRAIN                          PROCEDURE DATE:  10/14/2022          INTERPRETATION:  stroke    Technique: CT head was performed utilizing axial images from the base of   the skull through the vertex without the administration of intravenous   contrast. Images were reviewed in the bone, brain and subdural windows.    Findings: Comparison is made to prior CT head 12.    There is no evidence of acute intracranial hemorrhage or acute   transcortical infarct.  There is aleft paracentral midbrain subacute to   chronic infarct. There is a right parietal temporal chronic infarct.   There are tiny bilateral basal ganglia, external capsules and thalamic   lacunar infarcts. There are patchy and punctate foci of low densities   within the white matter that consistent with microvascular disease. . The   ventricles are normal in size and caliber.  There is no evidence of mass-effect or midline shift. There is no   evidence of an intra or extra-axial fluid collection.    Visualized paranasal sinuses and bilateral mastoid air cells are clear.    Impression:  .    Left paracentral midbrain subacute to chronic infarct. Right parietal   temporal chronic infarct.  Tiny bilateral basal ganglia, external   capsules and thalamic lacunar infarcts. Mild to moderate microvascular   disease.    --- End of Report ---            LUDWIN TRENT MD; Attending Radiologist  This document has been electronically signed. Oct 14 2022 10:51AM (10-14-22 @ 09:34)      10-14-22 @ 19:32

## 2022-10-14 NOTE — DIETITIAN INITIAL EVALUATION ADULT - OTHER INFO
51 yo M w/ PMHx HTN, T2DM, HLD, Hypothyroidism, CVA presented with 3-4d of multiple NBNB nausea and vomiting, found to have a calcium of 17.1, likely 2/2 to primary hyperparathyroidism, & found to have mediastinal mass, admitted to telemetry for further management of hypercalcemia & ARPITA.     Attempted to visit pt at bedside, however, pt out of room for testing thus interview deferred to visitor at bedside (limited information obtained). No n/v/d/c documented at this time. Last documented bowel movement 10/10. Confirms NKFA. Reports pt w/ poor PO intake x 2 weeks PTA. Dosing weight 201 pounds. No weight history in EMR. Generalized edema 1+. No pressure ulcers documented at this time. Good score=15. Labs reviewed 10/14; noted w/ elevated BUN/Cr. Unable to perform nutrition focused physical exam at this time. Based on ASPEN guidelines, pt does not meet criteria for malnutrition at this time, will monitor risk. Discussed current diet order DASH/TLC Diet; provided visitor w/ brief ed on diet (would benefit from reinforcement as able). Reports pt w/ poor PO intake during hospital stay, "not touching meals". Notes pt follows Kosher diet at home. Made aware RD remains available. RD to follow up per protocol. See nutrition recommendations below.

## 2022-10-14 NOTE — PROGRESS NOTE ADULT - PROBLEM SELECTOR PLAN 1
- Status post 4 doses of calcitonin on admission. Last dose on 10/10.  - Received pamidronate 60 mg IVP on 10/11.  - Calcium improved to 13.9 mg/dL (corrected) today. Continue with NS at 200 mL/hr.   - He's planned for removal of mediastinal mass next week.    Thank you for allowing us to participate in the care of Mr. Edmond.   Will continue to monitor.       Case discussed with Dr. Fox. Primary team updated.

## 2022-10-14 NOTE — PROGRESS NOTE ADULT - NS ATTEST RISK PROBLEM GEN_ALL_CORE FT
Severe hypercalcemia  Renal dysfunction
hypercalcemia and possibility of parathyroid carcinoma
If mediastinal finding is a parathyroid carcinoma.
unresolved hypercalcemia and ? renal dysfunction
likely parathyroid carcinoma and surgical delay

## 2022-10-14 NOTE — CONSULT NOTE ADULT - ATTENDING COMMENTS
AMS improved but he does have significant paraparesis with upper motor neuron signs including crossed adductors and upgoing does    would check MRI brain, C and T spine

## 2022-10-14 NOTE — CHART NOTE - NSCHARTNOTEFT_GEN_A_CORE
Patient seen and examined today. Responding appropriately to questions. Underwent head CT. Still not ambulating.    Please obtain Neuro Stroke Service evaluation  Please obtain physical therapy evaluation  Continue other supportive care, will plan for surgery on Tuesday in the cardiac surgery operating rooms with cardiopulmonary bypass on standby.     We will continue to follow.     Chris Ugalde MD

## 2022-10-14 NOTE — PROGRESS NOTE ADULT - ATTENDING COMMENTS
I:   Calcium 12. Cretainine up to 4.43.   A: ARPITA and hypercalcemia due to hyperpara.   P: No indications for dialysis. Continue IVf. Follow calcium, SCR.
The patient ihas been known to have hypercalcemia in the past. His wife denies history of renal disease. He presents now with serum calcium of 17,1 with Intact PTH in the 600's. His  Creatinine is 3.2 but with a BUN surya 22. On Fluid therapy the calcium has decreased to 14.8 mg%. He is also receiving Calcitonin. Depending on his renal function and serum calcium tomorrow, Pamidronate may be considered,
I:   Calcium down to 13. Creatinine up to 4s.   A: Hypercalcemia due to hyperpara.   P: No indications for dialysis. Follow SCr. Follow calcium. Calcium management post parathyroidectomy per endo.
I:   Creatinine 3.47. Calcium up to 17.   A: Worsened ARPITA. Hypercalcemia.   P: IVF, calcitonin. Prolia not avaialble. Pamidrone 60 mg given as alternative as slow infusion. (Discussed with pharmacy.)
The patient had a fever of 101.5 overnight. His adjusted serum calcium has decreased to 15.4 from 17.1. Serum creatinine is elevated to 4.35. Mediastinal surgical resection of his lesion will be done tomorrow with Jomar Francisco and the CT surgery team.
The serum calcium increased from 16.5 to 17.1. Serum creatinine conchita from 3.2 to 3.89. I agree to give the patient 1 Liter of normal saline hourly then 200 cc every hour. Pamidronate 60 mg started. CT Scan reveals a mediastinal mass ?  necrotic parathyroid adenoma/parathyroid carcinoma. He needs to be made less hypercalcemic prior to CT surgery.
Acute severe hypercalcemia, etiology not clear with ARPITA with hypothyroidism. Increase hydration to 250 cc/hr. Add calcitonin one dose. Repeat BMP after 4 hours. Endocrinology consult.
Patient seen and examined with house-staff during bedside rounds.  Resident note read, including vitals, physical findings, laboratory data, and radiological reports.   Revisions included below.  Direct personal management at bed side and extensive interpretation of the data.  Plan was outlined and discussed in details with the housestaff.  Decision making of high complexity  Action taken for acute disease activity to reflect the level of care provided:  - medication reconciliation  - review laboratory data    Discussed the case with ENT and thoracic surgery    Patient was cleared by cardiology for the procedure    Hematology did not clear the patient I will discussed with the hematology attending.  The calcium decreased with hydration but the concern of fluid overload.  The renal function is not improving follow-up with nephrology.  Discussed the case in details with the family.

## 2022-10-14 NOTE — PROGRESS NOTE ADULT - PROBLEM SELECTOR PLAN 1
Likely 2/2 hyperparathyroidism   Patient presenting w/ calcium of 17.1. Last Ca 9/3 11 at [facility].   - s/p 1826u calcitonin total, lasix 40mg IVP x1, pamidronate 60mg IVP x1, & IVF  - , Vit D, 25 40.8, Vit D125: 27.5. Ionized Calcium 8.0  - NM PET CT 10/12: 3.2 cm nodule suggestive of an ectopic parathyroid adenoma within mediastinum. Subtle parathyroid hyperplasia or adenomas at the thyroid cannot be excluded.  - US Thyroid 10/13: No parathyroid adenoma is seen.    - Continue q12h BMP, on NS 200cc/hr, strict I/Os  - Renal, Endo, ENT following. Per renal, ok to give lasix 40mg x1 once patient volume repleted  - f/u serum PTH-related peptide & paraneoplastic autoantibody evaluation

## 2022-10-14 NOTE — PROGRESS NOTE ADULT - SUBJECTIVE AND OBJECTIVE BOX
Patient is a 52y Male seen and evaluated at bedside. Overnight and this AM events noted. Ca improving. Pt at his baseline mentation. Cr seems to be plateaued       Meds:    acetaminophen     Tablet .. 975 every 6 hours PRN  aluminum hydroxide/magnesium hydroxide/simethicone Suspension 30 every 6 hours PRN  amLODIPine   Tablet 5 daily  atorvastatin 80 at bedtime  heparin   Injectable 5000 every 8 hours  levothyroxine Injectable 20 <User Schedule>  melatonin 3 at bedtime PRN  ondansetron Injectable 4 every 6 hours PRN  piperacillin/tazobactam IVPB.. 4.5 every 12 hours  polyethylene glycol 3350 17 daily  senna 2 at bedtime  sodium chloride 0.9%. 1000 <Continuous>      T(C): , Max: 37.3 (10-13-22 @ 21:49)  T(F): , Max: 99.1 (10-13-22 @ 21:49)  HR: 87 (10-14-22 @ 16:06)  BP: 165/96 (10-14-22 @ 16:06)  BP(mean): 125 (10-14-22 @ 16:06)  RR: 27 (10-14-22 @ 16:06)  SpO2: 95% (10-14-22 @ 16:06)  Wt(kg): --    10-13 @ 07:01  -  10-14 @ 07:00  --------------------------------------------------------  IN: 2700 mL / OUT: 2050 mL / NET: 650 mL    10-14 @ 07:01  -  10-14 @ 19:04  --------------------------------------------------------  IN: 1100 mL / OUT: 1000 mL / NET: 100 mL          Review of Systems:  ROS negative except as per HPI      PHYSICAL EXAM:  General: NAD, AAOX3  Cardiovascular: S1, S2 normal; RRR, no M/G/R  Respiratory: CTABL; no W/R/R  Gastrointestinal: soft. +mild, diffuse tenderness. ND. no rebound or guarding.   Extremities: WWP; no edema, clubbing or cyanosis  Neurological: AAOx3; No focal deficits noted    LABS:                        13.4   8.68  )-----------( 202      ( 14 Oct 2022 05:30 )             39.1     10-14    138  |  111<H>  |  28<H>  ----------------------------<  95  3.7   |  18<L>  |  4.43<H>    Ca    12.8<H>      14 Oct 2022 05:30  Phos  2.7     10-14  Mg     1.8     10-14    TPro  5.4<L>  /  Alb  2.6<L>  /  TBili  0.4  /  DBili  x   /  AST  19  /  ALT  13  /  AlkPhos  68  10-14      PT/INR - ( 14 Oct 2022 05:30 )   PT: 18.1 sec;   INR: 1.51          PTT - ( 14 Oct 2022 05:30 )  PTT:32.1 sec          RADIOLOGY & ADDITIONAL STUDIES:

## 2022-10-14 NOTE — PROGRESS NOTE ADULT - ASSESSMENT
53 yo M w/ PMHx HTN, T2DM, HLD, Hypothyroidism, CVA presented with 3-4d of multiple NBNB nausea and vomiting, found to have a calcium of 17.1, likely 2/2 to primary hyperparathyroidism, & found to have mediastinal mass, admitted to telemetry for further management of hypercalcemia & ARPITA.  53 yo M w/ PMHx HTN, T2DM, HLD, Hypothyroidism, CVA presented with 3-4d of multiple NBNB nausea and vomiting, found to have a calcium of 17.1, likely 2/2 to primary hyperparathyroidism, & found to have mediastinal mass, admitted to telemetry for further management of hypercalcemia & ARPITA, and planned for mediastinal mass resection next week.

## 2022-10-15 ENCOUNTER — TRANSCRIPTION ENCOUNTER (OUTPATIENT)
Age: 52
End: 2022-10-15

## 2022-10-15 VITALS
RESPIRATION RATE: 22 BRPM | HEART RATE: 92 BPM | DIASTOLIC BLOOD PRESSURE: 91 MMHG | OXYGEN SATURATION: 95 % | SYSTOLIC BLOOD PRESSURE: 170 MMHG

## 2022-10-15 LAB
ALBUMIN SERPL ELPH-MCNC: 2.8 G/DL — LOW (ref 3.3–5)
ALP SERPL-CCNC: 72 U/L — SIGNIFICANT CHANGE UP (ref 40–120)
ALT FLD-CCNC: 11 U/L — SIGNIFICANT CHANGE UP (ref 10–45)
ANION GAP SERPL CALC-SCNC: 9 MMOL/L — SIGNIFICANT CHANGE UP (ref 5–17)
AST SERPL-CCNC: 17 U/L — SIGNIFICANT CHANGE UP (ref 10–40)
BASOPHILS # BLD AUTO: 0.03 K/UL — SIGNIFICANT CHANGE UP (ref 0–0.2)
BASOPHILS NFR BLD AUTO: 0.4 % — SIGNIFICANT CHANGE UP (ref 0–2)
BILIRUB SERPL-MCNC: 0.5 MG/DL — SIGNIFICANT CHANGE UP (ref 0.2–1.2)
BUN SERPL-MCNC: 29 MG/DL — HIGH (ref 7–23)
CALCIUM SERPL-MCNC: 11.5 MG/DL — HIGH (ref 8.4–10.5)
CHLORIDE SERPL-SCNC: 113 MMOL/L — HIGH (ref 96–108)
CO2 SERPL-SCNC: 18 MMOL/L — LOW (ref 22–31)
CREAT SERPL-MCNC: 4.17 MG/DL — HIGH (ref 0.5–1.3)
EGFR: 17 ML/MIN/1.73M2 — LOW
EOSINOPHIL # BLD AUTO: 0.27 K/UL — SIGNIFICANT CHANGE UP (ref 0–0.5)
EOSINOPHIL NFR BLD AUTO: 3.2 % — SIGNIFICANT CHANGE UP (ref 0–6)
GLUCOSE SERPL-MCNC: 100 MG/DL — HIGH (ref 70–99)
HCT VFR BLD CALC: 37.9 % — LOW (ref 39–50)
HGB BLD-MCNC: 13.3 G/DL — SIGNIFICANT CHANGE UP (ref 13–17)
IMM GRANULOCYTES NFR BLD AUTO: 0.5 % — SIGNIFICANT CHANGE UP (ref 0–0.9)
LYMPHOCYTES # BLD AUTO: 1.02 K/UL — SIGNIFICANT CHANGE UP (ref 1–3.3)
LYMPHOCYTES # BLD AUTO: 11.9 % — LOW (ref 13–44)
MAGNESIUM SERPL-MCNC: 2 MG/DL — SIGNIFICANT CHANGE UP (ref 1.6–2.6)
MCHC RBC-ENTMCNC: 29.9 PG — SIGNIFICANT CHANGE UP (ref 27–34)
MCHC RBC-ENTMCNC: 35.1 GM/DL — SIGNIFICANT CHANGE UP (ref 32–36)
MCV RBC AUTO: 85.2 FL — SIGNIFICANT CHANGE UP (ref 80–100)
MONOCYTES # BLD AUTO: 0.88 K/UL — SIGNIFICANT CHANGE UP (ref 0–0.9)
MONOCYTES NFR BLD AUTO: 10.3 % — SIGNIFICANT CHANGE UP (ref 2–14)
MRSA PCR RESULT.: NEGATIVE — SIGNIFICANT CHANGE UP
NEUTROPHILS # BLD AUTO: 6.3 K/UL — SIGNIFICANT CHANGE UP (ref 1.8–7.4)
NEUTROPHILS NFR BLD AUTO: 73.7 % — SIGNIFICANT CHANGE UP (ref 43–77)
NRBC # BLD: 0 /100 WBCS — SIGNIFICANT CHANGE UP (ref 0–0)
PHOSPHATE SERPL-MCNC: 1.8 MG/DL — LOW (ref 2.5–4.5)
PLATELET # BLD AUTO: 231 K/UL — SIGNIFICANT CHANGE UP (ref 150–400)
POTASSIUM SERPL-MCNC: 3.4 MMOL/L — LOW (ref 3.5–5.3)
POTASSIUM SERPL-SCNC: 3.4 MMOL/L — LOW (ref 3.5–5.3)
PROT SERPL-MCNC: 5.4 G/DL — LOW (ref 6–8.3)
RBC # BLD: 4.45 M/UL — SIGNIFICANT CHANGE UP (ref 4.2–5.8)
RBC # FLD: 13.7 % — SIGNIFICANT CHANGE UP (ref 10.3–14.5)
S AUREUS DNA NOSE QL NAA+PROBE: NEGATIVE — SIGNIFICANT CHANGE UP
SODIUM SERPL-SCNC: 140 MMOL/L — SIGNIFICANT CHANGE UP (ref 135–145)
VANCOMYCIN TROUGH SERPL-MCNC: 17.6 UG/ML — SIGNIFICANT CHANGE UP (ref 10–20)
WBC # BLD: 8.54 K/UL — SIGNIFICANT CHANGE UP (ref 3.8–10.5)
WBC # FLD AUTO: 8.54 K/UL — SIGNIFICANT CHANGE UP (ref 3.8–10.5)

## 2022-10-15 PROCEDURE — 76536 US EXAM OF HEAD AND NECK: CPT

## 2022-10-15 PROCEDURE — 83935 ASSAY OF URINE OSMOLALITY: CPT

## 2022-10-15 PROCEDURE — 84100 ASSAY OF PHOSPHORUS: CPT

## 2022-10-15 PROCEDURE — 70450 CT HEAD/BRAIN W/O DYE: CPT

## 2022-10-15 PROCEDURE — 84165 PROTEIN E-PHORESIS SERUM: CPT

## 2022-10-15 PROCEDURE — 82310 ASSAY OF CALCIUM: CPT

## 2022-10-15 PROCEDURE — 84300 ASSAY OF URINE SODIUM: CPT

## 2022-10-15 PROCEDURE — 85610 PROTHROMBIN TIME: CPT

## 2022-10-15 PROCEDURE — C8929: CPT

## 2022-10-15 PROCEDURE — 78072 PARATHYRD PLANAR W/SPECT&CT: CPT

## 2022-10-15 PROCEDURE — 96374 THER/PROPH/DIAG INJ IV PUSH: CPT

## 2022-10-15 PROCEDURE — 87640 STAPH A DNA AMP PROBE: CPT

## 2022-10-15 PROCEDURE — 84166 PROTEIN E-PHORESIS/URINE/CSF: CPT

## 2022-10-15 PROCEDURE — 84155 ASSAY OF PROTEIN SERUM: CPT

## 2022-10-15 PROCEDURE — 84156 ASSAY OF PROTEIN URINE: CPT

## 2022-10-15 PROCEDURE — 85025 COMPLETE CBC W/AUTO DIFF WBC: CPT

## 2022-10-15 PROCEDURE — 86901 BLOOD TYPING SEROLOGIC RH(D): CPT

## 2022-10-15 PROCEDURE — 71250 CT THORAX DX C-: CPT

## 2022-10-15 PROCEDURE — 87040 BLOOD CULTURE FOR BACTERIA: CPT

## 2022-10-15 PROCEDURE — 87641 MR-STAPH DNA AMP PROBE: CPT

## 2022-10-15 PROCEDURE — A9500: CPT

## 2022-10-15 PROCEDURE — 83690 ASSAY OF LIPASE: CPT

## 2022-10-15 PROCEDURE — 99285 EMERGENCY DEPT VISIT HI MDM: CPT

## 2022-10-15 PROCEDURE — U0003: CPT

## 2022-10-15 PROCEDURE — 80202 ASSAY OF VANCOMYCIN: CPT

## 2022-10-15 PROCEDURE — 93005 ELECTROCARDIOGRAM TRACING: CPT

## 2022-10-15 PROCEDURE — 83605 ASSAY OF LACTIC ACID: CPT

## 2022-10-15 PROCEDURE — 83970 ASSAY OF PARATHORMONE: CPT

## 2022-10-15 PROCEDURE — 80048 BASIC METABOLIC PNL TOTAL CA: CPT

## 2022-10-15 PROCEDURE — 76700 US EXAM ABDOM COMPLETE: CPT

## 2022-10-15 PROCEDURE — 83036 HEMOGLOBIN GLYCOSYLATED A1C: CPT

## 2022-10-15 PROCEDURE — 83789 MASS SPECTROMETRY QUAL/QUAN: CPT

## 2022-10-15 PROCEDURE — 82306 VITAMIN D 25 HYDROXY: CPT

## 2022-10-15 PROCEDURE — 85730 THROMBOPLASTIN TIME PARTIAL: CPT

## 2022-10-15 PROCEDURE — 84484 ASSAY OF TROPONIN QUANT: CPT

## 2022-10-15 PROCEDURE — 84540 ASSAY OF URINE/UREA-N: CPT

## 2022-10-15 PROCEDURE — 99238 HOSP IP/OBS DSCHRG MGMT 30/<: CPT | Mod: GC

## 2022-10-15 PROCEDURE — 80053 COMPREHEN METABOLIC PANEL: CPT

## 2022-10-15 PROCEDURE — 71045 X-RAY EXAM CHEST 1 VIEW: CPT

## 2022-10-15 PROCEDURE — A9512: CPT

## 2022-10-15 PROCEDURE — 83735 ASSAY OF MAGNESIUM: CPT

## 2022-10-15 PROCEDURE — 82570 ASSAY OF URINE CREATININE: CPT

## 2022-10-15 PROCEDURE — 87635 SARS-COV-2 COVID-19 AMP PRB: CPT

## 2022-10-15 PROCEDURE — U0005: CPT

## 2022-10-15 PROCEDURE — 82652 VIT D 1 25-DIHYDROXY: CPT

## 2022-10-15 PROCEDURE — 86900 BLOOD TYPING SEROLOGIC ABO: CPT

## 2022-10-15 PROCEDURE — 36415 COLL VENOUS BLD VENIPUNCTURE: CPT

## 2022-10-15 PROCEDURE — 81001 URINALYSIS AUTO W/SCOPE: CPT

## 2022-10-15 PROCEDURE — 83519 RIA NONANTIBODY: CPT

## 2022-10-15 PROCEDURE — 82330 ASSAY OF CALCIUM: CPT

## 2022-10-15 PROCEDURE — 86850 RBC ANTIBODY SCREEN: CPT

## 2022-10-15 RX ORDER — RIVAROXABAN 15 MG-20MG
1 KIT ORAL
Qty: 0 | Refills: 0 | DISCHARGE

## 2022-10-15 RX ORDER — SODIUM CHLORIDE 9 MG/ML
1000 INJECTION INTRAMUSCULAR; INTRAVENOUS; SUBCUTANEOUS
Refills: 0 | Status: DISCONTINUED | OUTPATIENT
Start: 2022-10-15 | End: 2022-10-15

## 2022-10-15 RX ORDER — ATORVASTATIN CALCIUM 80 MG/1
1 TABLET, FILM COATED ORAL
Qty: 0 | Refills: 0 | DISCHARGE
Start: 2022-10-15

## 2022-10-15 RX ORDER — AMLODIPINE BESYLATE 2.5 MG/1
1 TABLET ORAL
Qty: 0 | Refills: 0 | DISCHARGE
Start: 2022-10-15

## 2022-10-15 RX ORDER — AMLODIPINE BESYLATE 2.5 MG/1
1 TABLET ORAL
Qty: 0 | Refills: 0 | DISCHARGE

## 2022-10-15 RX ORDER — LISINOPRIL/HYDROCHLOROTHIAZIDE 10-12.5 MG
1 TABLET ORAL
Qty: 0 | Refills: 0 | DISCHARGE

## 2022-10-15 RX ORDER — ASPIRIN/CALCIUM CARB/MAGNESIUM 324 MG
1 TABLET ORAL
Qty: 0 | Refills: 0 | DISCHARGE

## 2022-10-15 RX ORDER — SODIUM CHLORIDE 9 MG/ML
1000 INJECTION INTRAMUSCULAR; INTRAVENOUS; SUBCUTANEOUS
Refills: 0 | Status: DISCONTINUED | OUTPATIENT
Start: 2022-10-16 | End: 2022-10-15

## 2022-10-15 RX ORDER — GABAPENTIN 400 MG/1
1 CAPSULE ORAL
Qty: 0 | Refills: 0 | DISCHARGE

## 2022-10-15 RX ORDER — ACETAMINOPHEN 500 MG
3 TABLET ORAL
Qty: 0 | Refills: 0 | DISCHARGE
Start: 2022-10-15

## 2022-10-15 RX ORDER — LANOLIN ALCOHOL/MO/W.PET/CERES
1 CREAM (GRAM) TOPICAL
Qty: 0 | Refills: 0 | DISCHARGE
Start: 2022-10-15

## 2022-10-15 RX ORDER — EZETIMIBE 10 MG/1
1 TABLET ORAL
Qty: 0 | Refills: 0 | DISCHARGE

## 2022-10-15 RX ORDER — HYDRALAZINE HCL 50 MG
10 TABLET ORAL ONCE
Refills: 0 | Status: COMPLETED | OUTPATIENT
Start: 2022-10-15 | End: 2022-10-15

## 2022-10-15 RX ADMIN — HEPARIN SODIUM 5000 UNIT(S): 5000 INJECTION INTRAVENOUS; SUBCUTANEOUS at 08:35

## 2022-10-15 RX ADMIN — POLYETHYLENE GLYCOL 3350 17 GRAM(S): 17 POWDER, FOR SOLUTION ORAL at 11:28

## 2022-10-15 RX ADMIN — HEPARIN SODIUM 5000 UNIT(S): 5000 INJECTION INTRAVENOUS; SUBCUTANEOUS at 16:04

## 2022-10-15 RX ADMIN — PIPERACILLIN AND TAZOBACTAM 25 GRAM(S): 4; .5 INJECTION, POWDER, LYOPHILIZED, FOR SOLUTION INTRAVENOUS at 11:28

## 2022-10-15 RX ADMIN — HEPARIN SODIUM 5000 UNIT(S): 5000 INJECTION INTRAVENOUS; SUBCUTANEOUS at 00:54

## 2022-10-15 RX ADMIN — Medication 20 MICROGRAM(S): at 06:03

## 2022-10-15 RX ADMIN — PIPERACILLIN AND TAZOBACTAM 25 GRAM(S): 4; .5 INJECTION, POWDER, LYOPHILIZED, FOR SOLUTION INTRAVENOUS at 00:50

## 2022-10-15 RX ADMIN — AMLODIPINE BESYLATE 10 MILLIGRAM(S): 2.5 TABLET ORAL at 06:03

## 2022-10-15 RX ADMIN — Medication 30 MILLILITER(S): at 07:26

## 2022-10-15 RX ADMIN — SODIUM CHLORIDE 200 MILLILITER(S): 9 INJECTION INTRAMUSCULAR; INTRAVENOUS; SUBCUTANEOUS at 06:40

## 2022-10-15 NOTE — DISCHARGE NOTE PROVIDER - NSDCFUSCHEDAPPT_GEN_ALL_CORE_FT
[facility]  NEUROLOGY [address]  Scheduled Appointment: 12/30/2022

## 2022-10-15 NOTE — PROGRESS NOTE ADULT - ASSESSMENT
53 yo M w/ HTN, T2DM presented with nausea and vomiting. Admitted and managed for hypercalcemia. Nephrology consulted for ARPITA w/ sCr 3.34.    #Non-oliguric ARPITA  Pt with no known underlying CKD. Unknown BCr  Cr 3.34 on admission, seems to have plateued now, 4.1 today  ARPITA on the setting of severe hypercalcemia       Plan:   Maintain net even to slightly positive fluid balance  Continue with IVF - however given improvement in renal function and calcium would decrease rate to 150ml/hour  Hold RASSI, HCTZ   BID BMP   Monitor Urine out put   Strict Ins and outs   Switch Xeralto to Eliquis   no acute need for HD at this time       #Severe Hypercalcemia   Likely due to Primary hyperparathyroidism  Ca 111.5 now    Plan:  s/p Pamidronate 60mg over 4 hours on 10/11 (Denosumab not available per pharmacy). Effect is usually seen after 2-4 days. Will not repeat a dose  Continue with IVF - however would decrease rate to 150ml/hour  Maintain net even to slightly positive fluid balance as Hypovolemia exacerbates hypercalcemia  BID BMP   Can also give lasix 40mg x1 if patient shows signs of vol overload   Endocrinology on board; pt will need parathyroidectomy    Juanita Amador D.O  PGY-5 Nephrology

## 2022-10-15 NOTE — PROGRESS NOTE ADULT - SUBJECTIVE AND OBJECTIVE BOX
OVERNIGHT EVENTS:    SUBJECTIVE / INTERVAL HPI: Patient seen and examined at bedside.   VITAL SIGNS:  Vital Signs Last 24 Hrs  T(C): 37.1 (15 Oct 2022 09:32), Max: 37.1 (15 Oct 2022 09:32)  T(F): 98.8 (15 Oct 2022 09:32), Max: 98.8 (15 Oct 2022 09:32)  HR: 92 (15 Oct 2022 11:50) (75 - 92)  BP: 170/91 (15 Oct 2022 11:50) (151/86 - 185/105)  BP(mean): 124 (15 Oct 2022 11:50) (113 - 138)  RR: 22 (15 Oct 2022 11:50) (22 - 26)  SpO2: 95% (15 Oct 2022 11:50) (94% - 96%)    Parameters below as of 15 Oct 2022 11:50  Patient On (Oxygen Delivery Method): room air        PHYSICAL EXAM:    General: NAD, fatigued  HEENT: NCAT  Neck: supple, trachea midline  Cardiovascular: S1, S2 normal; RRR, no M/G/R  Respiratory: CTABL; no W/R/R  Gastrointestinal: soft. no longer tender, ND. no rebound or guarding.   Skin: no ulcerations or visible rashes appreciated  Extremities: WWP; mild 1+ edema UE & LEs b/l. No clubbing or cyanosis  Vascular: 2+ DP pulses.   Neurological: AAOx1-2; lethargic, CN II-XII grossly intact; no focal     MEDICATIONS:  MEDICATIONS  (STANDING):  amLODIPine   Tablet 10 milliGRAM(s) Oral every 24 hours  atorvastatin 80 milliGRAM(s) Oral at bedtime  heparin   Injectable 5000 Unit(s) SubCutaneous every 8 hours  levothyroxine Injectable 20 MICROGram(s) IV Push <User Schedule>  piperacillin/tazobactam IVPB.. 4.5 Gram(s) IV Intermittent every 12 hours  polyethylene glycol 3350 17 Gram(s) Oral daily  senna 2 Tablet(s) Oral at bedtime    MEDICATIONS  (PRN):  acetaminophen     Tablet .. 975 milliGRAM(s) Oral every 6 hours PRN Temp greater or equal to 38C (100.4F), Mild Pain (1 - 3), Moderate Pain (4 - 6)  aluminum hydroxide/magnesium hydroxide/simethicone Suspension 30 milliLiter(s) Oral every 6 hours PRN Dyspepsia  melatonin 3 milliGRAM(s) Oral at bedtime PRN Insomnia  ondansetron Injectable 4 milliGRAM(s) IV Push every 6 hours PRN Nausea and/or Vomiting      ALLERGIES:  Allergies    No Known Allergies    Intolerances        LABS:                        13.3   8.54  )-----------( 231      ( 15 Oct 2022 05:30 )             37.9     10-15    140  |  113<H>  |  29<H>  ----------------------------<  100<H>  3.4<L>   |  18<L>  |  4.17<H>    Ca    11.5<H>      15 Oct 2022 05:30  Phos  1.8     10-15  Mg     2.0     10-15    TPro  5.4<L>  /  Alb  2.8<L>  /  TBili  0.5  /  DBili  x   /  AST  17  /  ALT  11  /  AlkPhos  72  10-15    PT/INR - ( 14 Oct 2022 05:30 )   PT: 18.1 sec;   INR: 1.51          PTT - ( 14 Oct 2022 05:30 )  PTT:32.1 sec    CAPILLARY BLOOD GLUCOSE          RADIOLOGY & ADDITIONAL TESTS: Reviewed.

## 2022-10-15 NOTE — DISCHARGE NOTE PROVIDER - NSDCMRMEDTOKEN_GEN_ALL_CORE_FT
acetaminophen 325 mg oral tablet: 3 tab(s) orally every 6 hours, As needed, Temp greater or equal to 38C (100.4F), Mild Pain (1 - 3), Moderate Pain (4 - 6)  aluminum hydroxide-magnesium hydroxide 200 mg-200 mg/5 mL oral suspension: 30 milliliter(s) orally every 6 hours, As needed, Dyspepsia  amLODIPine 10 mg oral tablet: 1 tab(s) orally every 24 hours  atorvastatin 80 mg oral tablet: 1 tab(s) orally once a day (at bedtime)  levothyroxine 25 mcg (0.025 mg) oral tablet: 1 tab(s) orally once a day  melatonin 3 mg oral tablet: 1 tab(s) orally once a day (at bedtime), As needed, Insomnia  piperacillin-tazobactam: 4.5 gram(s) intravenous every 24 hours  rosuvastatin 40 mg oral tablet: 1 tab(s) orally once a day

## 2022-10-15 NOTE — PROGRESS NOTE ADULT - PROBLEM SELECTOR PLAN 2
- pt acutely febrile to 101F on 10/11 overnight, given tylenol, blood cultures drawn  - started on vanc and zosyn for HAP coverage, given hospitalizations    Plan:   - f/u BCx (NGTD)  - f/u Vanc trough in AM  - continue zosyn 4.5g q12h  - f/u MRSA

## 2022-10-15 NOTE — PROGRESS NOTE ADULT - PROBLEM SELECTOR PROBLEM 1
Hypercalcemia

## 2022-10-15 NOTE — PROGRESS NOTE ADULT - PROBLEM SELECTOR PLAN 6
Per HIE: History of prior strokes in 2018, 2019. Home meds Xarelto, ASA 81mg, Atorvastatin 80mg, & Gabapentin for tingling/numbness  - switched xarelto to eliquis 2.5mg BID, per Renal recs, now switched to heparin 5000u subcu q8h    - c/w high intensity statin therapy  - heparin subcu, will stop the MN before procedure next week  - hold Gabapentin iso ARPITA

## 2022-10-15 NOTE — PROGRESS NOTE ADULT - ASSESSMENT
51 yo M w/ PMHx HTN, T2DM, HLD, Hypothyroidism, CVA presented with 3-4d of multiple NBNB nausea and vomiting, found to have a calcium of 17.1, likely 2/2 to primary hyperparathyroidism, & found to have mediastinal mass, admitted to telemetry for further management of hypercalcemia & ARPITA, and planned for mediastinal mass resection next week.

## 2022-10-15 NOTE — PROGRESS NOTE ADULT - PROVIDER SPECIALTY LIST ADULT
ENT
Endocrinology
Internal Medicine
Nephrology
ENT
Nephrology
Nephrology
Endocrinology
Internal Medicine

## 2022-10-15 NOTE — DISCHARGE NOTE NURSING/CASE MANAGEMENT/SOCIAL WORK - PATIENT PORTAL LINK FT
You can access the FollowMyHealth Patient Portal offered by [facility] by registering at the following website: [email or web address]. By joining [employer]’s FollowMyHealth portal, you will also be able to view your health information using other applications (apps) compatible with our system.

## 2022-10-15 NOTE — PROGRESS NOTE ADULT - PROBLEM SELECTOR PLAN 1
Likely 2/2 hyperparathyroidism   Patient presenting w/ calcium of 17.1. Last Ca 9/3 11 at [facility].   - s/p 1826u calcitonin total, lasix 40mg IVP x1, pamidronate 60mg IVP x1, & IVF  - , Vit D, 25 40.8, Vit D125: 27.5. Ionized Calcium 8.0  - NM PET CT 10/12: 3.2 cm nodule suggestive of an ectopic parathyroid adenoma within mediastinum. Subtle parathyroid hyperplasia or adenomas at the thyroid cannot be excluded.  - US Thyroid 10/13: No parathyroid adenoma is seen.    - Continue q12h BMP, on NS 200cc/hr, strict I/Os  - Renal, Endo, ENT following. Per renal, ok to give lasix 40mg x1 once patient volume repleted  - f/u serum PTH-related peptide & paraneoplastic autoantibody evaluation Likely 2/2 hyperparathyroidism   Patient presenting w/ calcium of 17.1. [facility]   - s/p 1826u calcitonin total, lasix 40mg IVP x1, pamidronate 60mg IVP x1, & IVF  - , Vit D, 25 40.8, Vit D125: 27.5. Ionized Calcium 8.0  - NM PET CT 10/12: 3.2 cm nodule suggestive of an ectopic parathyroid adenoma within mediastinum. Subtle parathyroid hyperplasia or adenomas at the thyroid cannot be excluded.  - US Thyroid 10/13: No parathyroid adenoma is seen.  - Ca improved to 11.5    - Continue q12h BMP, on NS 200cc/hr, strict I/Os  - Renal, Endo, ENT following. Per renal, ok to give lasix 40mg x1 once patient volume repleted  - f/u serum PTH-related peptide & paraneoplastic autoantibody evaluation

## 2022-10-15 NOTE — DISCHARGE NOTE NURSING/CASE MANAGEMENT/SOCIAL WORK - NSDCPEFALRISK_GEN_ALL_CORE
For information on Fall & Injury Prevention, visit: [email or web address] OR  [email or web address] OR  https://www.cdc.gov/steadi/patient.html

## 2022-10-15 NOTE — PROGRESS NOTE ADULT - PROBLEM SELECTOR PLAN 5
CT Chest w/o Contrast 10/11: Thick-walled cystic and/or necrotic mediastinal lesion measuring 3.5 x 2.4 x 3.2 described above, surrounding regional lymphadenopathy or significant fat planes stranding. DDx: bronchopulmonary malformation such as a bronchogenic and esophageal duplication cyst. Cystic degeneration of a malignant mediastinal lesion including lymphoma and necrotic lymphadenopathy.    - endocrine, ENT, and renal following.   - planned for OR next week for mediastinal mass resection with ENT & CT surgery, will f/u CT Chest w/o Contrast 10/11: Thick-walled cystic and/or necrotic mediastinal lesion measuring 3.5 x 2.4 x 3.2 described above, surrounding regional lymphadenopathy or significant fat planes stranding. DDx: bronchopulmonary malformation such as a bronchogenic and esophageal duplication cyst. Cystic degeneration of a malignant mediastinal lesion including lymphoma and necrotic lymphadenopathy.    - endocrine, ENT, and renal following.   - plan for mediastinal mass resection

## 2022-10-15 NOTE — PROGRESS NOTE ADULT - SUBJECTIVE AND OBJECTIVE BOX
Patient is a 52y Male seen and evaluated at bedside. no acute events overnight patient states that he is feeling well- /93 CR down to 4.1 calcium down to 11.5 uop 2.3L/24 hours       Meds:    acetaminophen     Tablet .. 975 every 6 hours PRN  aluminum hydroxide/magnesium hydroxide/simethicone Suspension 30 every 6 hours PRN  amLODIPine   Tablet 10 every 24 hours  atorvastatin 80 at bedtime  heparin   Injectable 5000 every 8 hours  levothyroxine Injectable 20 <User Schedule>  melatonin 3 at bedtime PRN  ondansetron Injectable 4 every 6 hours PRN  piperacillin/tazobactam IVPB.. 4.5 every 12 hours  polyethylene glycol 3350 17 daily  senna 2 at bedtime  sodium chloride 0.9%. 1000 <Continuous>      T(C): , Max: 36.9 (10-15-22 @ 04:30)  T(F): , Max: 98.4 (10-15-22 @ 04:30)  HR: 86 (10-15-22 @ 08:34)  BP: 174/93 (10-15-22 @ 08:34)  BP(mean): 127 (10-15-22 @ 08:34)  RR: 22 (10-15-22 @ 08:34)  SpO2: 95% (10-15-22 @ 08:34)  Wt(kg): --    10-14 @ 07:01  -  10-15 @ 07:00  --------------------------------------------------------  IN: 3400 mL / OUT: 2300 mL / NET: 1100 mL          Review of Systems:  all other ROS negative       PHYSICAL EXAM:  GENERAL:NAD resting comfortably in bed   CHEST/LUNG: CTA no accessory muscle use   HEART: normal S1S2, RRR  ABDOMEN: Soft, Nontender, +BS, No flank tenderness bilateral  EXTREMITIES: No clubbing, cyanosis, or edema         LABS:                        13.3   8.54  )-----------( 231      ( 15 Oct 2022 05:30 )             37.9     10-15    140  |  113<H>  |  29<H>  ----------------------------<  100<H>  3.4<L>   |  18<L>  |  4.17<H>    Ca    11.5<H>      15 Oct 2022 05:30  Phos  1.8     10-15  Mg     2.0     10-15    TPro  5.4<L>  /  Alb  2.8<L>  /  TBili  0.5  /  DBili  x   /  AST  17  /  ALT  11  /  AlkPhos  72  10-15      PT/INR - ( 14 Oct 2022 05:30 )   PT: 18.1 sec;   INR: 1.51          PTT - ( 14 Oct 2022 05:30 )  PTT:32.1 sec    Creatinine, Random Urine: 86 mg/dL (10-14 @ 19:33)  Protein/Creatinine Ratio Calculation: 2.3 Ratio (10-14 @ 19:33)        RADIOLOGY & ADDITIONAL STUDIES:

## 2022-10-15 NOTE — DISCHARGE NOTE PROVIDER - HOSPITAL COURSE
#Discharge: do not delete    51 yo M w/ PMHx HTN, T2DM, HLD, Hypothyroidism, CVA presented with 3-4d of multiple NBNB nausea and vomiting, found to have a calcium of 17.1 likely 2/2 to primary hyperparathyroidism, & found to have mediastinal mass, admitted to telemetry for further management of hypercalcemia & ARPITA with plan for mediastinal mass resection.     Hospital course (by problem):   #Hypercalcemia - Likely 2/2 hyperparathyroidism   - Patient presented w/ calcium of 17.1. Last Ca 9/3 11 at [facility].   - , Vit D, 25 40.8, Vit D125: 27.5. Ionized Calcium 8.0  - s/p 1826u calcitonin total, lasix 40mg IVP x1, pamidronate 60mg IVP x1, & IV fluids  - NM PET CT 10/12: 3.2 cm nodule suggestive of an ectopic parathyroid adenoma within mediastinum. Subtle parathyroid hyperplasia or adenomas at the thyroid cannot be excluded.  - US Thyroid 10/13: No parathyroid adenoma is seen.  - Ca improved to 11.5 on day of discharge, 10/15  - Labs pending: PTH-related peptide, paraneoplastic autoantibody evaluation.    #PNA (pneumonia)   - pt became acutely febrile to 101F on 10/11 overnight, CXR showed possible LLL infiltrate  - started on vanc (dosed by level) and zosyn 4.5g q12 for HAP coverage, given hospitalizations  - BCx 10/12 NGTD.   - Vanc trough 10/15 AM: 17.6  - continue zosyn 4.5g q12h for 7 day course, started 10/12  - continue vancomycin, dosed by level, started 10/12. Last dose 1500mg 10/13 10AM    #Hypertensive urgency.    - pt frequently hypertensive to SBP 180s, given hydral 5mg intermittently, likely 2/2 IV fluids & holding pt's home BP meds  - 10/14 overnight, SBP 180s again, gave hydral 5mg, then 10mg --> /86  - Increased amlodipine to 10mg    #ARPITA (acute kidney injury).   - BUN/Cr ratio: 25/3.34 on admission. Baseline cr 1.55 according to [facility] records  - Cr uptrending, 4.17 today  - FeUrea 100%, suggests intra-renal.  FeNa 5.2%, suggests post-renal, obstructive. No hydronephrosis on renal US.   - per Renal: Holding BP meds    #Mediastinal mass.   - CT Chest w/o Contrast 10/11: Thick-walled cystic and/or necrotic mediastinal lesion measuring 3.5 x 2.4 x 3.2 described above, surrounding regional lymphadenopathy or significant fat planes stranding. DDx: bronchopulmonary malformation such as a bronchogenic and esophageal duplication cyst. Cystic degeneration of a malignant mediastinal lesion including lymphoma and necrotic lymphadenopathy.  - was planned for OR next week for mediastinal mass resection with ENT & CT surgery    #History of stroke  - Per HIE: History of prior strokes in 2018, 2019. Home meds Xarelto, ASA 81mg, Atorvastatin 80mg, & Gabapentin for tingling/numbness  - switched xarelto to eliquis 2.5mg BID, per Renal recs, now switched to heparin 5000u subcu q8h  - c/w high intensity statin therapy  - hold heparin subcu MN before procedure  - hold Gabapentin iso ARPITA.    #HTN (hypertension)  - Patient takes Amlodipine 5mg qd and Lisinopril-hydrochlorothiazide 20-12.5 mg qd.  - holding Lisinopril-HCTZ as can worsen hypercalcemia  - continue amlodipine 10mg qd.    #Hypothyroidism  - continue home med Levothyroxine 25mcg qd.    #Hyperlipidemia  - continue home Rosuvastatin 40mg qd.     #Type 2 diabetes mellitus  -  No known home meds., A1c 6.3, ISS while inpatient    Patient was discharged to: Transfer Service    Changes to old medications: Amlodipine 10mg qd (from 5mg)  Medications that were stopped: Lisinopril-HCTZ 20-12.5mg qd    Physical exam at the time of discharge:  General: NAD, more alert compared to prior exams  HEENT: NCAT  Neck: supple, trachea midline  Cardiovascular: S1, S2 normal; RRR, no M/G/R  Respiratory: CTABL; no W/R/R  Gastrointestinal: soft. no longer tender, ND. no rebound or guarding.   Skin: no ulcerations or visible rashes appreciated  Extremities: WWP; mild 1+ edema UE & LEs b/l. No clubbing or cyanosis  Vascular: 2+ DP pulses.   Neurological: AAOx3, CN II-XII grossly intact; no focal deficits

## 2022-10-15 NOTE — PROGRESS NOTE ADULT - PROBLEM SELECTOR PLAN 4
- BUN/Cr ratio: 25/3.34. Baseline cr 1.55 according to [facility] records  - Cr uptrending, 4.35 today  - FeUrea 100%, suggests intra-renal.  FeNa 5.2%, suggests post-renal, obstructive. No hydronephrosis on renal US.   - per Renal: Holding BP meds     Plan:  - BMP q12h   - f/u further renal recs - BUN/Cr ratio: 25/3.34. Baseline cr 1.55 according to [facility] records  - Cr uptrending, 4.17 today  - FeUrea 100%, suggests intra-renal.  FeNa 5.2%, suggests post-renal, obstructive. No hydronephrosis on renal US.   - per Renal: Holding BP meds     Plan:  - BMP q12h   - f/u further renal recs

## 2022-10-16 LAB — PTH RELATED PROT SERPL-MCNC: <2 PMOL/L — SIGNIFICANT CHANGE UP

## 2022-10-17 LAB
CULTURE RESULTS: SIGNIFICANT CHANGE UP
CULTURE RESULTS: SIGNIFICANT CHANGE UP
PARANEOPLASTIC AB PNL SER: SIGNIFICANT CHANGE UP
SPECIMEN SOURCE: SIGNIFICANT CHANGE UP
SPECIMEN SOURCE: SIGNIFICANT CHANGE UP

## 2022-10-18 ENCOUNTER — APPOINTMENT (OUTPATIENT)
Dept: THORACIC SURGERY | Facility: HOSPITAL | Age: 52
End: 2022-10-18

## 2022-10-21 DIAGNOSIS — I69.951 HEMIPLEGIA AND HEMIPARESIS FOLLOWING UNSPECIFIED CEREBROVASCULAR DISEASE AFFECTING RIGHT DOMINANT SIDE: ICD-10-CM

## 2022-10-21 DIAGNOSIS — Z79.01 LONG TERM (CURRENT) USE OF ANTICOAGULANTS: ICD-10-CM

## 2022-10-21 DIAGNOSIS — E21.0 PRIMARY HYPERPARATHYROIDISM: ICD-10-CM

## 2022-10-21 DIAGNOSIS — E03.9 HYPOTHYROIDISM, UNSPECIFIED: ICD-10-CM

## 2022-10-21 DIAGNOSIS — J18.9 PNEUMONIA, UNSPECIFIED ORGANISM: ICD-10-CM

## 2022-10-21 DIAGNOSIS — I10 ESSENTIAL (PRIMARY) HYPERTENSION: ICD-10-CM

## 2022-10-21 DIAGNOSIS — R11.2 NAUSEA WITH VOMITING, UNSPECIFIED: ICD-10-CM

## 2022-10-21 DIAGNOSIS — Z79.899 OTHER LONG TERM (CURRENT) DRUG THERAPY: ICD-10-CM
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
## 2022-10-21 DIAGNOSIS — I16.0 HYPERTENSIVE URGENCY: ICD-10-CM

## 2022-10-21 DIAGNOSIS — Z79.891 LONG TERM (CURRENT) USE OF OPIATE ANALGESIC: ICD-10-CM

## 2022-10-21 DIAGNOSIS — E11.9 TYPE 2 DIABETES MELLITUS WITHOUT COMPLICATIONS: ICD-10-CM

## 2022-10-21 DIAGNOSIS — Z79.82 LONG TERM (CURRENT) USE OF ASPIRIN: ICD-10-CM

## 2022-10-21 DIAGNOSIS — Z79.890 HORMONE REPLACEMENT THERAPY: ICD-10-CM

## 2022-10-21 DIAGNOSIS — I69.928 OTHER SPEECH AND LANGUAGE DEFICITS FOLLOWING UNSPECIFIED CEREBROVASCULAR DISEASE: ICD-10-CM

## 2022-10-21 DIAGNOSIS — E78.5 HYPERLIPIDEMIA, UNSPECIFIED: ICD-10-CM

## 2022-10-21 DIAGNOSIS — E21.5 DISORDER OF PARATHYROID GLAND, UNSPECIFIED: ICD-10-CM

## 2022-10-21 DIAGNOSIS — D38.3 NEOPLASM OF UNCERTAIN BEHAVIOR OF MEDIASTINUM: ICD-10-CM

## 2022-10-21 DIAGNOSIS — N17.9 ACUTE KIDNEY FAILURE, UNSPECIFIED: ICD-10-CM

## 2022-12-30 ENCOUNTER — APPOINTMENT (OUTPATIENT)
Dept: NEUROLOGY | Facility: CLINIC | Age: 52
End: 2022-12-30

## 2023-08-28 ENCOUNTER — NON-APPOINTMENT (OUTPATIENT)
Age: 53
End: 2023-08-28

## 2023-08-31 ENCOUNTER — NON-APPOINTMENT (OUTPATIENT)
Age: 53
End: 2023-08-31

## 2023-08-31 ENCOUNTER — APPOINTMENT (OUTPATIENT)
Dept: UROLOGY | Facility: CLINIC | Age: 53
End: 2023-08-31
Payer: MEDICAID

## 2023-08-31 VITALS
WEIGHT: 207 LBS | DIASTOLIC BLOOD PRESSURE: 100 MMHG | SYSTOLIC BLOOD PRESSURE: 151 MMHG | HEART RATE: 108 BPM | BODY MASS INDEX: 31.37 KG/M2 | TEMPERATURE: 98 F | HEIGHT: 68 IN

## 2023-08-31 DIAGNOSIS — N52.01 ERECTILE DYSFUNCTION DUE TO ARTERIAL INSUFFICIENCY: ICD-10-CM

## 2023-08-31 DIAGNOSIS — Z00.00 ENCOUNTER FOR GENERAL ADULT MEDICAL EXAMINATION W/OUT ABNORMAL FINDINGS: ICD-10-CM

## 2023-08-31 DIAGNOSIS — R39.15 BENIGN PROSTATIC HYPERPLASIA WITH LOWER URINARY TRACT SYMPMS: ICD-10-CM

## 2023-08-31 DIAGNOSIS — N40.1 BENIGN PROSTATIC HYPERPLASIA WITH LOWER URINARY TRACT SYMPMS: ICD-10-CM

## 2023-08-31 PROCEDURE — 99204 OFFICE O/P NEW MOD 45 MIN: CPT

## 2023-08-31 RX ORDER — TAMSULOSIN HYDROCHLORIDE 0.4 MG/1
0.4 CAPSULE ORAL
Qty: 30 | Refills: 11 | Status: ACTIVE | COMMUNITY
Start: 2023-08-31 | End: 1900-01-01

## 2023-08-31 RX ORDER — SILDENAFIL 100 MG/1
100 TABLET, FILM COATED ORAL
Qty: 6 | Refills: 11 | Status: ACTIVE | COMMUNITY
Start: 2023-08-31 | End: 1900-01-01

## 2023-08-31 NOTE — ASSESSMENT
[FreeTextEntry1] : 53M here for establish care for ED and BPH  ED - trial sildenafil 100 mg   BPH  - trial tamsulosin 0.4 MG - UA, UCx, PSA f/u 6 weeks

## 2023-08-31 NOTE — PHYSICAL EXAM
[Normal Appearance] : normal appearance [General Appearance - In No Acute Distress] : no acute distress [] : no respiratory distress [Respiration, Rhythm And Depth] : normal respiratory rhythm and effort [Exaggerated Use Of Accessory Muscles For Inspiration] : no accessory muscle use [Abdomen Soft] : soft [Abdomen Tenderness] : non-tender [Abdomen Hernia] : no hernia was discovered [Normal Station and Gait] : the gait and station were normal for the patient's age [Oriented To Time, Place, And Person] : oriented to person, place, and time [Affect] : the affect was normal [FreeTextEntry1] : b/l LE edema

## 2023-08-31 NOTE — HISTORY OF PRESENT ILLNESS
[FreeTextEntry1] : REKHA PALACIO is a 53 year M with PMHx of stroke x 1 year ago, thyroid cancer, s/p resection presenting for ED on 08/31/2023  He reports he has been having <6/10 erections since the time of his stroke. PDE5-i naive. also endorses increased urination as well as urgency. patient reports some hesitancy and very weak stream.   He denies hematuria, dysuria, incontinence, fever, chills  Social history; denies  Family history: maternal - ovarian ca,   Allergies: NKDA

## 2023-08-31 NOTE — END OF VISIT
[FreeTextEntry3] : I have seen and evaluated the patient and formulated a plan of care after the NP/resident or fellow saw the patient. I have edited the note above to reflect my recommendations and agree with the documentation and plan as set forth.

## 2023-09-11 LAB
APPEARANCE: CLEAR
BACTERIA UR CULT: NORMAL
BACTERIA: NEGATIVE /HPF
BILIRUBIN URINE: NEGATIVE
BLOOD URINE: NEGATIVE
CAST: 0 /LPF
COLOR: YELLOW
EPITHELIAL CELLS: 0 /HPF
GLUCOSE QUALITATIVE U: 100 MG/DL
KETONES URINE: NEGATIVE MG/DL
LEUKOCYTE ESTERASE URINE: NEGATIVE
MICROSCOPIC-UA: NORMAL
NITRITE URINE: NEGATIVE
PH URINE: 7.5
PROTEIN URINE: 300 MG/DL
RED BLOOD CELLS URINE: 0 /HPF
SPECIFIC GRAVITY URINE: 1.02
UROBILINOGEN URINE: 0.2 MG/DL
WHITE BLOOD CELLS URINE: 0 /HPF

## 2023-10-10 ENCOUNTER — APPOINTMENT (OUTPATIENT)
Dept: UROLOGY | Facility: CLINIC | Age: 53
End: 2023-10-10

## 2024-10-29 ENCOUNTER — TRANSCRIPTION ENCOUNTER (OUTPATIENT)
Age: 54
End: 2024-10-29

## 2024-10-29 ENCOUNTER — APPOINTMENT (OUTPATIENT)
Dept: TRANSPLANT | Facility: CLINIC | Age: 54
End: 2024-10-29
Payer: COMMERCIAL

## 2024-10-29 ENCOUNTER — APPOINTMENT (OUTPATIENT)
Dept: NEPHROLOGY | Facility: CLINIC | Age: 54
End: 2024-10-29
Payer: COMMERCIAL

## 2024-10-29 VITALS
TEMPERATURE: 97.9 F | DIASTOLIC BLOOD PRESSURE: 89 MMHG | OXYGEN SATURATION: 97 % | HEIGHT: 68 IN | BODY MASS INDEX: 35.77 KG/M2 | WEIGHT: 236 LBS | SYSTOLIC BLOOD PRESSURE: 137 MMHG | HEART RATE: 78 BPM

## 2024-10-29 DIAGNOSIS — N18.4 CHRONIC KIDNEY DISEASE, STAGE 4 (SEVERE): ICD-10-CM

## 2024-10-29 PROCEDURE — 99215 OFFICE O/P EST HI 40 MIN: CPT

## 2024-10-29 PROCEDURE — 99204 OFFICE O/P NEW MOD 45 MIN: CPT

## 2024-10-30 ENCOUNTER — NON-APPOINTMENT (OUTPATIENT)
Age: 54
End: 2024-10-30

## 2024-10-30 PROBLEM — N18.4 CKD (CHRONIC KIDNEY DISEASE) STAGE 4, GFR 15-29 ML/MIN: Status: ACTIVE | Noted: 2024-10-30

## 2024-11-01 ENCOUNTER — NON-APPOINTMENT (OUTPATIENT)
Age: 54
End: 2024-11-01

## 2024-11-01 LAB
ABO + RH PNL BLD: NORMAL
ABO + RH PNL BLD: NORMAL
ALBUMIN SERPL ELPH-MCNC: 4.2 G/DL
ALP BLD-CCNC: 139 U/L
ALT SERPL-CCNC: 13 U/L
ANION GAP SERPL CALC-SCNC: 19 MMOL/L
APPEARANCE: CLEAR
AST SERPL-CCNC: 11 U/L
BACTERIA: NEGATIVE /HPF
BASOPHILS # BLD AUTO: 0.04 K/UL
BASOPHILS NFR BLD AUTO: 0.4 %
BILIRUB SERPL-MCNC: 0.2 MG/DL
BILIRUBIN URINE: NEGATIVE
BLOOD URINE: NEGATIVE
BUN SERPL-MCNC: 52 MG/DL
C PEPTIDE SERPL-MCNC: 6.1 NG/ML
CALCIUM SERPL-MCNC: 9.6 MG/DL
CAST: 1 /LPF
CHLORIDE SERPL-SCNC: 100 MMOL/L
CHOLEST SERPL-MCNC: 185 MG/DL
CMV IGG SERPL QL: >10 U/ML
CMV IGG SERPL-IMP: POSITIVE
CO2 SERPL-SCNC: 22 MMOL/L
COLOR: YELLOW
COVID-19 SPIKE DOMAIN ANTIBODY INTERPRETATION: POSITIVE
CREAT SERPL-MCNC: 4.36 MG/DL
CREAT SPEC-SCNC: 58 MG/DL
CREAT/PROT UR: 2.1 RATIO
EBV DNA SERPL NAA+PROBE-ACNC: NOT DETECTED IU/ML
EBV EA AB SER IA-ACNC: 5.22 U/ML
EBV EA AB TITR SER IF: POSITIVE
EBV EA IGG SER QL IA: 267 U/ML
EBV EA IGG SER-ACNC: NEGATIVE
EBV EA IGM SER IA-ACNC: NEGATIVE
EBV PATRN SPEC IB-IMP: NORMAL
EBV VCA IGG SER IA-ACNC: 265 U/ML
EBV VCA IGM SER QL IA: <10 U/ML
EBVPCR LOG: NOT DETECTED LOG10IU/ML
EGFR: 15 ML/MIN/1.73M2
EOSINOPHIL # BLD AUTO: 0.19 K/UL
EOSINOPHIL NFR BLD AUTO: 1.8 %
EPITHELIAL CELLS: 0 /HPF
EPSTEIN-BARR VIRUS CAPSID ANTIGEN IGG: POSITIVE
ESTIMATED AVERAGE GLUCOSE: 171 MG/DL
GLUCOSE QUALITATIVE U: >=1000 MG/DL
GLUCOSE SERPL-MCNC: 155 MG/DL
HAV IGM SER QL: NONREACTIVE
HBA1C MFR BLD HPLC: 7.6 %
HBV CORE IGG+IGM SER QL: NONREACTIVE
HBV SURFACE AB SER QL: NONREACTIVE
HBV SURFACE AB SERPL IA-ACNC: <3 MIU/ML
HBV SURFACE AG SER QL: NONREACTIVE
HCT VFR BLD CALC: 43.9 %
HCV AB SER QL: NONREACTIVE
HCV S/CO RATIO: 0.12 S/CO
HDLC SERPL-MCNC: 41 MG/DL
HGB BLD-MCNC: 14.6 G/DL
HIV1+2 AB SPEC QL IA.RAPID: NONREACTIVE
HSV 1+2 IGG SER IA-IMP: NEGATIVE
HSV 1+2 IGG SER IA-IMP: POSITIVE
HSV1 IGG SER QL: 28 INDEX
HSV2 IGG SER QL: 0.08 INDEX
IMM GRANULOCYTES NFR BLD AUTO: 0.4 %
KETONES URINE: NEGATIVE MG/DL
LDLC SERPL CALC-MCNC: 113 MG/DL
LEUKOCYTE ESTERASE URINE: NEGATIVE
LYMPHOCYTES # BLD AUTO: 1.97 K/UL
LYMPHOCYTES NFR BLD AUTO: 18.5 %
MAGNESIUM SERPL-MCNC: 2.5 MG/DL
MAN DIFF?: NORMAL
MCHC RBC-ENTMCNC: 28.5 PG
MCHC RBC-ENTMCNC: 33.3 G/DL
MCV RBC AUTO: 85.6 FL
MICROSCOPIC-UA: NORMAL
MONOCYTES # BLD AUTO: 0.88 K/UL
MONOCYTES NFR BLD AUTO: 8.3 %
NEUTROPHILS # BLD AUTO: 7.52 K/UL
NEUTROPHILS NFR BLD AUTO: 70.6 %
NITRITE URINE: NEGATIVE
NONHDLC SERPL-MCNC: 144 MG/DL
PH URINE: 5.5
PHOSPHATE SERPL-MCNC: 5.1 MG/DL
PLATELET # BLD AUTO: 368 K/UL
POTASSIUM SERPL-SCNC: 4.3 MMOL/L
PROT SERPL-MCNC: 7.7 G/DL
PROT UR-MCNC: 119 MG/DL
PROTEIN URINE: 100 MG/DL
PSA SERPL-MCNC: 0.51 NG/ML
RBC # BLD: 5.13 M/UL
RBC # FLD: 14.3 %
RED BLOOD CELLS URINE: 0 /HPF
RUBV IGG FLD-ACNC: 7.2 INDEX
RUBV IGG SER-IMP: POSITIVE
SARS-COV-2 AB SERPL IA-ACNC: >250 U/ML
SODIUM SERPL-SCNC: 142 MMOL/L
SPECIFIC GRAVITY URINE: 1.01
STRONGYLOIDES AB SER IA-ACNC: NEGATIVE
T GONDII AB SER-IMP: NEGATIVE
T GONDII IGG SER QL: <3 IU/ML
T PALLIDUM AB SER QL IA: NEGATIVE
TRIGL SERPL-MCNC: 173 MG/DL
URATE SERPL-MCNC: 9.5 MG/DL
UROBILINOGEN URINE: 0.2 MG/DL
VZV AB TITR SER: POSITIVE
VZV IGG SER IF-ACNC: 24.9 S/CO
WBC # FLD AUTO: 10.64 K/UL
WHITE BLOOD CELLS URINE: 0 /HPF

## 2024-11-04 LAB
M TB IFN-G BLD-IMP: NEGATIVE
QUANTIFERON TB PLUS MITOGEN MINUS NIL: >10 IU/ML
QUANTIFERON TB PLUS NIL: 0.02 IU/ML
QUANTIFERON TB PLUS TB1 MINUS NIL: 0.05 IU/ML
QUANTIFERON TB PLUS TB2 MINUS NIL: 0.03 IU/ML

## 2024-11-05 ENCOUNTER — NON-APPOINTMENT (OUTPATIENT)
Age: 54
End: 2024-11-05

## 2024-11-05 ENCOUNTER — APPOINTMENT (OUTPATIENT)
Dept: CARDIOLOGY | Facility: CLINIC | Age: 54
End: 2024-11-05
Payer: COMMERCIAL

## 2024-11-05 VITALS
SYSTOLIC BLOOD PRESSURE: 126 MMHG | BODY MASS INDEX: 35.46 KG/M2 | HEART RATE: 88 BPM | WEIGHT: 234 LBS | HEIGHT: 68 IN | OXYGEN SATURATION: 94 % | DIASTOLIC BLOOD PRESSURE: 86 MMHG

## 2024-11-05 DIAGNOSIS — I10 ESSENTIAL (PRIMARY) HYPERTENSION: ICD-10-CM

## 2024-11-05 DIAGNOSIS — Z01.818 ENCOUNTER FOR OTHER PREPROCEDURAL EXAMINATION: ICD-10-CM

## 2024-11-05 DIAGNOSIS — E78.5 HYPERLIPIDEMIA, UNSPECIFIED: ICD-10-CM

## 2024-11-05 DIAGNOSIS — Z79.4 TYPE 2 DIABETES MELLITUS WITH DIABETIC CHRONIC KIDNEY DISEASE: ICD-10-CM

## 2024-11-05 DIAGNOSIS — E11.22 TYPE 2 DIABETES MELLITUS WITH DIABETIC CHRONIC KIDNEY DISEASE: ICD-10-CM

## 2024-11-05 PROCEDURE — 99215 OFFICE O/P EST HI 40 MIN: CPT

## 2024-11-05 PROCEDURE — 93000 ELECTROCARDIOGRAM COMPLETE: CPT

## 2024-11-05 RX ORDER — APIXABAN 2.5 MG/1
2.5 TABLET, FILM COATED ORAL
Qty: 180 | Refills: 0 | Status: ACTIVE | COMMUNITY
Start: 2024-11-05

## 2024-11-13 ENCOUNTER — APPOINTMENT (OUTPATIENT)
Dept: CT IMAGING | Facility: CLINIC | Age: 54
End: 2024-11-13
Payer: COMMERCIAL

## 2024-11-13 ENCOUNTER — APPOINTMENT (OUTPATIENT)
Dept: ULTRASOUND IMAGING | Facility: CLINIC | Age: 54
End: 2024-11-13
Payer: COMMERCIAL

## 2024-11-13 PROCEDURE — 93880 EXTRACRANIAL BILAT STUDY: CPT

## 2024-11-13 PROCEDURE — 71250 CT THORAX DX C-: CPT

## 2024-11-13 PROCEDURE — 74176 CT ABD & PELVIS W/O CONTRAST: CPT

## 2024-11-13 PROCEDURE — 70450 CT HEAD/BRAIN W/O DYE: CPT

## 2024-11-22 ENCOUNTER — NON-APPOINTMENT (OUTPATIENT)
Age: 54
End: 2024-11-22

## 2024-12-04 ENCOUNTER — APPOINTMENT (OUTPATIENT)
Dept: CARDIOLOGY | Facility: CLINIC | Age: 54
End: 2024-12-04
Payer: COMMERCIAL

## 2024-12-04 PROCEDURE — 93306 TTE W/DOPPLER COMPLETE: CPT

## 2024-12-04 PROCEDURE — A9500: CPT

## 2024-12-04 PROCEDURE — 93015 CV STRESS TEST SUPVJ I&R: CPT

## 2024-12-04 PROCEDURE — 78452 HT MUSCLE IMAGE SPECT MULT: CPT

## 2024-12-31 ENCOUNTER — NON-APPOINTMENT (OUTPATIENT)
Age: 54
End: 2024-12-31

## 2025-01-28 ENCOUNTER — NON-APPOINTMENT (OUTPATIENT)
Age: 55
End: 2025-01-28

## 2025-02-03 NOTE — CONSULT NOTE ADULT - ATTENDING COMMENTS
Sent   I:   Cr 3.46, calcium 17 on admission.   A: ARPITA with hypercalcemia likely due to primary hyperpara and HCTZ.   P: No indications for dialysis. Follow SCr. Fluids, lasix PRN, calcitonin, prolia x 1. Endo consult.

## 2025-03-18 ENCOUNTER — NON-APPOINTMENT (OUTPATIENT)
Age: 55
End: 2025-03-18

## 2025-04-10 ENCOUNTER — NON-APPOINTMENT (OUTPATIENT)
Age: 55
End: 2025-04-10

## 2025-04-22 ENCOUNTER — NON-APPOINTMENT (OUTPATIENT)
Age: 55
End: 2025-04-22

## 2025-04-23 DIAGNOSIS — Z01.818 ENCOUNTER FOR OTHER PREPROCEDURAL EXAMINATION: ICD-10-CM

## 2025-04-29 ENCOUNTER — LABORATORY RESULT (OUTPATIENT)
Age: 55
End: 2025-04-29

## 2025-04-29 ENCOUNTER — APPOINTMENT (OUTPATIENT)
Dept: TRANSPLANT | Facility: CLINIC | Age: 55
End: 2025-04-29
Payer: COMMERCIAL

## 2025-04-29 ENCOUNTER — OUTPATIENT (OUTPATIENT)
Dept: OUTPATIENT SERVICES | Facility: HOSPITAL | Age: 55
LOS: 1 days | End: 2025-04-29
Payer: MEDICAID

## 2025-04-29 VITALS
HEIGHT: 68 IN | DIASTOLIC BLOOD PRESSURE: 84 MMHG | TEMPERATURE: 98.5 F | WEIGHT: 223 LBS | BODY MASS INDEX: 33.8 KG/M2 | OXYGEN SATURATION: 95 % | HEART RATE: 85 BPM | SYSTOLIC BLOOD PRESSURE: 122 MMHG

## 2025-04-29 VITALS
WEIGHT: 223.11 LBS | HEART RATE: 83 BPM | HEIGHT: 68 IN | OXYGEN SATURATION: 98 % | SYSTOLIC BLOOD PRESSURE: 119 MMHG | TEMPERATURE: 98 F | RESPIRATION RATE: 16 BRPM | DIASTOLIC BLOOD PRESSURE: 79 MMHG

## 2025-04-29 DIAGNOSIS — Z01.818 ENCOUNTER FOR OTHER PREPROCEDURAL EXAMINATION: ICD-10-CM

## 2025-04-29 DIAGNOSIS — Z98.890 OTHER SPECIFIED POSTPROCEDURAL STATES: Chronic | ICD-10-CM

## 2025-04-29 DIAGNOSIS — G47.33 OBSTRUCTIVE SLEEP APNEA (ADULT) (PEDIATRIC): ICD-10-CM

## 2025-04-29 DIAGNOSIS — I63.9 CEREBRAL INFARCTION, UNSPECIFIED: ICD-10-CM

## 2025-04-29 DIAGNOSIS — J39.8 OTHER SPECIFIED DISEASES OF UPPER RESPIRATORY TRACT: Chronic | ICD-10-CM

## 2025-04-29 DIAGNOSIS — E11.9 TYPE 2 DIABETES MELLITUS WITHOUT COMPLICATIONS: ICD-10-CM

## 2025-04-29 LAB
BLD GP AB SCN SERPL QL: NEGATIVE — SIGNIFICANT CHANGE UP
RH IG SCN BLD-IMP: POSITIVE — SIGNIFICANT CHANGE UP

## 2025-04-29 PROCEDURE — 86850 RBC ANTIBODY SCREEN: CPT

## 2025-04-29 PROCEDURE — G0463: CPT

## 2025-04-29 PROCEDURE — 99215 OFFICE O/P EST HI 40 MIN: CPT

## 2025-04-29 PROCEDURE — 86901 BLOOD TYPING SEROLOGIC RH(D): CPT

## 2025-04-29 PROCEDURE — 86900 BLOOD TYPING SEROLOGIC ABO: CPT

## 2025-04-29 RX ORDER — NIFEDIPINE 30 MG
1 TABLET, EXTENDED RELEASE 24 HR ORAL
Refills: 0 | DISCHARGE

## 2025-04-29 RX ORDER — BUPROPION HYDROBROMIDE 522 MG/1
1 TABLET, EXTENDED RELEASE ORAL
Refills: 0 | DISCHARGE

## 2025-04-29 RX ORDER — LIDOCAINE HCL/PF 10 MG/ML
0.2 VIAL (ML) INJECTION ONCE
Refills: 0 | Status: DISCONTINUED | OUTPATIENT
Start: 2025-05-20 | End: 2025-05-20

## 2025-04-29 NOTE — H&P PST ADULT - ATTENDING COMMENTS
for living donor kidney transplant  risks, benefits alternatives and possible complications discussed including but not limited to risk of bleeding, urine leak, need for reoperation, delayed function, vascular complications, and need for dialysis all discussed.

## 2025-04-29 NOTE — H&P PST ADULT - PROBLEM SELECTOR PLAN 1
Pt is scheduled for an open living kidney transplant ot the right side, possible left with Dr. Webber on 5/20/25  T/S ordered and obtained at PST  ABO, stat venous K DOS  Pt going to transplant center after PST for appt  Chlorhexidine soap and instructions reviewed and provided to pt with teach back understanding

## 2025-04-29 NOTE — H&P PST ADULT - HISTORY OF PRESENT ILLNESS
55 year old male with PMH HTN, HLD, DMT2 (on Mounjaro and Farxiga, managed by PCP, reports most recent HGA1C around 6%), childhood asthma (no intubations), HOLLAND (noncompliant with CPAP), hypothyroidism and CVA (around 2018/2019 - no residual deficit s/p loop recorder placement and Eliquis BID) with recurrent TIAs (2020), paratracheal nodule s/p removal (benign, 2 years ago at [facility], no difficulty swallowing, unsure of surgeon's name) and CKD (eGFR 16, not currently on dialysis) presents to PST for scheduled open living kidney transplant ot the right side, possible left with Dr. Webber on 5/20/25. 55 year old male with PMH HTN, HLD, DMT2 (on Mounjaro and Farxiga, managed by PCP, reports most recent HGA1C around 6%, reports lantus and humalog insulin were d/c'ed and mounjaro was added), childhood asthma (no intubations/no longer uses inhaler), HOLLAND (noncompliant with CPAP), hypothyroidism, depression, CVA (around 2018/2019 - residual parasthesias s/p loop recorder placement and Eliquis BID) with recurrent TIAs (2020), paratracheal nodule s/p removal (benign, 2 years ago at [facility], no difficulty swallowing, unsure of surgeon's name) and CKD (eGFR 16, not currently on dialysis) presents to PST for scheduled open living kidney transplant ot the right side, possible left with Dr. Webber on 5/20/25.

## 2025-04-29 NOTE — H&P PST ADULT - NSICDXPASTMEDICALHX_GEN_ALL_CORE_FT
PAST MEDICAL HISTORY:  Childhood asthma     Chronic kidney disease, unspecified     Diabetes     History of TIAs     HTN (hypertension)     Hyperlipidemia     Hypothyroidism     HOLLAND (obstructive sleep apnea)     Stroke      PAST MEDICAL HISTORY:  Childhood asthma     Chronic kidney disease, unspecified     Diabetes     History of TIAs     HTN (hypertension)     Hyperlipidemia     Hypothyroidism     Numbness and tingling     HOLLAND (obstructive sleep apnea)     Stroke

## 2025-04-29 NOTE — H&P PST ADULT - ASSESSMENT
DASI score: 5.72  DASI activity: Able to walk 2-3 blocks, ADLs, Grocery Shopping, 1 Flight of Stairs, works for a [employer] company  Loose teeth or denture: denies, missing teeth    CAPRINI SCORE    AGE RELATED RISK FACTORS                                                             [ X] Age 41-60 years                                            (1 Point)  [ ] Age: 61-74 years                                           (2 Points)                 [ ] Age= 75 years                                                (3 Points)             DISEASE RELATED RISK FACTORS                                                       [ ] Edema in the lower extremities                 (1 Point)                     [ ] Varicose veins                                               (1 Point)                                 [X ] BMI > 25 Kg/m2                                            (1 Point)                                  [ ] Serious infection (ie PNA)                            (1 Point)                     [ ] Lung disease ( COPD, Emphysema)            (1 Point)                                                                          [ ] Acute myocardial infarction                         (1 Point)                  [ ] Congestive heart failure (in the previous month)  (1 Point)         [ ] Inflammatory bowel disease                            (1 Point)                  [ ] Central venous access, PICC or Port               (2 points)       (within the last month)                                                                [ ] Stroke (in the previous month)                        (5 Points)    [ ] Previous or present malignancy                       (2 points)                                                                                                                                                         HEMATOLOGY RELATED FACTORS                                                         [ ] Prior episodes of VTE                                     (3 Points)                     [ ] Positive family history for VTE                      (3 Points)                  [ ] Prothrombin 81048 A                                     (3 Points)                     [ ] Factor V Leiden                                                (3 Points)                        [ ] Lupus anticoagulants                                      (3 Points)                                                           [ ] Anticardiolipin antibodies                              (3 Points)                                                       [ ] High homocysteine in the blood                   (3 Points)                                             [ ] Other congenital or acquired thrombophilia      (3 Points)                                                [ ] Heparin induced thrombocytopenia                  (3 Points)                                        MOBILITY RELATED FACTORS  [ ] Bed rest                                                         (1 Point)  [ ] Plaster cast                                                    (2 points)  [ ] Bed bound for more than 72 hours           (2 Points)    GENDER SPECIFIC FACTORS  [ ] Pregnancy or had a baby within the last month   (1 Point)  [ ] Post-partum < 6 weeks                                   (1 Point)  [ ] Hormonal therapy  or oral contraception   (1 Point)  [ ] History of pregnancy complications              (1 point)  [ ] Unexplained or recurrent              (1 Point)    OTHER RISK FACTORS                                           (1 Point)  [ ] BMI >40, smoking, diabetes requiring insulin, chemotherapy  blood transfusions and length of surgery over 2 hours    SURGERY RELATED RISK FACTORS  [ ]  Section within the last month     (1 Point)  [ ] Minor surgery                                                  (1 Point)  [ ] Arthroscopic surgery                                       (2 Points)  [ X] Planned major surgery lasting more            (2 Points)      than 45 minutes     [ ] Elective hip or knee joint replacement       (5 points)       surgery                                                TRAUMA RELATED RISK FACTORS  [ ] Fracture of the hip, pelvis, or leg                       (5 Points)  [ ] Spinal cord injury resulting in paralysis             (5 points)       (in the previous month)    [ ] Paralysis  (less than 1 month)                             (5 Points)  [ ] Multiple Trauma within 1 month                        (5 Points)    Total Score [  4      ]    Caprini Score 0-2: Low Risk, NO VTE prophylaxis required for most patients, encourage ambulation  Caprini Score 3-6: Moderate Risk , pharmacologic VTE prophylaxis is indicated for most patients (in the absence of contraindications)  Caprini Score Greater than or =7: High risk, pharmocologic VTE prophylaxis indicated for most patients (in the absence of contraindications) DASI score: 5.72  DASI activity: Able to walk 2-3 blocks, ADLs, Grocery Shopping, 1 Flight of Stairs, works for a [employer] company  Loose teeth or denture: denies, missing teeth    CAPRINI SCORE    AGE RELATED RISK FACTORS                                                             [ X] Age 41-60 years                                            (1 Point)  [ ] Age: 61-74 years                                           (2 Points)                 [ ] Age= 75 years                                                (3 Points)             DISEASE RELATED RISK FACTORS                                                       [X ] Edema in the lower extremities                 (1 Point)                     [ ] Varicose veins                                               (1 Point)                                 [X ] BMI > 25 Kg/m2                                            (1 Point)                                  [ ] Serious infection (ie PNA)                            (1 Point)                     [ ] Lung disease ( COPD, Emphysema)            (1 Point)                                                                          [ ] Acute myocardial infarction                         (1 Point)                  [ ] Congestive heart failure (in the previous month)  (1 Point)         [ ] Inflammatory bowel disease                            (1 Point)                  [ ] Central venous access, PICC or Port               (2 points)       (within the last month)                                                                [ ] Stroke (in the previous month)                        (5 Points)    [ ] Previous or present malignancy                       (2 points)                                                                                                                                                         HEMATOLOGY RELATED FACTORS                                                         [ ] Prior episodes of VTE                                     (3 Points)                     [ ] Positive family history for VTE                      (3 Points)                  [ ] Prothrombin 72820 A                                     (3 Points)                     [ ] Factor V Leiden                                                (3 Points)                        [ ] Lupus anticoagulants                                      (3 Points)                                                           [ ] Anticardiolipin antibodies                              (3 Points)                                                       [ ] High homocysteine in the blood                   (3 Points)                                             [ ] Other congenital or acquired thrombophilia      (3 Points)                                                [ ] Heparin induced thrombocytopenia                  (3 Points)                                        MOBILITY RELATED FACTORS  [ ] Bed rest                                                         (1 Point)  [ ] Plaster cast                                                    (2 points)  [ ] Bed bound for more than 72 hours           (2 Points)    GENDER SPECIFIC FACTORS  [ ] Pregnancy or had a baby within the last month   (1 Point)  [ ] Post-partum < 6 weeks                                   (1 Point)  [ ] Hormonal therapy  or oral contraception   (1 Point)  [ ] History of pregnancy complications              (1 point)  [ ] Unexplained or recurrent              (1 Point)    OTHER RISK FACTORS                                           (1 Point)  [ ] BMI >40, smoking, diabetes requiring insulin, chemotherapy  blood transfusions and length of surgery over 2 hours    SURGERY RELATED RISK FACTORS  [ ]  Section within the last month     (1 Point)  [ ] Minor surgery                                                  (1 Point)  [ ] Arthroscopic surgery                                       (2 Points)  [ X] Planned major surgery lasting more            (2 Points)      than 45 minutes     [ ] Elective hip or knee joint replacement       (5 points)       surgery                                                TRAUMA RELATED RISK FACTORS  [ ] Fracture of the hip, pelvis, or leg                       (5 Points)  [ ] Spinal cord injury resulting in paralysis             (5 points)       (in the previous month)    [ ] Paralysis  (less than 1 month)                             (5 Points)  [ ] Multiple Trauma within 1 month                        (5 Points)    Total Score [  5  ]    Caprini Score 0-2: Low Risk, NO VTE prophylaxis required for most patients, encourage ambulation  Caprini Score 3-6: Moderate Risk , pharmacologic VTE prophylaxis is indicated for most patients (in the absence of contraindications)  Caprini Score Greater than or =7: High risk, pharmocologic VTE prophylaxis indicated for most patients (in the absence of contraindications)

## 2025-04-29 NOTE — H&P PST ADULT - CARDIOVASCULAR
details… normal/regular rate and rhythm/S1 S2 present/no gallops/no rub/no murmur 2+ peripheral pitting edema/normal/regular rate and rhythm/S1 S2 present/no gallops/no rub/no murmur/peripheral edema

## 2025-04-29 NOTE — H&P PST ADULT - PROBLEM SELECTOR PLAN 2
Pt denies any nausea, vomiting or early satiety with GLP-1 agonist medication. Pt advised to hold Mounjaro 1 week preoperatively with last dose on 5/9/25 AM  Hold Farxiga 4 days preoperatively with last dose on 5/15/25 AM dose  FS on admit

## 2025-04-29 NOTE — H&P PST ADULT - LAST CARDIAC ANGIOGRAM/IMAGING
possibly a few years ago at [facility], unsure of indication, no cardiac stents placed

## 2025-04-29 NOTE — H&P PST ADULT - TEMPERATURE IN CELSIUS (DEGREES C)
Caller would like to discuss an/a Order. Writer advised caller of callback within 24-72 hours.    Patient Name: Robert Menendez  Caller Name: same   Name of Facility:   Callback Number: [phone]  Best Availability:   Fax Number:   Additional Info: Caller stated that she was to have an MRI or Cat Scan for her Eye. This is all the information provided. Writer could not assist any futher.   Did you confirm the message with the caller?: yes    Thank you,  Myrtle Romero    
I called pt to let her know I spoke with one of the RNs at the office   They want her records from all the other places she was seen, they are able to see the [facility] records.  They advised to hold off on imaging as [facility] did not recommend any and they will need to review prior.  Pt agreed and will have records forwarded to the clinic.    
Yvrose,    Last OV with you was 9/12/2018. Will you place order for eye imaging?    Thank you  
36.6

## 2025-04-29 NOTE — H&P PST ADULT - NSICDXPASTSURGICALHX_GEN_ALL_CORE_FT
PAST SURGICAL HISTORY:  History of coronary angiogram     History of loop recorder     Tracheal nodule

## 2025-04-30 ENCOUNTER — APPOINTMENT (OUTPATIENT)
Dept: TRANSPLANT | Facility: CLINIC | Age: 55
End: 2025-04-30

## 2025-05-01 LAB
ABORH: NORMAL
ALBUMIN SERPL ELPH-MCNC: 4.3 G/DL
ALP BLD-CCNC: 112 U/L
ALT SERPL-CCNC: 17 U/L
ANION GAP SERPL CALC-SCNC: 16 MMOL/L
APPEARANCE: CLEAR
APTT BLD: 39.9 SEC
AST SERPL-CCNC: 17 U/L
BACTERIA UR CULT: NORMAL
BILIRUB SERPL-MCNC: 0.3 MG/DL
BILIRUBIN URINE: NEGATIVE
BLOOD URINE: NEGATIVE
BUN SERPL-MCNC: 42 MG/DL
CALCIUM SERPL-MCNC: 9 MG/DL
CHLORIDE SERPL-SCNC: 104 MMOL/L
CMV IGG SERPL QL: >10 U/ML
CMV IGG SERPL-IMP: POSITIVE
CO2 SERPL-SCNC: 21 MMOL/L
COLOR: YELLOW
CREAT SERPL-MCNC: 4.12 MG/DL
EBV EA AB SER IA-ACNC: 5.2 U/ML
EBV EA AB TITR SER IF: POSITIVE
EBV EA IGG SER QL IA: 233 U/ML
EBV EA IGG SER-ACNC: NEGATIVE
EBV EA IGM SER IA-ACNC: NEGATIVE
EBV PATRN SPEC IB-IMP: NORMAL
EBV VCA IGG SER IA-ACNC: 262 U/ML
EBV VCA IGM SER QL IA: <10 U/ML
EGFRCR SERPLBLD CKD-EPI 2021: 17 ML/MIN/1.73M2
EPSTEIN-BARR VIRUS CAPSID ANTIGEN IGG: POSITIVE
GLUCOSE QUALITATIVE U: >=1000 MG/DL
GLUCOSE SERPL-MCNC: 137 MG/DL
HBV CORE IGM SER QL: NONREACTIVE
HBV E AB SER QL: NONREACTIVE
HBV E AG SER QL: NONREACTIVE
HBV SURFACE AB SERPL IA-ACNC: 3.5 MIU/ML
HCT VFR BLD CALC: 40.6 %
HCV RNA FLD QL NAA+PROBE: NORMAL
HCV RNA SPEC QL PROBE+SIG AMP: NOT DETECTED
HGB BLD-MCNC: 13.3 G/DL
INR PPP: 1.21 RATIO
KETONES URINE: NEGATIVE MG/DL
LEUKOCYTE ESTERASE URINE: NEGATIVE
MCHC RBC-ENTMCNC: 27.5 PG
MCHC RBC-ENTMCNC: 32.8 G/DL
MCV RBC AUTO: 83.9 FL
NITRITE URINE: NEGATIVE
PH URINE: 6
PLATELET # BLD AUTO: 356 K/UL
POTASSIUM SERPL-SCNC: 4.4 MMOL/L
PROT SERPL-MCNC: 7 G/DL
PROTEIN URINE: 300 MG/DL
PT BLD: 14.4 SEC
RBC # BLD: 4.84 M/UL
RBC # FLD: 14.7 %
SODIUM SERPL-SCNC: 140 MMOL/L
SPECIFIC GRAVITY URINE: 1.02
UROBILINOGEN URINE: 0.2 MG/DL
WBC # FLD AUTO: 12.92 K/UL

## 2025-05-03 PROBLEM — R20.2 PARESTHESIA OF SKIN: Chronic | Status: ACTIVE | Noted: 2025-04-29

## 2025-05-03 PROBLEM — Z86.73 PERSONAL HISTORY OF TRANSIENT ISCHEMIC ATTACK (TIA), AND CEREBRAL INFARCTION WITHOUT RESIDUAL DEFICITS: Chronic | Status: ACTIVE | Noted: 2025-04-29

## 2025-05-03 PROBLEM — G47.33 OBSTRUCTIVE SLEEP APNEA (ADULT) (PEDIATRIC): Chronic | Status: ACTIVE | Noted: 2025-04-29

## 2025-05-08 ENCOUNTER — NON-APPOINTMENT (OUTPATIENT)
Age: 55
End: 2025-05-08

## 2025-05-16 ENCOUNTER — NON-APPOINTMENT (OUTPATIENT)
Age: 55
End: 2025-05-16

## 2025-05-20 ENCOUNTER — APPOINTMENT (OUTPATIENT)
Dept: TRANSPLANT | Facility: HOSPITAL | Age: 55
End: 2025-05-20

## 2025-05-20 ENCOUNTER — INPATIENT (INPATIENT)
Facility: HOSPITAL | Age: 55
LOS: 4 days | Discharge: HOME HEALTH | DRG: 684 | End: 2025-05-25
Attending: TRANSPLANT SURGERY | Admitting: TRANSPLANT SURGERY
Payer: MEDICAID

## 2025-05-20 VITALS
HEART RATE: 96 BPM | WEIGHT: 223.11 LBS | RESPIRATION RATE: 15 BRPM | TEMPERATURE: 98 F | DIASTOLIC BLOOD PRESSURE: 98 MMHG | SYSTOLIC BLOOD PRESSURE: 148 MMHG | OXYGEN SATURATION: 96 % | HEIGHT: 67.99 IN

## 2025-05-20 DIAGNOSIS — J39.8 OTHER SPECIFIED DISEASES OF UPPER RESPIRATORY TRACT: Chronic | ICD-10-CM

## 2025-05-20 DIAGNOSIS — Z98.890 OTHER SPECIFIED POSTPROCEDURAL STATES: Chronic | ICD-10-CM

## 2025-05-20 LAB
ALBUMIN SERPL ELPH-MCNC: 3.7 G/DL — SIGNIFICANT CHANGE UP (ref 3.3–5)
ALBUMIN SERPL ELPH-MCNC: 4 G/DL — SIGNIFICANT CHANGE UP (ref 3.3–5)
ALP SERPL-CCNC: 112 U/L — SIGNIFICANT CHANGE UP (ref 40–120)
ALP SERPL-CCNC: 116 U/L — SIGNIFICANT CHANGE UP (ref 40–120)
ALT FLD-CCNC: 16 U/L — SIGNIFICANT CHANGE UP (ref 10–45)
ALT FLD-CCNC: 17 U/L — SIGNIFICANT CHANGE UP (ref 10–45)
ANION GAP SERPL CALC-SCNC: 14 MMOL/L — SIGNIFICANT CHANGE UP (ref 5–17)
ANION GAP SERPL CALC-SCNC: 19 MMOL/L — HIGH (ref 5–17)
APTT BLD: 32.3 SEC — SIGNIFICANT CHANGE UP (ref 26.1–36.8)
APTT BLD: 33.7 SEC — SIGNIFICANT CHANGE UP (ref 26.1–36.8)
AST SERPL-CCNC: 21 U/L — SIGNIFICANT CHANGE UP (ref 10–40)
AST SERPL-CCNC: 22 U/L — SIGNIFICANT CHANGE UP (ref 10–40)
BASOPHILS # BLD AUTO: 0.04 K/UL — SIGNIFICANT CHANGE UP (ref 0–0.2)
BASOPHILS # BLD AUTO: 0.21 K/UL — HIGH (ref 0–0.2)
BASOPHILS NFR BLD AUTO: 0.3 % — SIGNIFICANT CHANGE UP (ref 0–2)
BASOPHILS NFR BLD AUTO: 0.9 % — SIGNIFICANT CHANGE UP (ref 0–2)
BILIRUB SERPL-MCNC: 0.3 MG/DL — SIGNIFICANT CHANGE UP (ref 0.2–1.2)
BILIRUB SERPL-MCNC: 0.3 MG/DL — SIGNIFICANT CHANGE UP (ref 0.2–1.2)
BUN SERPL-MCNC: 37 MG/DL — HIGH (ref 7–23)
BUN SERPL-MCNC: 40 MG/DL — HIGH (ref 7–23)
CALCIUM SERPL-MCNC: 7.9 MG/DL — LOW (ref 8.4–10.5)
CALCIUM SERPL-MCNC: 9 MG/DL — SIGNIFICANT CHANGE UP (ref 8.4–10.5)
CHLORIDE SERPL-SCNC: 102 MMOL/L — SIGNIFICANT CHANGE UP (ref 96–108)
CHLORIDE SERPL-SCNC: 105 MMOL/L — SIGNIFICANT CHANGE UP (ref 96–108)
CO2 SERPL-SCNC: 19 MMOL/L — LOW (ref 22–31)
CO2 SERPL-SCNC: 19 MMOL/L — LOW (ref 22–31)
CREAT SERPL-MCNC: 3.51 MG/DL — HIGH (ref 0.5–1.3)
CREAT SERPL-MCNC: 4.18 MG/DL — HIGH (ref 0.5–1.3)
EGFR: 16 ML/MIN/1.73M2 — LOW
EGFR: 16 ML/MIN/1.73M2 — LOW
EGFR: 20 ML/MIN/1.73M2 — LOW
EGFR: 20 ML/MIN/1.73M2 — LOW
EOSINOPHIL # BLD AUTO: 0 K/UL — SIGNIFICANT CHANGE UP (ref 0–0.5)
EOSINOPHIL # BLD AUTO: 0.2 K/UL — SIGNIFICANT CHANGE UP (ref 0–0.5)
EOSINOPHIL NFR BLD AUTO: 0 % — SIGNIFICANT CHANGE UP (ref 0–6)
EOSINOPHIL NFR BLD AUTO: 1.6 % — SIGNIFICANT CHANGE UP (ref 0–6)
GAS PNL BLDA: SIGNIFICANT CHANGE UP
GAS PNL BLDV: SIGNIFICANT CHANGE UP
GLUCOSE BLDC GLUCOMTR-MCNC: 152 MG/DL — HIGH (ref 70–99)
GLUCOSE BLDC GLUCOMTR-MCNC: 212 MG/DL — HIGH (ref 70–99)
GLUCOSE BLDC GLUCOMTR-MCNC: 215 MG/DL — HIGH (ref 70–99)
GLUCOSE SERPL-MCNC: 166 MG/DL — HIGH (ref 70–99)
GLUCOSE SERPL-MCNC: 250 MG/DL — HIGH (ref 70–99)
HBV CORE AB SER-ACNC: SIGNIFICANT CHANGE UP
HBV SURFACE AB SER-ACNC: <3.3 MIU/ML — LOW
HBV SURFACE AG SER-ACNC: SIGNIFICANT CHANGE UP
HCT VFR BLD CALC: 39.9 % — SIGNIFICANT CHANGE UP (ref 39–50)
HCT VFR BLD CALC: 41.2 % — SIGNIFICANT CHANGE UP (ref 39–50)
HCV AB S/CO SERPL IA: 0.05 S/CO — SIGNIFICANT CHANGE UP
HCV AB SERPL-IMP: SIGNIFICANT CHANGE UP
HCV RNA SPEC NAA+PROBE-LOG IU: SIGNIFICANT CHANGE UP
HCV RNA SPEC NAA+PROBE-LOG IU: SIGNIFICANT CHANGE UP LOGIU/ML
HGB BLD-MCNC: 12.9 G/DL — LOW (ref 13–17)
HGB BLD-MCNC: 13.5 G/DL — SIGNIFICANT CHANGE UP (ref 13–17)
HIV 1+2 AB+HIV1 P24 AG SERPL QL IA: SIGNIFICANT CHANGE UP
IMM GRANULOCYTES NFR BLD AUTO: 0.4 % — SIGNIFICANT CHANGE UP (ref 0–0.9)
INR BLD: 1.08 RATIO — SIGNIFICANT CHANGE UP (ref 0.85–1.16)
INR BLD: 1.14 RATIO — SIGNIFICANT CHANGE UP (ref 0.85–1.16)
LYMPHOCYTES # BLD AUTO: 0.21 K/UL — LOW (ref 1–3.3)
LYMPHOCYTES # BLD AUTO: 0.9 % — LOW (ref 13–44)
LYMPHOCYTES # BLD AUTO: 1.5 K/UL — SIGNIFICANT CHANGE UP (ref 1–3.3)
LYMPHOCYTES # BLD AUTO: 12.3 % — LOW (ref 13–44)
MAGNESIUM SERPL-MCNC: 2.4 MG/DL — SIGNIFICANT CHANGE UP (ref 1.6–2.6)
MAGNESIUM SERPL-MCNC: 2.5 MG/DL — SIGNIFICANT CHANGE UP (ref 1.6–2.6)
MANUAL SMEAR VERIFICATION: SIGNIFICANT CHANGE UP
MCHC RBC-ENTMCNC: 27.8 PG — SIGNIFICANT CHANGE UP (ref 27–34)
MCHC RBC-ENTMCNC: 28 PG — SIGNIFICANT CHANGE UP (ref 27–34)
MCHC RBC-ENTMCNC: 32.3 G/DL — SIGNIFICANT CHANGE UP (ref 32–36)
MCHC RBC-ENTMCNC: 32.8 G/DL — SIGNIFICANT CHANGE UP (ref 32–36)
MCV RBC AUTO: 85.3 FL — SIGNIFICANT CHANGE UP (ref 80–100)
MCV RBC AUTO: 86 FL — SIGNIFICANT CHANGE UP (ref 80–100)
MONOCYTES # BLD AUTO: 0.21 K/UL — SIGNIFICANT CHANGE UP (ref 0–0.9)
MONOCYTES # BLD AUTO: 0.95 K/UL — HIGH (ref 0–0.9)
MONOCYTES NFR BLD AUTO: 0.9 % — LOW (ref 2–14)
MONOCYTES NFR BLD AUTO: 7.8 % — SIGNIFICANT CHANGE UP (ref 2–14)
NEUTROPHILS # BLD AUTO: 22.59 K/UL — HIGH (ref 1.8–7.4)
NEUTROPHILS # BLD AUTO: 9.43 K/UL — HIGH (ref 1.8–7.4)
NEUTROPHILS NFR BLD AUTO: 77.6 % — HIGH (ref 43–77)
NEUTROPHILS NFR BLD AUTO: 97.3 % — HIGH (ref 43–77)
NRBC BLD AUTO-RTO: 0 /100 WBCS — SIGNIFICANT CHANGE UP (ref 0–0)
PHOSPHATE SERPL-MCNC: 4 MG/DL — SIGNIFICANT CHANGE UP (ref 2.5–4.5)
PHOSPHATE SERPL-MCNC: 4.2 MG/DL — SIGNIFICANT CHANGE UP (ref 2.5–4.5)
PLAT MORPH BLD: NORMAL — SIGNIFICANT CHANGE UP
PLATELET # BLD AUTO: 352 K/UL — SIGNIFICANT CHANGE UP (ref 150–400)
PLATELET # BLD AUTO: 360 K/UL — SIGNIFICANT CHANGE UP (ref 150–400)
POTASSIUM BLDV-SCNC: 4.5 MMOL/L — SIGNIFICANT CHANGE UP (ref 3.5–5.1)
POTASSIUM SERPL-MCNC: 4.4 MMOL/L — SIGNIFICANT CHANGE UP (ref 3.5–5.3)
POTASSIUM SERPL-MCNC: 5.1 MMOL/L — SIGNIFICANT CHANGE UP (ref 3.5–5.3)
POTASSIUM SERPL-SCNC: 4.4 MMOL/L — SIGNIFICANT CHANGE UP (ref 3.5–5.3)
POTASSIUM SERPL-SCNC: 5.1 MMOL/L — SIGNIFICANT CHANGE UP (ref 3.5–5.3)
PROT SERPL-MCNC: 6.8 G/DL — SIGNIFICANT CHANGE UP (ref 6–8.3)
PROT SERPL-MCNC: 7.4 G/DL — SIGNIFICANT CHANGE UP (ref 6–8.3)
PROTHROM AB SERPL-ACNC: 12.3 SEC — SIGNIFICANT CHANGE UP (ref 9.9–13.4)
PROTHROM AB SERPL-ACNC: 13 SEC — SIGNIFICANT CHANGE UP (ref 9.9–13.4)
RBC # BLD: 4.64 M/UL — SIGNIFICANT CHANGE UP (ref 4.2–5.8)
RBC # BLD: 4.83 M/UL — SIGNIFICANT CHANGE UP (ref 4.2–5.8)
RBC # FLD: 14.2 % — SIGNIFICANT CHANGE UP (ref 10.3–14.5)
RBC # FLD: 14.3 % — SIGNIFICANT CHANGE UP (ref 10.3–14.5)
RBC BLD AUTO: SIGNIFICANT CHANGE UP
RH IG SCN BLD-IMP: POSITIVE — SIGNIFICANT CHANGE UP
SODIUM SERPL-SCNC: 138 MMOL/L — SIGNIFICANT CHANGE UP (ref 135–145)
SODIUM SERPL-SCNC: 140 MMOL/L — SIGNIFICANT CHANGE UP (ref 135–145)
WBC # BLD: 12.17 K/UL — HIGH (ref 3.8–10.5)
WBC # BLD: 23.22 K/UL — HIGH (ref 3.8–10.5)
WBC # FLD AUTO: 12.17 K/UL — HIGH (ref 3.8–10.5)
WBC # FLD AUTO: 23.22 K/UL — HIGH (ref 3.8–10.5)

## 2025-05-20 PROCEDURE — 71045 X-RAY EXAM CHEST 1 VIEW: CPT | Mod: 26

## 2025-05-20 PROCEDURE — 93010 ELECTROCARDIOGRAM REPORT: CPT

## 2025-05-20 PROCEDURE — 76998 US GUIDE INTRAOP: CPT | Mod: 26,GC

## 2025-05-20 PROCEDURE — 50325 PREP DONOR RENAL GRAFT: CPT | Mod: GC

## 2025-05-20 PROCEDURE — 50605 INSERT URETERAL SUPPORT: CPT | Mod: GC,RT

## 2025-05-20 PROCEDURE — 76776 US EXAM K TRANSPL W/DOPPLER: CPT | Mod: 26,RT

## 2025-05-20 PROCEDURE — 50360 RNL ALTRNSPLJ W/O RCP NFRCT: CPT | Mod: GC,22

## 2025-05-20 DEVICE — KIT A-LINE 1LUM 20G X 12CM SAFE KIT: Type: IMPLANTABLE DEVICE | Site: RIGHT | Status: FUNCTIONAL

## 2025-05-20 DEVICE — SURGICEL 2 X 14": Type: IMPLANTABLE DEVICE | Site: RIGHT | Status: FUNCTIONAL

## 2025-05-20 DEVICE — STENT URET DBL J CLSD 6FRX12CM: Type: IMPLANTABLE DEVICE | Site: RIGHT | Status: FUNCTIONAL

## 2025-05-20 RX ORDER — VALGANCICLOVIR 450 MG/1
450 TABLET, FILM COATED ORAL
Refills: 0 | Status: DISCONTINUED | OUTPATIENT
Start: 2025-05-21 | End: 2025-05-22

## 2025-05-20 RX ORDER — SULFAMETHOXAZOLE/TRIMETHOPRIM 800-160 MG
1 TABLET ORAL DAILY
Refills: 0 | Status: DISCONTINUED | OUTPATIENT
Start: 2025-05-21 | End: 2025-05-25

## 2025-05-20 RX ORDER — GLUCAGON 3 MG/1
1 POWDER NASAL ONCE
Refills: 0 | Status: DISCONTINUED | OUTPATIENT
Start: 2025-05-20 | End: 2025-05-25

## 2025-05-20 RX ORDER — HYDROMORPHONE/SOD CHLOR,ISO/PF 2 MG/10 ML
0.25 SYRINGE (ML) INJECTION
Refills: 0 | Status: DISCONTINUED | OUTPATIENT
Start: 2025-05-20 | End: 2025-05-21

## 2025-05-20 RX ORDER — BASILIXIMAB 20 MG/5ML
20 INJECTION, POWDER, FOR SOLUTION INTRAVENOUS ONCE
Refills: 0 | Status: DISCONTINUED | OUTPATIENT
Start: 2025-05-20 | End: 2025-05-20

## 2025-05-20 RX ORDER — CEFAZOLIN SODIUM IN 0.9 % NACL 3 G/100 ML
2000 INTRAVENOUS SOLUTION, PIGGYBACK (ML) INTRAVENOUS ONCE
Refills: 0 | Status: COMPLETED | OUTPATIENT
Start: 2025-05-20 | End: 2025-05-20

## 2025-05-20 RX ORDER — INSULIN LISPRO 100 U/ML
INJECTION, SOLUTION INTRAVENOUS; SUBCUTANEOUS AT BEDTIME
Refills: 0 | Status: DISCONTINUED | OUTPATIENT
Start: 2025-05-20 | End: 2025-05-25

## 2025-05-20 RX ORDER — METHYLPREDNISOLONE ACETATE 80 MG/ML
30 INJECTION, SUSPENSION INTRA-ARTICULAR; INTRALESIONAL; INTRAMUSCULAR; SOFT TISSUE
Refills: 0 | Status: COMPLETED | OUTPATIENT
Start: 2025-05-23 | End: 2025-05-23

## 2025-05-20 RX ORDER — DEXTROSE 50 % IN WATER 50 %
15 SYRINGE (ML) INTRAVENOUS ONCE
Refills: 0 | Status: DISCONTINUED | OUTPATIENT
Start: 2025-05-20 | End: 2025-05-25

## 2025-05-20 RX ORDER — FENTANYL CITRATE-0.9 % NACL/PF 100MCG/2ML
25 SYRINGE (ML) INTRAVENOUS
Refills: 0 | Status: DISCONTINUED | OUTPATIENT
Start: 2025-05-20 | End: 2025-05-21

## 2025-05-20 RX ORDER — TRAMADOL HYDROCHLORIDE 50 MG/1
50 TABLET, FILM COATED ORAL EVERY 8 HOURS
Refills: 0 | Status: DISCONTINUED | OUTPATIENT
Start: 2025-05-20 | End: 2025-05-25

## 2025-05-20 RX ORDER — SODIUM CHLORIDE 9 G/1000ML
1000 INJECTION, SOLUTION INTRAVENOUS
Refills: 0 | Status: DISCONTINUED | OUTPATIENT
Start: 2025-05-20 | End: 2025-05-25

## 2025-05-20 RX ORDER — METHYLPREDNISOLONE ACETATE 80 MG/ML
60 INJECTION, SUSPENSION INTRA-ARTICULAR; INTRALESIONAL; INTRAMUSCULAR; SOFT TISSUE
Refills: 0 | Status: COMPLETED | OUTPATIENT
Start: 2025-05-22 | End: 2025-05-22

## 2025-05-20 RX ORDER — METHYLPREDNISOLONE ACETATE 80 MG/ML
125 INJECTION, SUSPENSION INTRA-ARTICULAR; INTRALESIONAL; INTRAMUSCULAR; SOFT TISSUE
Refills: 0 | Status: COMPLETED | OUTPATIENT
Start: 2025-05-21 | End: 2025-05-21

## 2025-05-20 RX ORDER — SENNA 187 MG
2 TABLET ORAL AT BEDTIME
Refills: 0 | Status: DISCONTINUED | OUTPATIENT
Start: 2025-05-20 | End: 2025-05-25

## 2025-05-20 RX ORDER — MYCOPHENOLATE MOFETIL 500 MG/1
1000 TABLET, FILM COATED ORAL EVERY 12 HOURS
Refills: 0 | Status: DISCONTINUED | OUTPATIENT
Start: 2025-05-21 | End: 2025-05-23

## 2025-05-20 RX ORDER — DEXTROSE 50 % IN WATER 50 %
25 SYRINGE (ML) INTRAVENOUS ONCE
Refills: 0 | Status: DISCONTINUED | OUTPATIENT
Start: 2025-05-20 | End: 2025-05-25

## 2025-05-20 RX ORDER — BASILIXIMAB 20 MG/5ML
20 INJECTION, POWDER, FOR SOLUTION INTRAVENOUS
Refills: 0 | Status: COMPLETED | OUTPATIENT
Start: 2025-05-24 | End: 2025-05-24

## 2025-05-20 RX ORDER — DEXTROSE 50 % IN WATER 50 %
12.5 SYRINGE (ML) INTRAVENOUS ONCE
Refills: 0 | Status: DISCONTINUED | OUTPATIENT
Start: 2025-05-20 | End: 2025-05-25

## 2025-05-20 RX ORDER — SODIUM CHLORIDE 9 G/1000ML
1000 INJECTION, SOLUTION INTRAVENOUS
Refills: 0 | Status: DISCONTINUED | OUTPATIENT
Start: 2025-05-20 | End: 2025-05-21

## 2025-05-20 RX ORDER — SODIUM CHLORIDE 9 G/1000ML
1000 INJECTION, SOLUTION INTRAVENOUS
Refills: 0 | Status: DISCONTINUED | OUTPATIENT
Start: 2025-05-20 | End: 2025-05-22

## 2025-05-20 RX ORDER — ONDANSETRON HCL/PF 4 MG/2 ML
4 VIAL (ML) INJECTION ONCE
Refills: 0 | Status: DISCONTINUED | OUTPATIENT
Start: 2025-05-20 | End: 2025-05-21

## 2025-05-20 RX ORDER — POLYETHYLENE GLYCOL 3350 17 G/17G
17 POWDER, FOR SOLUTION ORAL DAILY
Refills: 0 | Status: DISCONTINUED | OUTPATIENT
Start: 2025-05-21 | End: 2025-05-25

## 2025-05-20 RX ORDER — ONDANSETRON HCL/PF 4 MG/2 ML
4 VIAL (ML) INJECTION EVERY 6 HOURS
Refills: 0 | Status: DISCONTINUED | OUTPATIENT
Start: 2025-05-20 | End: 2025-05-25

## 2025-05-20 RX ORDER — INSULIN LISPRO 100 U/ML
INJECTION, SOLUTION INTRAVENOUS; SUBCUTANEOUS
Refills: 0 | Status: DISCONTINUED | OUTPATIENT
Start: 2025-05-20 | End: 2025-05-25

## 2025-05-20 RX ORDER — METHYLPREDNISOLONE ACETATE 80 MG/ML
INJECTION, SUSPENSION INTRA-ARTICULAR; INTRALESIONAL; INTRAMUSCULAR; SOFT TISSUE
Refills: 0 | Status: COMPLETED | OUTPATIENT
Start: 2025-05-21 | End: 2025-05-23

## 2025-05-20 RX ORDER — TRAMADOL HYDROCHLORIDE 50 MG/1
25 TABLET, FILM COATED ORAL EVERY 6 HOURS
Refills: 0 | Status: DISCONTINUED | OUTPATIENT
Start: 2025-05-20 | End: 2025-05-25

## 2025-05-20 RX ADMIN — Medication 500000 UNIT(S): at 23:53

## 2025-05-20 RX ADMIN — Medication 1 APPLICATION(S): at 07:30

## 2025-05-20 RX ADMIN — SODIUM CHLORIDE 50 MILLILITER(S): 9 INJECTION, SOLUTION INTRAVENOUS at 19:36

## 2025-05-20 RX ADMIN — Medication 3 MILLILITER(S): at 07:30

## 2025-05-20 RX ADMIN — TRAMADOL HYDROCHLORIDE 25 MILLIGRAM(S): 50 TABLET, FILM COATED ORAL at 23:41

## 2025-05-20 RX ADMIN — Medication 0.25 MILLIGRAM(S): at 19:35

## 2025-05-20 RX ADMIN — SODIUM CHLORIDE 70 MILLILITER(S): 9 INJECTION, SOLUTION INTRAVENOUS at 19:35

## 2025-05-20 RX ADMIN — Medication 0.25 MILLIGRAM(S): at 19:50

## 2025-05-20 RX ADMIN — Medication 2 TABLET(S): at 21:56

## 2025-05-20 NOTE — PROGRESS NOTE ADULT - SUBJECTIVE AND OBJECTIVE BOX
Transplant Surgery - Post-Op Check  --------------------------------------------------------------   Tx Date:  5/20/2025 LDRT       POD#0    HPI: 55 year old male with PMH HTN, HLD, DMT2 (on Mounjaro and Farxiga, managed by PCP, reports most recent HGA1C around 6%, reports lantus and humalog insulin were d/c'ed and mounjaro was added), childhood asthma (no intubations/no longer uses inhaler), HOLLAND (noncompliant with CPAP), hypothyroidism, depression, CVA (around 2018/2019 - residual parasthesias s/p loop recorder placement and Eliquis BID) with recurrent TIAs (2020), paratracheal nodule s/p removal (benign, 2 years ago at [facility], no difficulty swallowing, unsure of surgeon's name) and CKD (eGFR 16, not currently on dialysis) presents for scheduled open living kidney transplant ot the right side, possible left with Dr. Webber.    Patient is now s/p LDRT 2a/1v/1u+stent under Simulect induction on 5/20/2025.       Interval Events:  - L. kidney to RLQ  - JPx1, gibson  - EBL 50cc, IVF 3.5L, 1L UOP intraop  - no acute complaints: difficult airway 2/2 previous paratracheal nodule removal surgeries    Immunosupression:  Induction: Simulect  Maintenance: FK/MMF/SST  Ongoing monitoring for signs of rejection     Potential Discharge date: TBD  Education:  Medications  Plan of care:  See Below    MEDICATIONS  (STANDING):  chlorhexidine 2% Cloths 1 Application(s) Topical daily  dextrose 5%. 1000 milliLiter(s) (100 mL/Hr) IV Continuous <Continuous>  dextrose 5%. 1000 milliLiter(s) (50 mL/Hr) IV Continuous <Continuous>  dextrose 50% Injectable 25 Gram(s) IV Push once  dextrose 50% Injectable 12.5 Gram(s) IV Push once  dextrose 50% Injectable 25 Gram(s) IV Push once  glucagon  Injectable 1 milliGRAM(s) IntraMuscular once  insulin lispro (ADMELOG) corrective regimen sliding scale   SubCutaneous three times a day before meals  insulin lispro (ADMELOG) corrective regimen sliding scale   SubCutaneous at bedtime  multiple electrolytes Injection Type 1 1000 milliLiter(s) (70 mL/Hr) IV Continuous <Continuous>  multiple electrolytes Injection Type 1 1000 milliLiter(s) (50 mL/Hr) IV Continuous <Continuous>  senna 2 Tablet(s) Oral at bedtime    MEDICATIONS  (PRN):  dextrose Oral Gel 15 Gram(s) Oral once PRN Blood Glucose LESS THAN 70 milliGRAM(s)/deciliter  fentaNYL    Injectable 25 MICROGram(s) IV Push every 5 minutes PRN Moderate Pain (4 - 6)  HYDROmorphone  Injectable 0.25 milliGRAM(s) IV Push every 10 minutes PRN Moderate Pain (4 - 6)  ondansetron Injectable 4 milliGRAM(s) IV Push every 6 hours PRN Nausea and/or Vomiting  ondansetron Injectable 4 milliGRAM(s) IV Push once PRN Nausea and/or Vomiting  traMADol 25 milliGRAM(s) Oral every 6 hours PRN Moderate Pain (4 - 6)  traMADol 50 milliGRAM(s) Oral every 8 hours PRN Severe Pain (7 - 10)      PAST MEDICAL & SURGICAL HISTORY:  Stroke      HTN (hypertension)      Diabetes      Hypothyroidism      Hyperlipidemia      Chronic kidney disease, unspecified      History of TIAs      Childhood asthma      HOLLAND (obstructive sleep apnea)      Numbness and tingling      Tracheal nodule      History of loop recorder      History of coronary angiogram          Vital Signs Last 24 Hrs  T(C): 36.8 (20 May 2025 18:55), Max: 36.8 (20 May 2025 07:42)  T(F): 98.2 (20 May 2025 18:55), Max: 98.2 (20 May 2025 07:42)  HR: 94 (20 May 2025 22:45) (78 - 96)  BP: 149/82 (20 May 2025 22:00) (133/78 - 149/82)  BP(mean): 102 (20 May 2025 22:00) (102 - 102)  RR: 16 (20 May 2025 22:45) (15 - 20)  SpO2: 94% (20 May 2025 22:45) (87% - 98%)    Parameters below as of 20 May 2025 22:45  Patient On (Oxygen Delivery Method): nasal cannula  O2 Flow (L/min): 2      I&O's Summary    20 May 2025 07:01  -  20 May 2025 22:56  --------------------------------------------------------  IN: 3390 mL / OUT: 3465 mL / NET: -75 mL                              12.9   23.22 )-----------( 360      ( 20 May 2025 19:38 )             39.9     05-20    140  |  102  |  37[H]  ----------------------------<  250[H]  4.4   |  19[L]  |  3.51[H]    Ca    7.9[L]      20 May 2025 19:38  Phos  4.0     05-20  Mg     2.5     05-20    TPro  6.8  /  Alb  3.7  /  TBili  0.3  /  DBili  x   /  AST  21  /  ALT  17  /  AlkPhos  112  05-20            Review of systems  Gen: No weight changes, fatigue, fevers/chills, weakness  Skin: No rashes  Head/Eyes/Ears/Mouth: No headache; Normal hearing; Normal vision w/o blurriness; No sinus pain/discomfort, sore throat  Respiratory: No dyspnea, cough, wheezing, hemoptysis  CV: No chest pain, PND, orthopnea  GI: Mild abdominal pain at surgical incision site; denies diarrhea, constipation, nausea, vomiting, melena, hematochezia  : No increased frequency, dysuria, hematuria, nocturia  MSK: No joint pain/swelling; no back pain; no edema  Neuro: No dizziness/lightheadedness, weakness, seizures, numbness, tingling  Heme: No easy bruising or bleeding  Endo: No heat/cold intolerance  Psych: No significant nervousness, anxiety, stress, depression  All other systems were reviewed and are negative, except as noted.      PHYSICAL EXAM:  Constitutional: Well developed / well nourished  Eyes: Anicteric, PERRLA  ENMT: nc/at  Neck: supple  Respiratory: CTA B/L  Cardiovascular: RRR  Gastrointestinal: Soft, non distended, mild tenderness at the incision site; incision c/d/i; QUAN x2 serosang  Genitourinary: Urinary catheter in place  Extremities: SCD's in place and working bilaterally  Vascular: +Dopplerable dp pulses bilaterally, feet warm b/l   Neurological: A&O x3  Skin: no rashes, ulcerations or lesions;  Musculoskeletal: Moving all extremities  Psychiatric: Responsive     Transplant Surgery - Post-Op Check  --------------------------------------------------------------   Tx Date:  5/20/2025 LDRT       POD#0    HPI: 55 year old male with PMH HTN, HLD, DMT2 (on Mounjaro and Farxiga, managed by PCP, reports most recent HGA1C around 6%, reports lantus and humalog insulin were d/c'ed and mounjaro was added), childhood asthma (no intubations/no longer uses inhaler), HOLLAND (noncompliant with CPAP), hypothyroidism, depression, CVA (around 2018/2019 - residual parasthesias s/p loop recorder placement and Eliquis BID) with recurrent TIAs (2020), paratracheal nodule s/p removal (benign, 2 years ago at [facility], no difficulty swallowing, unsure of surgeon's name) and CKD (eGFR 16, not currently on dialysis) presents for scheduled open living kidney transplant ot the right side, possible left with Dr. Webber.    Patient is now s/p LDRT 2a/1v/1u+stent under Simulect induction on 5/20/2025.       Interval Events:  - L. kidney to RLQ  - JPx1, gibson  - EBL 50cc, IVF 3.5L, 1L UOP intraop  - no acute complaints: difficult airway 2/2 previous paratracheal nodule removal surgeries  - good UO, Cr downtrending    Immunosupression:  Induction: Simulect  Maintenance: FK/MMF/SST  Ongoing monitoring for signs of rejection     Potential Discharge date: TBD  Education:  Medications  Plan of care:  See Below    MEDICATIONS  (STANDING):  chlorhexidine 2% Cloths 1 Application(s) Topical daily  dextrose 5%. 1000 milliLiter(s) (100 mL/Hr) IV Continuous <Continuous>  dextrose 5%. 1000 milliLiter(s) (50 mL/Hr) IV Continuous <Continuous>  dextrose 50% Injectable 25 Gram(s) IV Push once  dextrose 50% Injectable 12.5 Gram(s) IV Push once  dextrose 50% Injectable 25 Gram(s) IV Push once  glucagon  Injectable 1 milliGRAM(s) IntraMuscular once  insulin lispro (ADMELOG) corrective regimen sliding scale   SubCutaneous three times a day before meals  insulin lispro (ADMELOG) corrective regimen sliding scale   SubCutaneous at bedtime  multiple electrolytes Injection Type 1 1000 milliLiter(s) (70 mL/Hr) IV Continuous <Continuous>  multiple electrolytes Injection Type 1 1000 milliLiter(s) (50 mL/Hr) IV Continuous <Continuous>  senna 2 Tablet(s) Oral at bedtime    MEDICATIONS  (PRN):  dextrose Oral Gel 15 Gram(s) Oral once PRN Blood Glucose LESS THAN 70 milliGRAM(s)/deciliter  fentaNYL    Injectable 25 MICROGram(s) IV Push every 5 minutes PRN Moderate Pain (4 - 6)  HYDROmorphone  Injectable 0.25 milliGRAM(s) IV Push every 10 minutes PRN Moderate Pain (4 - 6)  ondansetron Injectable 4 milliGRAM(s) IV Push every 6 hours PRN Nausea and/or Vomiting  ondansetron Injectable 4 milliGRAM(s) IV Push once PRN Nausea and/or Vomiting  traMADol 25 milliGRAM(s) Oral every 6 hours PRN Moderate Pain (4 - 6)  traMADol 50 milliGRAM(s) Oral every 8 hours PRN Severe Pain (7 - 10)      PAST MEDICAL & SURGICAL HISTORY:  Stroke      HTN (hypertension)      Diabetes      Hypothyroidism      Hyperlipidemia      Chronic kidney disease, unspecified      History of TIAs      Childhood asthma      HOLLAND (obstructive sleep apnea)      Numbness and tingling      Tracheal nodule      History of loop recorder      History of coronary angiogram          Vital Signs Last 24 Hrs  T(C): 36.8 (20 May 2025 18:55), Max: 36.8 (20 May 2025 07:42)  T(F): 98.2 (20 May 2025 18:55), Max: 98.2 (20 May 2025 07:42)  HR: 94 (20 May 2025 22:45) (78 - 96)  BP: 149/82 (20 May 2025 22:00) (133/78 - 149/82)  BP(mean): 102 (20 May 2025 22:00) (102 - 102)  RR: 16 (20 May 2025 22:45) (15 - 20)  SpO2: 94% (20 May 2025 22:45) (87% - 98%)    Parameters below as of 20 May 2025 22:45  Patient On (Oxygen Delivery Method): nasal cannula  O2 Flow (L/min): 2      I&O's Summary    20 May 2025 07:01  -  20 May 2025 22:56  --------------------------------------------------------  IN: 3390 mL / OUT: 3465 mL / NET: -75 mL                              12.9   23.22 )-----------( 360      ( 20 May 2025 19:38 )             39.9     05-20    140  |  102  |  37[H]  ----------------------------<  250[H]  4.4   |  19[L]  |  3.51[H]    Ca    7.9[L]      20 May 2025 19:38  Phos  4.0     05-20  Mg     2.5     05-20    TPro  6.8  /  Alb  3.7  /  TBili  0.3  /  DBili  x   /  AST  21  /  ALT  17  /  AlkPhos  112  05-20            Review of systems  Gen: No weight changes, fatigue, fevers/chills, weakness  Skin: No rashes  Head/Eyes/Ears/Mouth: No headache; Normal hearing; Normal vision w/o blurriness; No sinus pain/discomfort, sore throat  Respiratory: No dyspnea, cough, wheezing, hemoptysis  CV: No chest pain, PND, orthopnea  GI: Mild abdominal pain at surgical incision site; denies diarrhea, constipation, nausea, vomiting, melena, hematochezia  : No increased frequency, dysuria, hematuria, nocturia  MSK: No joint pain/swelling; no back pain; no edema  Neuro: No dizziness/lightheadedness, weakness, seizures, numbness, tingling  Heme: No easy bruising or bleeding  Endo: No heat/cold intolerance  Psych: No significant nervousness, anxiety, stress, depression  All other systems were reviewed and are negative, except as noted.      PHYSICAL EXAM:  Constitutional: Well developed / well nourished  Eyes: Anicteric, PERRLA  ENMT: nc/at  Neck: supple  Respiratory: CTA B/L  Cardiovascular: RRR  Gastrointestinal: Soft, non distended, mild tenderness at the incision site; incision c/d/i; QUAN x2 serosang  Genitourinary: Urinary catheter in place  Extremities: SCD's in place and working bilaterally  Vascular: +Dopplerable dp pulses bilaterally, feet warm b/l   Neurological: A&O x3  Skin: no rashes, ulcerations or lesions;  Musculoskeletal: Moving all extremities  Psychiatric: Responsive

## 2025-05-20 NOTE — PROGRESS NOTE ADULT - ASSESSMENT
55 year old male with PMH HTN, HLD, DMT2 (on Mounjaro and Farxiga, managed by PCP, reports most recent HGA1C around 6%, reports lantus and humalog insulin were d/c'ed and mounjaro was added), childhood asthma (no intubations/no longer uses inhaler), HOLLAND (noncompliant with CPAP), hypothyroidism, depression, CVA (around 2018/2019 - residual parasthesias s/p loop recorder placement and Eliquis BID) with recurrent TIAs (2020), paratracheal nodule s/p removal (benign, 2 years ago at [facility], no difficulty swallowing, unsure of surgeon's name) and CKD (eGFR 16, not currently on dialysis) presents for scheduled open living kidney transplant ot the right side, possible left with Dr. Webber.    Patient is now s/p LDRT 2a/1v/1u+stent under Simulect induction on 5/20/2025.     [] POD#0 LDRT  - trend labs, vital signs  - Strict I's&O's, JPx1, gibson, IVF maintenance & replacements  - Renal Doppler: patent, homogenous flow, no collection, no RONAN.   - pain control  - bowel regimen  - ADAT  - SCDs/IS/ PT  - List to floor pending PACU course    [] IMMUNO  - Simulect induction (next dose due POD#4)  - Env TBD, MMF 1g BID, SST    [] DVT PPX  - SCDs  - SQH to be discussed when to begin daily

## 2025-05-20 NOTE — PATIENT PROFILE ADULT - OVER THE PAST TWO WEEKS HAVE YOU FELT DOWN, DEPRESSED OR HOPELESS?
Ortho Daily Progress Note    Subjective     Interval History: Patient seen and examined at bedside. Patient with request for better pain control postop; later improved. Patient tolerated diet and urinated appropriately. Denies nausea, vomiting, chills, SOB or chest pain. Patient denies new onset numbness/parasthesia/focal weakness. Patient understands plans. Questions answered.       Objective     Vital signs in last 24 hours:  Temp:  [36.3 °C (97.4 °F)-37.7 °C (99.9 °F)] 37.5 °C (99.5 °F)  Heart Rate:  [65-88] 82  Resp:  [10-16] 16  BP: (109-144)/(55-74) 111/55      Intake/Output Summary (Last 24 hours) at 9/17/2020 1901  Last data filed at 9/17/2020 1558  Gross per 24 hour   Intake 800 ml   Output 100 ml   Net 700 ml     Intake/Output this shift:  No intake/output data recorded.    Labs:            Microbiology Results     ** No results found for the last 720 hours. **            Imaging:  XR right shoulder: well aligned total shoulder prosthesis; postop soft tissue changes present      Physical Exam:  Gen: NAD, Cooperative  HEENT: No masses  CV: BCR, toes warm and well perfused  ABD: Nontender, nondistended  Neuro: Alert and oriented  Psych: yasmany mood/affect  Respiratory: no Respiratory distress  MSK:    LUE  No obvious deformity. Sling in place. Dressing CDI.  Appropriate TTP  No TTP bony prominences elsewhere. No palpable crepitus.  Appropriate pain with shoulder ROM  No pain with ROM elbow/wrist/fingers  Motor intact axillary/msk/median/radial/ulnar/AIN/PIN  SILT axillary/median/radial/ulnar n  Compartments soft and compressible  Hand WWP, radial pulse 2+    A/P:   56F s/p right TSA on 9/17 by Dr. Ornelas    Ancef 24 hours  WBAT RLE  PT/OT  OOB  DC PLANNING- Rehab vs SNF  DVT: Eliquis (held overnight to start this AM)  Pain control    Allen Lorenz DO PGY2   no

## 2025-05-20 NOTE — BRIEF OPERATIVE NOTE - OPERATION/FINDINGS
Live donor L renal transplant to R pelvis   Pre-operative induction with thymo and methylpred   Backtable preparation of L kidney revealing 2 arteries (1 upper pole), 1 vein and 1 ureter   Leal RLQ oblique incision   EO incised, peritoneum swept medially, iliac vessels palpated and dissected   End to side arterial (external iliac) and venous anastomosis with 5-0 prolenes, end to end upper pole renal artery and inferior epigastric artery with 7-0 prolenes   Lasix 80 and Mannitol 12.5 given, urine made on table   End to side ureterovesicular anastomosis with 5-0 PDS, 6Fr double J stent   19Fr Doroteo drain in perinephric space   Hemostasis at end of procedure   Abdominal wall closed in layers with 1-0 PDS  Live donor L renal transplant to R pelvis   Pre-operative induction with Simulect and methylpred   Backtable preparation of L kidney revealing 2 arteries (1 upper pole), 1 vein and 1 ureter   Leal RLQ oblique incision   EO incised, peritoneum swept medially, iliac vessels palpated and dissected   End to side arterial (external iliac) and venous anastomosis with 5-0 prolenes, end to end upper pole renal artery and inferior epigastric artery with 7-0 prolenes   Lasix 80 and Mannitol 12.5 given, urine made on table   End to side ureterovesicular anastomosis with 5-0 PDS, 6Fr double J stent   19Fr Doroteo drain in perinephric space   Hemostasis at end of procedure   Abdominal wall closed in layers with 1-0 PDS

## 2025-05-20 NOTE — PATIENT PROFILE ADULT - HOME ACCESSIBILITY CONCERNS
Analgesic ordered for pain control during US. , Smokey, is at bedside without need at this time. Pt placed on 2L O2 NC as she desats when resting r/t multiple pain medications.    none

## 2025-05-20 NOTE — PRE-ANESTHESIA EVALUATION ADULT - LAST CARDIAC ANGIOGRAM/IMAGING
possibly a few years ago at [facility], [place], unsure of indication, no cardiac stents placed

## 2025-05-21 LAB
A1C WITH ESTIMATED AVERAGE GLUCOSE RESULT: 6.9 % — HIGH (ref 4–5.6)
ADD ON TEST-SPECIMEN IN LAB: SIGNIFICANT CHANGE UP
ALBUMIN SERPL ELPH-MCNC: 3.5 G/DL — SIGNIFICANT CHANGE UP (ref 3.3–5)
ALBUMIN SERPL ELPH-MCNC: 3.6 G/DL — SIGNIFICANT CHANGE UP (ref 3.3–5)
ALP SERPL-CCNC: 100 U/L — SIGNIFICANT CHANGE UP (ref 40–120)
ALP SERPL-CCNC: 105 U/L — SIGNIFICANT CHANGE UP (ref 40–120)
ALT FLD-CCNC: 16 U/L — SIGNIFICANT CHANGE UP (ref 10–45)
ALT FLD-CCNC: 17 U/L — SIGNIFICANT CHANGE UP (ref 10–45)
ANION GAP SERPL CALC-SCNC: 19 MMOL/L — HIGH (ref 5–17)
ANION GAP SERPL CALC-SCNC: 20 MMOL/L — HIGH (ref 5–17)
AST SERPL-CCNC: 18 U/L — SIGNIFICANT CHANGE UP (ref 10–40)
AST SERPL-CCNC: 22 U/L — SIGNIFICANT CHANGE UP (ref 10–40)
BASOPHILS # BLD AUTO: 0.04 K/UL — SIGNIFICANT CHANGE UP (ref 0–0.2)
BASOPHILS # BLD AUTO: 0.05 K/UL — SIGNIFICANT CHANGE UP (ref 0–0.2)
BASOPHILS NFR BLD AUTO: 0.2 % — SIGNIFICANT CHANGE UP (ref 0–2)
BASOPHILS NFR BLD AUTO: 0.3 % — SIGNIFICANT CHANGE UP (ref 0–2)
BILIRUB SERPL-MCNC: 0.3 MG/DL — SIGNIFICANT CHANGE UP (ref 0.2–1.2)
BILIRUB SERPL-MCNC: 0.4 MG/DL — SIGNIFICANT CHANGE UP (ref 0.2–1.2)
BUN SERPL-MCNC: 26 MG/DL — HIGH (ref 7–23)
BUN SERPL-MCNC: 30 MG/DL — HIGH (ref 7–23)
CALCIUM SERPL-MCNC: 7.1 MG/DL — LOW (ref 8.4–10.5)
CALCIUM SERPL-MCNC: 7.4 MG/DL — LOW (ref 8.4–10.5)
CHLORIDE SERPL-SCNC: 101 MMOL/L — SIGNIFICANT CHANGE UP (ref 96–108)
CHLORIDE SERPL-SCNC: 103 MMOL/L — SIGNIFICANT CHANGE UP (ref 96–108)
CO2 SERPL-SCNC: 20 MMOL/L — LOW (ref 22–31)
CO2 SERPL-SCNC: 20 MMOL/L — LOW (ref 22–31)
CREAT SERPL-MCNC: 1.99 MG/DL — HIGH (ref 0.5–1.3)
CREAT SERPL-MCNC: 2.35 MG/DL — HIGH (ref 0.5–1.3)
EGFR: 32 ML/MIN/1.73M2 — LOW
EGFR: 32 ML/MIN/1.73M2 — LOW
EGFR: 39 ML/MIN/1.73M2 — LOW
EGFR: 39 ML/MIN/1.73M2 — LOW
EOSINOPHIL # BLD AUTO: 0 K/UL — SIGNIFICANT CHANGE UP (ref 0–0.5)
EOSINOPHIL # BLD AUTO: 0 K/UL — SIGNIFICANT CHANGE UP (ref 0–0.5)
EOSINOPHIL NFR BLD AUTO: 0 % — SIGNIFICANT CHANGE UP (ref 0–6)
EOSINOPHIL NFR BLD AUTO: 0 % — SIGNIFICANT CHANGE UP (ref 0–6)
ESTIMATED AVERAGE GLUCOSE: 151 MG/DL — HIGH (ref 68–114)
GLUCOSE BLDC GLUCOMTR-MCNC: 147 MG/DL — HIGH (ref 70–99)
GLUCOSE BLDC GLUCOMTR-MCNC: 197 MG/DL — HIGH (ref 70–99)
GLUCOSE BLDC GLUCOMTR-MCNC: 208 MG/DL — HIGH (ref 70–99)
GLUCOSE BLDC GLUCOMTR-MCNC: 245 MG/DL — HIGH (ref 70–99)
GLUCOSE SERPL-MCNC: 136 MG/DL — HIGH (ref 70–99)
GLUCOSE SERPL-MCNC: 185 MG/DL — HIGH (ref 70–99)
HCT VFR BLD CALC: 38.4 % — LOW (ref 39–50)
HCT VFR BLD CALC: 39.3 % — SIGNIFICANT CHANGE UP (ref 39–50)
HGB BLD-MCNC: 12.7 G/DL — LOW (ref 13–17)
HGB BLD-MCNC: 13.2 G/DL — SIGNIFICANT CHANGE UP (ref 13–17)
IMM GRANULOCYTES NFR BLD AUTO: 0.4 % — SIGNIFICANT CHANGE UP (ref 0–0.9)
IMM GRANULOCYTES NFR BLD AUTO: 0.5 % — SIGNIFICANT CHANGE UP (ref 0–0.9)
LYMPHOCYTES # BLD AUTO: 0.75 K/UL — LOW (ref 1–3.3)
LYMPHOCYTES # BLD AUTO: 1.24 K/UL — SIGNIFICANT CHANGE UP (ref 1–3.3)
LYMPHOCYTES # BLD AUTO: 3.7 % — LOW (ref 13–44)
LYMPHOCYTES # BLD AUTO: 6.6 % — LOW (ref 13–44)
MAGNESIUM SERPL-MCNC: 2.6 MG/DL — SIGNIFICANT CHANGE UP (ref 1.6–2.6)
MAGNESIUM SERPL-MCNC: 2.7 MG/DL — HIGH (ref 1.6–2.6)
MCHC RBC-ENTMCNC: 28 PG — SIGNIFICANT CHANGE UP (ref 27–34)
MCHC RBC-ENTMCNC: 28.8 PG — SIGNIFICANT CHANGE UP (ref 27–34)
MCHC RBC-ENTMCNC: 33.1 G/DL — SIGNIFICANT CHANGE UP (ref 32–36)
MCHC RBC-ENTMCNC: 33.6 G/DL — SIGNIFICANT CHANGE UP (ref 32–36)
MCV RBC AUTO: 84.8 FL — SIGNIFICANT CHANGE UP (ref 80–100)
MCV RBC AUTO: 85.6 FL — SIGNIFICANT CHANGE UP (ref 80–100)
MONOCYTES # BLD AUTO: 0.98 K/UL — HIGH (ref 0–0.9)
MONOCYTES # BLD AUTO: 2.02 K/UL — HIGH (ref 0–0.9)
MONOCYTES NFR BLD AUTO: 10.7 % — SIGNIFICANT CHANGE UP (ref 2–14)
MONOCYTES NFR BLD AUTO: 4.9 % — SIGNIFICANT CHANGE UP (ref 2–14)
NEUTROPHILS # BLD AUTO: 15.43 K/UL — HIGH (ref 1.8–7.4)
NEUTROPHILS # BLD AUTO: 18.33 K/UL — HIGH (ref 1.8–7.4)
NEUTROPHILS NFR BLD AUTO: 82 % — HIGH (ref 43–77)
NEUTROPHILS NFR BLD AUTO: 90.7 % — HIGH (ref 43–77)
NRBC BLD AUTO-RTO: 0 /100 WBCS — SIGNIFICANT CHANGE UP (ref 0–0)
NRBC BLD AUTO-RTO: 0 /100 WBCS — SIGNIFICANT CHANGE UP (ref 0–0)
PHOSPHATE SERPL-MCNC: 3.4 MG/DL — SIGNIFICANT CHANGE UP (ref 2.5–4.5)
PHOSPHATE SERPL-MCNC: 3.8 MG/DL — SIGNIFICANT CHANGE UP (ref 2.5–4.5)
PLATELET # BLD AUTO: 350 K/UL — SIGNIFICANT CHANGE UP (ref 150–400)
PLATELET # BLD AUTO: 361 K/UL — SIGNIFICANT CHANGE UP (ref 150–400)
POTASSIUM SERPL-MCNC: 3.8 MMOL/L — SIGNIFICANT CHANGE UP (ref 3.5–5.3)
POTASSIUM SERPL-MCNC: 4 MMOL/L — SIGNIFICANT CHANGE UP (ref 3.5–5.3)
POTASSIUM SERPL-SCNC: 3.8 MMOL/L — SIGNIFICANT CHANGE UP (ref 3.5–5.3)
POTASSIUM SERPL-SCNC: 4 MMOL/L — SIGNIFICANT CHANGE UP (ref 3.5–5.3)
PROT SERPL-MCNC: 6.4 G/DL — SIGNIFICANT CHANGE UP (ref 6–8.3)
PROT SERPL-MCNC: 6.8 G/DL — SIGNIFICANT CHANGE UP (ref 6–8.3)
RBC # BLD: 4.53 M/UL — SIGNIFICANT CHANGE UP (ref 4.2–5.8)
RBC # BLD: 4.59 M/UL — SIGNIFICANT CHANGE UP (ref 4.2–5.8)
RBC # FLD: 14.2 % — SIGNIFICANT CHANGE UP (ref 10.3–14.5)
RBC # FLD: 14.6 % — HIGH (ref 10.3–14.5)
SODIUM SERPL-SCNC: 141 MMOL/L — SIGNIFICANT CHANGE UP (ref 135–145)
SODIUM SERPL-SCNC: 142 MMOL/L — SIGNIFICANT CHANGE UP (ref 135–145)
WBC # BLD: 18.82 K/UL — HIGH (ref 3.8–10.5)
WBC # BLD: 20.2 K/UL — HIGH (ref 3.8–10.5)
WBC # FLD AUTO: 18.82 K/UL — HIGH (ref 3.8–10.5)
WBC # FLD AUTO: 20.2 K/UL — HIGH (ref 3.8–10.5)

## 2025-05-21 PROCEDURE — 99222 1ST HOSP IP/OBS MODERATE 55: CPT | Mod: GC

## 2025-05-21 RX ORDER — ATORVASTATIN CALCIUM 80 MG/1
80 TABLET, FILM COATED ORAL AT BEDTIME
Refills: 0 | Status: DISCONTINUED | OUTPATIENT
Start: 2025-05-21 | End: 2025-05-25

## 2025-05-21 RX ORDER — DEXTROMETHORPHAN HBR, GUAIFENESIN 20; 200 MG/10ML; MG/10ML
10 SOLUTION ORAL EVERY 6 HOURS
Refills: 0 | Status: DISCONTINUED | OUTPATIENT
Start: 2025-05-21 | End: 2025-05-25

## 2025-05-21 RX ORDER — LEVOTHYROXINE SODIUM 300 MCG
50 TABLET ORAL DAILY
Refills: 0 | Status: DISCONTINUED | OUTPATIENT
Start: 2025-05-21 | End: 2025-05-25

## 2025-05-21 RX ORDER — SULFAMETHOXAZOLE AND TRIMETHOPRIM 400; 80 MG/1; MG/1
400-80 TABLET ORAL
Qty: 30 | Refills: 11 | Status: ACTIVE | COMMUNITY
Start: 2025-05-21 | End: 1900-01-01

## 2025-05-21 RX ORDER — EZETIMIBE 10 MG/1
10 TABLET ORAL DAILY
Refills: 0 | Status: DISCONTINUED | OUTPATIENT
Start: 2025-05-21 | End: 2025-05-25

## 2025-05-21 RX ORDER — ASPIRIN 325 MG
81 TABLET ORAL DAILY
Refills: 0 | Status: DISCONTINUED | OUTPATIENT
Start: 2025-05-21 | End: 2025-05-25

## 2025-05-21 RX ORDER — VALGANCICLOVIR HYDROCHLORIDE 450 MG/1
450 TABLET ORAL
Qty: 30 | Refills: 5 | Status: ACTIVE | COMMUNITY
Start: 2025-05-21 | End: 1900-01-01

## 2025-05-21 RX ORDER — ASPIRIN 81 MG/1
81 TABLET, DELAYED RELEASE ORAL DAILY
Qty: 30 | Refills: 3 | Status: ACTIVE | COMMUNITY
Start: 2025-05-21 | End: 1900-01-01

## 2025-05-21 RX ORDER — ATORVASTATIN CALCIUM 80 MG/1
80 TABLET, FILM COATED ORAL
Qty: 30 | Refills: 0 | Status: ACTIVE | COMMUNITY
Start: 2025-05-21 | End: 1900-01-01

## 2025-05-21 RX ORDER — BUPROPION HYDROBROMIDE 522 MG/1
450 TABLET, EXTENDED RELEASE ORAL DAILY
Refills: 0 | Status: DISCONTINUED | OUTPATIENT
Start: 2025-05-21 | End: 2025-05-25

## 2025-05-21 RX ORDER — TACROLIMUS 0.5 MG/1
5 CAPSULE ORAL
Refills: 0 | Status: DISCONTINUED | OUTPATIENT
Start: 2025-05-22 | End: 2025-05-24

## 2025-05-21 RX ORDER — FAMOTIDINE 20 MG/1
20 TABLET, FILM COATED ORAL
Qty: 30 | Refills: 11 | Status: ACTIVE | COMMUNITY
Start: 2025-05-21 | End: 1900-01-01

## 2025-05-21 RX ORDER — NIFEDIPINE 30 MG
30 TABLET, EXTENDED RELEASE 24 HR ORAL ONCE
Refills: 0 | Status: COMPLETED | OUTPATIENT
Start: 2025-05-21 | End: 2025-05-21

## 2025-05-21 RX ORDER — SERTRALINE 100 MG/1
50 TABLET, FILM COATED ORAL DAILY
Refills: 0 | Status: DISCONTINUED | OUTPATIENT
Start: 2025-05-21 | End: 2025-05-25

## 2025-05-21 RX ORDER — APIXABAN 2.5 MG/1
2.5 TABLET, FILM COATED ORAL
Qty: 60 | Refills: 0 | Status: ACTIVE | COMMUNITY
Start: 2025-05-21 | End: 1900-01-01

## 2025-05-21 RX ORDER — PEN NEEDLE, DIABETIC 32GX 5/32"
32G X 4 MM NEEDLE, DISPOSABLE MISCELLANEOUS
Qty: 1 | Refills: 1 | Status: ACTIVE | COMMUNITY
Start: 2025-05-21 | End: 1900-01-01

## 2025-05-21 RX ORDER — SODIUM ZIRCONIUM CYCLOSILICATE 10 G/10G
10 POWDER, FOR SUSPENSION ORAL
Qty: 1 | Refills: 1 | Status: ACTIVE | COMMUNITY
Start: 2025-05-21 | End: 1900-01-01

## 2025-05-21 RX ORDER — TACROLIMUS 0.5 MG/1
5 CAPSULE ORAL ONCE
Refills: 0 | Status: COMPLETED | OUTPATIENT
Start: 2025-05-21 | End: 2025-05-21

## 2025-05-21 RX ORDER — SENNOSIDES 8.6 MG/1
8.6 CAPSULE, GELATIN COATED ORAL
Qty: 30 | Refills: 1 | Status: ACTIVE | COMMUNITY
Start: 2025-05-21 | End: 1900-01-01

## 2025-05-21 RX ORDER — EZETIMIBE 10 MG/1
10 TABLET ORAL
Qty: 30 | Refills: 5 | Status: ACTIVE | COMMUNITY
Start: 2025-05-21 | End: 1900-01-01

## 2025-05-21 RX ADMIN — TRAMADOL HYDROCHLORIDE 25 MILLIGRAM(S): 50 TABLET, FILM COATED ORAL at 07:59

## 2025-05-21 RX ADMIN — Medication 1 APPLICATION(S): at 12:05

## 2025-05-21 RX ADMIN — Medication 500000 UNIT(S): at 23:27

## 2025-05-21 RX ADMIN — Medication 2 TABLET(S): at 21:40

## 2025-05-21 RX ADMIN — TRAMADOL HYDROCHLORIDE 50 MILLIGRAM(S): 50 TABLET, FILM COATED ORAL at 04:26

## 2025-05-21 RX ADMIN — Medication 500000 UNIT(S): at 12:03

## 2025-05-21 RX ADMIN — TACROLIMUS 5 MILLIGRAM(S): 0.5 CAPSULE ORAL at 10:18

## 2025-05-21 RX ADMIN — Medication 500000 UNIT(S): at 17:18

## 2025-05-21 RX ADMIN — TRAMADOL HYDROCHLORIDE 50 MILLIGRAM(S): 50 TABLET, FILM COATED ORAL at 14:30

## 2025-05-21 RX ADMIN — ATORVASTATIN CALCIUM 80 MILLIGRAM(S): 80 TABLET, FILM COATED ORAL at 21:40

## 2025-05-21 RX ADMIN — SODIUM CHLORIDE 70 MILLILITER(S): 9 INJECTION, SOLUTION INTRAVENOUS at 10:19

## 2025-05-21 RX ADMIN — SODIUM CHLORIDE 150 MILLILITER(S): 9 INJECTION, SOLUTION INTRAVENOUS at 17:14

## 2025-05-21 RX ADMIN — Medication 30 MILLIGRAM(S): at 23:27

## 2025-05-21 RX ADMIN — POLYETHYLENE GLYCOL 3350 17 GRAM(S): 17 POWDER, FOR SOLUTION ORAL at 12:03

## 2025-05-21 RX ADMIN — Medication 1 TABLET(S): at 12:03

## 2025-05-21 RX ADMIN — TRAMADOL HYDROCHLORIDE 50 MILLIGRAM(S): 50 TABLET, FILM COATED ORAL at 21:40

## 2025-05-21 RX ADMIN — SERTRALINE 50 MILLIGRAM(S): 100 TABLET, FILM COATED ORAL at 13:59

## 2025-05-21 RX ADMIN — MYCOPHENOLATE MOFETIL 1000 MILLIGRAM(S): 500 TABLET, FILM COATED ORAL at 19:34

## 2025-05-21 RX ADMIN — Medication 500000 UNIT(S): at 05:14

## 2025-05-21 RX ADMIN — VALGANCICLOVIR 450 MILLIGRAM(S): 450 TABLET, FILM COATED ORAL at 07:59

## 2025-05-21 RX ADMIN — TRAMADOL HYDROCHLORIDE 50 MILLIGRAM(S): 50 TABLET, FILM COATED ORAL at 13:31

## 2025-05-21 RX ADMIN — TRAMADOL HYDROCHLORIDE 50 MILLIGRAM(S): 50 TABLET, FILM COATED ORAL at 22:40

## 2025-05-21 RX ADMIN — METHYLPREDNISOLONE ACETATE 125 MILLIGRAM(S): 80 INJECTION, SUSPENSION INTRA-ARTICULAR; INTRALESIONAL; INTRAMUSCULAR; SOFT TISSUE at 10:18

## 2025-05-21 RX ADMIN — EZETIMIBE 10 MILLIGRAM(S): 10 TABLET ORAL at 13:59

## 2025-05-21 RX ADMIN — Medication 20 MILLIGRAM(S): at 12:04

## 2025-05-21 RX ADMIN — MYCOPHENOLATE MOFETIL 1000 MILLIGRAM(S): 500 TABLET, FILM COATED ORAL at 08:01

## 2025-05-21 RX ADMIN — TRAMADOL HYDROCHLORIDE 25 MILLIGRAM(S): 50 TABLET, FILM COATED ORAL at 00:25

## 2025-05-21 RX ADMIN — Medication 50 MICROGRAM(S): at 13:59

## 2025-05-21 RX ADMIN — METHYLPREDNISOLONE ACETATE 125 MILLIGRAM(S): 80 INJECTION, SUSPENSION INTRA-ARTICULAR; INTRALESIONAL; INTRAMUSCULAR; SOFT TISSUE at 19:34

## 2025-05-21 RX ADMIN — TRAMADOL HYDROCHLORIDE 50 MILLIGRAM(S): 50 TABLET, FILM COATED ORAL at 05:00

## 2025-05-21 RX ADMIN — TRAMADOL HYDROCHLORIDE 25 MILLIGRAM(S): 50 TABLET, FILM COATED ORAL at 09:00

## 2025-05-21 RX ADMIN — Medication 81 MILLIGRAM(S): at 12:04

## 2025-05-21 RX ADMIN — INSULIN LISPRO 1: 100 INJECTION, SOLUTION INTRAVENOUS; SUBCUTANEOUS at 12:03

## 2025-05-21 RX ADMIN — INSULIN LISPRO 2: 100 INJECTION, SOLUTION INTRAVENOUS; SUBCUTANEOUS at 17:23

## 2025-05-21 RX ADMIN — DEXTROMETHORPHAN HBR, GUAIFENESIN 10 MILLILITER(S): 20; 200 SOLUTION ORAL at 23:27

## 2025-05-21 NOTE — PHYSICAL THERAPY INITIAL EVALUATION ADULT - GAIT DEVIATIONS NOTED, PT EVAL
decreased ata/increased time in double stance/decreased step length/decreased stride length/decreased weight-shifting ability

## 2025-05-21 NOTE — CONSULT NOTE ADULT - ASSESSMENT
55M with PMH of HTN, HLD, DMT2 (on Mounjaro and Farxiga, managed by PCP, previously was on lantus and humalog insulin were d/c'ed as mounjaro was added), childhood asthma (no intubations/no longer uses inhaler), HOLLAND (noncompliant with CPAP), hypothyroidism, depression, CVA (around 2018/2019 - residual parasthesias s/p loop recorder placement and Eliquis BID) with recurrent TIAs (2020), paratracheal nodule s/p removal (benign, 2 years ago at [facility], no difficulty swallowing, unsure of surgeon's name) and CKD (eGFR 16, not currently on dialysis) presents for and now s/p pre-emptive LDRT 2a/1v/1u+stent under Simulect induction on 5/20/2025.        S/p LDRT (5/20)  -Renal fx improving with significant UOP.  -Patient's Nephrologist is Dr. Kristen Brenner.    IS    Simulect induction  Tacro by level  MMF 1gm BID   SST (short taper with plan to take off steroids)  Ppx: Bactrim, Valcyte, Nystatin      DM  Insulin as needed      HTN  Resume home meds as needed      Jona Morillo  Nephrology Fellow  Feel free to contact me on TEAMS  After 5 pm and on weekends please contact the on-call Fellow.

## 2025-05-21 NOTE — PROGRESS NOTE ADULT - ASSESSMENT
55 year old male with PMH HTN, HLD, DMT2 (on Mounjaro and Farxiga, managed by PCP, reports most recent HGA1C around 6%, reports lantus and humalog insulin were d/c'ed and mounjaro was added), childhood asthma (no intubations/no longer uses inhaler), HOLLAND (noncompliant with CPAP), hypothyroidism, depression, CVA (around 2018/2019 - residual parasthesias s/p loop recorder placement and Eliquis BID) with recurrent TIAs (2020), paratracheal nodule s/p removal (benign, 2 years ago at [facility], no difficulty swallowing, unsure of surgeon's name) and CKD (eGFR 16, not currently on dialysis) presents for scheduled open living kidney transplant ot the right side, possible left with Dr. Webber.    Patient is now s/p LDRT 2a/1v/1u+stent under Simulect induction on 5/20/2025.     [] POD#1 LDRT  - trend labs, vital signs  - Strict I's&O's, JPx1, gibson,   - Renal Doppler: patent, homogenous flow, no collection, no RONAN.   - d/c replacement, inc standing to 150 cc/hr   - pain control  - bowel regimen  - reg diet  - SCDs/IS/ PT      [] IMMUNO  - Simulect induction (next dose due POD#4)  - Env by level , MMF 1g BID, SST    [] DVT PPX  - SCDs  - SQH to be discussed when to begin daily

## 2025-05-21 NOTE — PROGRESS NOTE ADULT - SUBJECTIVE AND OBJECTIVE BOX
REKHA PALACIO is a 55y Male s/p LDRT on 5/20/2025. PMH is significant for HTN, HLD, DMT2, childhood asthma (no intubations/no longer uses inhaler), HOLLAND (noncompliant with CPAP), hypothyroidism, depression, CVA (around 2018/2019 - residual parasthesias s/p loop recorder placement and Eliquis BID) with recurrent TIAs (2020), paratracheal nodule s/p removal (benign, 2 years ago at [facility], no difficulty swallowing, unsure of surgeon's name) and CKD    NKDA    CMV+/+  EBV-/+    Transplant Medications  Induction  -Basiliximab 20 mg POD 0 (given in OR) and POD 4  -Methylprednisolone taper (switch to PO prednisone on POD 4)            POD 0: 500 mg IV in OR            POD 1: 125 mg IV Q12H            POD 2: 60 mg IV Q12H            POD 3: 30 mg IV Q12H        Maintenance Immunosuppression  -Tacrolimus (Envarsus) 0.14 mg/kg daily (Adjust for goal trough: 8-10)  -Mycophenolate 1,000 mg PO Q12H    Anti-infection   -Bactrim SS tablet (frequency based on renal function)  -Valganciclovir (dose based on CMV serostatus and frequency based on renal function)    Surgical prophylaxis pre- and intra-operative dosing  -Cefazolin    Prophylaxis  -GI ppx: famotidine 20 mg daily  -Bowel ppx: senna  -DVT: sequential compression device  -Pain:            Mild: Acetaminophen 650 mg every 6 hours PRN           Moderate: Tramadol 25 mg every 4 hours PRN (adjust for renal function)           Severe: Tramadol 50 mg every 4 hours PRN (adjust for renal function)    Home Medications:  atorvastatin 80 mg oral tablet: 1 tab(s) orally once a day (at bedtime) (20 May 2025 09:11)  calcitriol 0.5 mcg oral capsule: 1 cap(s) orally once a day (20 May 2025 09:11)  Eliquis 2.5 mg oral tablet: 1 tab(s) orally 2 times a day (20 May 2025 08:44)  ezetimibe 10 mg oral tablet: 1 tab(s) orally once a day (20 May 2025 09:11)  famotidine 20 mg oral tablet: 1 tab(s) orally once a day (20 May 2025 09:11)  Farxiga 10 mg oral tablet: 1 tab(s) orally once a day (20 May 2025 08:44)  gabapentin 100 mg oral capsule: 1 cap(s) orally 2 times a day (20 May 2025 09:11)  labetalol 100 mg oral tablet: 1 tab(s) orally 3 times a day (20 May 2025 09:11)  levothyroxine 25 mcg (0.025 mg) oral tablet: 2 tab(s) orally once a day (20 May 2025 09:11)  Mounjaro SQ once weekly on Wednesdays:  (20 May 2025 08:44)  NIFEdipine (Eqv-Adalat CC) 90 mg oral tablet, extended release: 1 tab(s) orally once a day (20 May 2025 09:11)  buproprion 450 mg daily  sertraline 50 mg oral tablet: 1 tab(s) orally once a day (20 May 2025 09:11)  sodium bicarbonate: 650 milligram(s) orally 2 times a day (20 May 2025 09:11)  torsemide 20 mg oral tablet: 1 tab(s) orally 2 times a day (20 May 2025 09:11)    Outpatient medication reconciliation reviewed and will be re-started appropriately.  Plan discussed with multidisciplinary team.

## 2025-05-21 NOTE — PROVIDER CONTACT NOTE (MEDICATION) - ASSESSMENT
Pt A&Ox4. Pt stated that he received advice not to take steroids. Pt educated on use and importance of medication. Pt verbalized understanding and still requesting to speak with provider.

## 2025-05-21 NOTE — PROGRESS NOTE ADULT - SUBJECTIVE AND OBJECTIVE BOX
Transplant Surgery - Multidisciplinary Rounds  --------------------------------------------------------------   Tx Date:  5/20/2025 LDRT       POD#1    Patient seen and examined with Surgeon Dr. Carmichael, Nephrologist Dr. SOFY Kinney, ACP Romario/Angelica, Pharmacist Ethel, and unit RN. Disciplines not in attendance will be notified of the plan     HPI: 55 year old male with PMH HTN, HLD, DMT2 (on Mounjaro and Farxiga, managed by PCP, reports most recent HGA1C around 6%, reports lantus and humalog insulin were d/c'ed and mounjaro was added), childhood asthma (no intubations/no longer uses inhaler), HOLLAND (noncompliant with CPAP), hypothyroidism, depression, CVA (around 2018/2019 - residual parasthesias s/p loop recorder placement and Eliquis BID) with recurrent TIAs (2020), paratracheal nodule s/p removal (benign, 2 years ago at [facility], no difficulty swallowing, unsure of surgeon's name) and CKD (eGFR 16, not currently on dialysis) presents for scheduled open living kidney transplant ot the right side, possible left with Dr. Webber.    Patient is now s/p LDRT 2a/1v/1u+stent under Simulect induction on 5/20/2025.       Interval Events:  - s/p LDRT  - post op doppler patent  - Cr downtrending appropriately  - afebrile, vss  - making appropriate UOP     Immunosupression:  Induction: Simulect  Maintenance: FK by level/MMF/SST  Ongoing monitoring for signs of rejection     Potential Discharge date: TBD  Education:  Medications  Plan of care:  See Below        MEDICATIONS  (STANDING):  aspirin enteric coated 81 milliGRAM(s) Oral daily  atorvastatin 80 milliGRAM(s) Oral at bedtime  buPROPion XL (24-Hour) . 450 milliGRAM(s) Oral daily  chlorhexidine 2% Cloths 1 Application(s) Topical daily  dextrose 5%. 1000 milliLiter(s) (50 mL/Hr) IV Continuous <Continuous>  dextrose 5%. 1000 milliLiter(s) (100 mL/Hr) IV Continuous <Continuous>  dextrose 50% Injectable 25 Gram(s) IV Push once  dextrose 50% Injectable 12.5 Gram(s) IV Push once  dextrose 50% Injectable 25 Gram(s) IV Push once  ezetimibe 10 milliGRAM(s) Oral daily  famotidine    Tablet 20 milliGRAM(s) Oral daily  glucagon  Injectable 1 milliGRAM(s) IntraMuscular once  insulin lispro (ADMELOG) corrective regimen sliding scale   SubCutaneous three times a day before meals  insulin lispro (ADMELOG) corrective regimen sliding scale   SubCutaneous at bedtime  levothyroxine 50 MICROGram(s) Oral daily  methylPREDNISolone sodium succinate Injectable   IV Push   methylPREDNISolone sodium succinate Injectable 125 milliGRAM(s) IV Push two times a day  multiple electrolytes Injection Type 1 1000 milliLiter(s) (150 mL/Hr) IV Continuous <Continuous>  mycophenolate mofetil 1000 milliGRAM(s) Oral every 12 hours  nystatin    Suspension 471013 Unit(s) Swish and Swallow four times a day  polyethylene glycol 3350 17 Gram(s) Oral daily  senna 2 Tablet(s) Oral at bedtime  sertraline 50 milliGRAM(s) Oral daily  trimethoprim   80 mG/sulfamethoxazole 400 mG 1 Tablet(s) Oral daily  valGANciclovir 450 milliGRAM(s) Oral <User Schedule>    MEDICATIONS  (PRN):  dextrose Oral Gel 15 Gram(s) Oral once PRN Blood Glucose LESS THAN 70 milliGRAM(s)/deciliter  ondansetron Injectable 4 milliGRAM(s) IV Push every 6 hours PRN Nausea and/or Vomiting  traMADol 25 milliGRAM(s) Oral every 6 hours PRN Moderate Pain (4 - 6)  traMADol 50 milliGRAM(s) Oral every 8 hours PRN Severe Pain (7 - 10)      PAST MEDICAL & SURGICAL HISTORY:  Stroke      HTN (hypertension)      Diabetes      Hypothyroidism      Hyperlipidemia      Chronic kidney disease, unspecified      History of TIAs      Childhood asthma      HOLLAND (obstructive sleep apnea)      Numbness and tingling      Tracheal nodule      History of loop recorder      History of coronary angiogram          Vital Signs Last 24 Hrs  T(C): 36.7 (21 May 2025 12:47), Max: 37 (21 May 2025 02:27)  T(F): 98 (21 May 2025 12:47), Max: 98.6 (21 May 2025 02:27)  HR: 87 (21 May 2025 12:47) (78 - 96)  BP: 156/87 (21 May 2025 12:47) (133/78 - 164/86)  BP(mean): 116 (21 May 2025 12:47) (102 - 128)  RR: 18 (21 May 2025 12:47) (14 - 20)  SpO2: 93% (21 May 2025 12:47) (87% - 98%)    Parameters below as of 21 May 2025 12:47  Patient On (Oxygen Delivery Method): room air        I&O's Summary    20 May 2025 07:01  -  21 May 2025 07:00  --------------------------------------------------------  IN: 8470 mL / OUT: 8275 mL / NET: 195 mL    21 May 2025 07:01  -  21 May 2025 16:18  --------------------------------------------------------  IN: 1360 mL / OUT: 1110 mL / NET: 250 mL                              13.2   18.82 )-----------( 361      ( 21 May 2025 07:25 )             39.3     05-21    141  |  101  |  26[H]  ----------------------------<  136[H]  3.8   |  20[L]  |  1.99[H]    Ca    7.4[L]      21 May 2025 07:24  Phos  3.4     05-21  Mg     2.7     05-21    TPro  6.8  /  Alb  3.6  /  TBili  0.4  /  DBili  x   /  AST  22  /  ALT  17  /  AlkPhos  105  05-21                      Review of systems  Gen: No weight changes, fatigue, fevers/chills, weakness  Skin: No rashes  Head/Eyes/Ears/Mouth: No headache; Normal hearing; Normal vision w/o blurriness; No sinus pain/discomfort, sore throat  Respiratory: No dyspnea, cough, wheezing, hemoptysis  CV: No chest pain, PND, orthopnea  GI: Mild abdominal pain at surgical incision site; denies diarrhea, constipation, nausea, vomiting, melena, hematochezia  : No increased frequency, dysuria, hematuria, nocturia  MSK: No joint pain/swelling; no back pain; no edema  Neuro: No dizziness/lightheadedness, weakness, seizures, numbness, tingling  Heme: No easy bruising or bleeding  Endo: No heat/cold intolerance  Psych: No significant nervousness, anxiety, stress, depression  All other systems were reviewed and are negative, except as noted.      PHYSICAL EXAM:  Constitutional: Well developed / well nourished  Eyes: Anicteric, PERRLA  ENMT: nc/at  Neck: supple  Respiratory: CTA B/L  Cardiovascular: RRR  Gastrointestinal: Soft, non distended, mild tenderness at the incision site; incision c/d/i; QUAN x2 serosang  Genitourinary: Urinary catheter in place  Extremities: SCD's in place and working bilaterally  Vascular: +Dopplerable dp pulses bilaterally, feet warm b/l   Neurological: A&O x3  Skin: no rashes, ulcerations or lesions;  Musculoskeletal: Moving all extremities  Psychiatric: Responsive

## 2025-05-21 NOTE — PHYSICAL THERAPY INITIAL EVALUATION ADULT - PERTINENT HX OF CURRENT PROBLEM, REHAB EVAL
55 year old male with PMH HTN, HLD, DMT2 (on Mounjaro and Farxiga, managed by PCP, reports most recent HGA1C around 6%, reports lantus and humalog insulin were d/c'ed and mounjaro was added), childhood asthma (no intubations/no longer uses inhaler), HOLLAND (noncompliant with CPAP), hypothyroidism, depression, CVA (around 2018/2019 - residual parasthesias s/p loop recorder placement and Eliquis BID) with recurrent TIAs (2020), paratracheal nodule s/p removal (benign, 2 years ago at [facility], no difficulty swallowing, unsure of surgeon's name) and CKD (eGFR 16, not currently on dialysis) presents for scheduled open living kidney transplant ot the right side, possible left with Dr. Webber on 5/20/25.  Patient is now s/p LDRT 2a/1v/1u+stent under Simulect induction on 5/20/2025.   US Kidney: No evidence of a significant renal artery stenosis.

## 2025-05-21 NOTE — PHYSICAL THERAPY INITIAL EVALUATION ADULT - DID THE PATIENT HAVE SURGERY?
Problem: SELF HARM/SUICIDALITY  Goal: Will have no self-injury during hospital stay  Description: INTERVENTIONS:  - Q 15 MINUTES: Routine safety checks  - Q WAKING SHIFT & PRN: Assess risk to determine if routine checks are adequate to maintain patient safety  - Encourage patient to participate actively in care by formulating a plan to combat response to suicidal ideation, identify supports and resources  Outcome: Progressing     Problem: PSYCHOSIS  Goal: Will report no hallucinations or delusions  Description: Interventions:  - Administer medication as  ordered  - Every waking shifts and PRN assess for the presence of hallucinations and or delusions  - Assist with reality testing to support increasing orientation  - Assess if patient's hallucinations or delusions are encouraging self-harm or harm to others and intervene as appropriate  Outcome: Progressing     Problem: BEHAVIOR  Goal: Pt/Family maintain appropriate behavior and adhere to behavioral management agreement, if implemented  Description: INTERVENTIONS:  - Assess the family dynamic   - Encourage verbalization of thoughts and concerns in a socially appropriate manner  - Assess patient/family's coping skills and non-compliant behavior (including use of illegal substances).  - Utilize positive, consistent limit setting strategies supporting safety of patient, staff and others  - Initiate consult with Case Management, Spiritual Care or other ancillary services as appropriate  - If a patient's/visitor's behavior jeopardizes the safety of the patient, staff, or others, refer to organization procedure.   - Notify Security of behavior or suspected illegal substances which indicate the need for search of the patient and/or belongings  - Encourage participation in the decision making process about a behavioral management agreement; implement if patient meets criteria  Outcome: Progressing     Problem: Ineffective Coping  Goal: Cooperates with admission  process  Description: Interventions:   - Complete admission process  Outcome: Progressing  Goal: Participates in unit activities  Description: Interventions:  - Provide therapeutic environment   - Provide required programming   - Redirect inappropriate behaviors   Outcome: Progressing     Problem: DISCHARGE PLANNING - CARE MANAGEMENT  Goal: Discharge to post-acute care or home with appropriate resources  Description: INTERVENTIONS:  - Conduct assessment to determine patient/family and health care team treatment goals, and need for post-acute services based on payer coverage, community resources, and patient preferences, and barriers to discharge  - Address psychosocial, clinical, and financial barriers to discharge as identified in assessment in conjunction with the patient/family and health care team  - Arrange appropriate level of post-acute services according to patient’s   needs and preference and payer coverage in collaboration with the physician and health care team  - Communicate with and update the patient/family, physician, and health care team regarding progress on the discharge plan  - Arrange appropriate transportation to post-acute venues  Outcome: Progressing      LDRT/yes

## 2025-05-21 NOTE — CONSULT NOTE ADULT - SUBJECTIVE AND OBJECTIVE BOX
[facility] DIVISION OF KIDNEY DISEASES AND HYPERTENSION -- [phone]  -- INITIAL CONSULT NOTE  --------------------------------------------------------------------------------  HPI:  55M with PMH of HTN, HLD, DMT2 (on Mounjaro and Farxiga, managed by PCP, previously was on lantus and humalog insulin were d/c'ed as mounjaro was added), childhood asthma (no intubations/no longer uses inhaler), HOLLAND (noncompliant with CPAP), hypothyroidism, depression, CVA (around 2018/2019 - residual parasthesias s/p loop recorder placement and Eliquis BID) with recurrent TIAs (2020), paratracheal nodule s/p removal (benign, 2 years ago at [facility], no difficulty swallowing, unsure of surgeon's name) and CKD (eGFR 16, not currently on dialysis) presents for and now s/p pre-emptive LDRT 2a/1v/1u+stent under Simulect induction on 5/20/2025.      Pt feels well okay. Patient's Nephrologist is Dr. Kristen Brenner.      PAST HISTORY  --------------------------------------------------------------------------------  PAST MEDICAL & SURGICAL HISTORY:  Stroke      HTN (hypertension)      Diabetes      Hypothyroidism      Hyperlipidemia      Chronic kidney disease, unspecified      History of TIAs      Childhood asthma      HOLLAND (obstructive sleep apnea)      Numbness and tingling      Tracheal nodule      History of loop recorder      History of coronary angiogram        FAMILY HISTORY:    PAST SOCIAL HISTORY:    ALLERGIES & MEDICATIONS  --------------------------------------------------------------------------------  Allergies    No Known Allergies    Intolerances      Standing Inpatient Medications  aspirin enteric coated 81 milliGRAM(s) Oral daily  chlorhexidine 2% Cloths 1 Application(s) Topical daily  dextrose 5%. 1000 milliLiter(s) IV Continuous <Continuous>  dextrose 5%. 1000 milliLiter(s) IV Continuous <Continuous>  dextrose 50% Injectable 25 Gram(s) IV Push once  dextrose 50% Injectable 12.5 Gram(s) IV Push once  dextrose 50% Injectable 25 Gram(s) IV Push once  famotidine    Tablet 20 milliGRAM(s) Oral daily  glucagon  Injectable 1 milliGRAM(s) IntraMuscular once  insulin lispro (ADMELOG) corrective regimen sliding scale   SubCutaneous three times a day before meals  insulin lispro (ADMELOG) corrective regimen sliding scale   SubCutaneous at bedtime  methylPREDNISolone sodium succinate Injectable 125 milliGRAM(s) IV Push two times a day  methylPREDNISolone sodium succinate Injectable   IV Push   multiple electrolytes Injection Type 1 1000 milliLiter(s) IV Continuous <Continuous>  mycophenolate mofetil 1000 milliGRAM(s) Oral every 12 hours  nystatin    Suspension 369053 Unit(s) Swish and Swallow four times a day  polyethylene glycol 3350 17 Gram(s) Oral daily  senna 2 Tablet(s) Oral at bedtime  trimethoprim   80 mG/sulfamethoxazole 400 mG 1 Tablet(s) Oral daily  valGANciclovir 450 milliGRAM(s) Oral <User Schedule>    PRN Inpatient Medications  dextrose Oral Gel 15 Gram(s) Oral once PRN  ondansetron Injectable 4 milliGRAM(s) IV Push every 6 hours PRN  traMADol 25 milliGRAM(s) Oral every 6 hours PRN  traMADol 50 milliGRAM(s) Oral every 8 hours PRN      REVIEW OF SYSTEMS  --------------------------------------------------------------------------------  Gen: No fevers/chills  Head/Eyes/Ears: No HA  Respiratory: No dyspnea, cough  CV: No chest pain  GI: No abdominal pain, diarrhea  : No dysuria, hematuria  MSK: No  edema  Skin: No rashes  Heme: No easy bruising or bleeding    All other systems were reviewed and are negative, except as noted.    VITALS/PHYSICAL EXAM  --------------------------------------------------------------------------------  T(C): 36.6 (05-21-25 @ 09:00), Max: 37 (05-21-25 @ 02:27)  HR: 87 (05-21-25 @ 11:37) (78 - 96)  BP: 164/86 (05-21-25 @ 11:37) (133/78 - 164/86)  RR: 18 (05-21-25 @ 09:00) (14 - 20)  SpO2: 93% (05-21-25 @ 11:37) (87% - 98%)  Wt(kg): --  Height (cm): 172.7 (05-20-25 @ 09:02)  Weight (kg): 101.2 (05-20-25 @ 09:02)  BMI (kg/m2): 33.9 (05-20-25 @ 09:02)  BSA (m2): 2.14 (05-20-25 @ 09:02)      05-20-25 @ 07:01  -  05-21-25 @ 07:00  --------------------------------------------------------  IN: 8470 mL / OUT: 8275 mL / NET: 195 mL    05-21-25 @ 07:01  -  05-21-25 @ 13:21  --------------------------------------------------------  IN: 1110 mL / OUT: 990 mL / NET: 120 mL        Physical Exam:  	Gen: NAD  	HEENT: Anicteric  	Pulm: CTA B/L  	CV: S1S2+  	Abd: Soft, +BS            Transplant site: RLQ non tender, well healed surgical scar.  	Ext: No LE edema B/L  	Neuro: Awake          : Roper+ with clear urine in the bag  	Skin: Warm and dry  	Dialysis access:     LABS/STUDIES  --------------------------------------------------------------------------------              13.2   18.82 >-----------<  361      [05-21-25 @ 07:25]              39.3     141  |  101  |  26  ----------------------------<  136      [05-21-25 @ 07:24]  3.8   |  20  |  1.99        Ca     7.4     [05-21-25 @ 07:24]      Mg     2.7     [05-21-25 @ 07:24]      Phos  3.4     [05-21-25 @ 07:24]    TPro  6.8  /  Alb  3.6  /  TBili  0.4  /  DBili  x   /  AST  22  /  ALT  17  /  AlkPhos  105  [05-21-25 @ 07:24]    PT/INR: PT 13.0 , INR 1.14       [05-20-25 @ 19:38]  PTT: 32.3       [05-20-25 @ 19:38]      Creatinine Trend:  SCr 1.99 [05-21 @ 07:24]  SCr 2.35 [05-21 @ 00:58]  SCr 3.51 [05-20 @ 19:38]  SCr 4.18 [05-20 @ 08:30]    Urinalysis - [05-21-25 @ 07:24]      Color  / Appearance  / SG  / pH       Gluc 136 / Ketone   / Bili  / Urobili        Blood  / Protein  / Leuk Est  / Nitrite       RBC  / WBC  / Hyaline  / Gran  / Sq Epi  / Non Sq Epi  / Bacteria       HBsAb <3.3      [05-20-25 @ 08:30]  HBsAg Nonreact      [05-20-25 @ 08:30]  HBcAb Nonreact      [05-20-25 @ 08:30]  HCV 0.05, Nonreact      [05-20-25 @ 08:30]  HIV Nonreact      [05-20-25 @ 08:30]    SPEP Interpretation: Normal Electrophoresis Pattern      [10-09-22 @ 05:30]  Immunofixation Urine:   Reference Range: None Detected      [10-09-22 @ 07:47]  UPEP Interpretation: Mild Selective (Glomerular) Proteinuria      [10-09-22 @ 07:47]    Tacrolimus  Cyclosporine  Sirolimus  Mycophenolate  BK PCR  CMV PCR  Parvo PCR  EBV PCR

## 2025-05-21 NOTE — PHYSICAL THERAPY INITIAL EVALUATION ADULT - GENERAL OBSERVATIONS, REHAB EVAL
Chart reviewed events to date noted. Blood glucose reviewed. Pt tolerated 45min PT initial evaluation fair. S/P LDRT 5/20. Rec'd in bed in NAD, +QUAN, arthur, makayla, spouse bedside, premedicated, agreeable to PT.

## 2025-05-21 NOTE — PHYSICAL THERAPY INITIAL EVALUATION ADULT - LIVES WITH, PROFILE
in an apartment with 2 steps to enter & elevator to living floor. Pt reports he was independent prior to admit./spouse

## 2025-05-22 PROBLEM — Z94.0 RENAL TRANSPLANT, STATUS POST: Status: ACTIVE | Noted: 2025-05-21

## 2025-05-22 LAB
ADD ON TEST-SPECIMEN IN LAB: SIGNIFICANT CHANGE UP
ALBUMIN SERPL ELPH-MCNC: 3.5 G/DL — SIGNIFICANT CHANGE UP (ref 3.3–5)
ALP SERPL-CCNC: 110 U/L — SIGNIFICANT CHANGE UP (ref 40–120)
ALT FLD-CCNC: 19 U/L — SIGNIFICANT CHANGE UP (ref 10–45)
ANION GAP SERPL CALC-SCNC: 17 MMOL/L — SIGNIFICANT CHANGE UP (ref 5–17)
AST SERPL-CCNC: 22 U/L — SIGNIFICANT CHANGE UP (ref 10–40)
BASOPHILS # BLD AUTO: 0.02 K/UL — SIGNIFICANT CHANGE UP (ref 0–0.2)
BASOPHILS NFR BLD AUTO: 0.1 % — SIGNIFICANT CHANGE UP (ref 0–2)
BILIRUB SERPL-MCNC: 0.3 MG/DL — SIGNIFICANT CHANGE UP (ref 0.2–1.2)
BUN SERPL-MCNC: 20 MG/DL — SIGNIFICANT CHANGE UP (ref 7–23)
CALCIUM SERPL-MCNC: 7.7 MG/DL — LOW (ref 8.4–10.5)
CHLORIDE SERPL-SCNC: 103 MMOL/L — SIGNIFICANT CHANGE UP (ref 96–108)
CO2 SERPL-SCNC: 18 MMOL/L — LOW (ref 22–31)
CREAT SERPL-MCNC: 1.11 MG/DL — SIGNIFICANT CHANGE UP (ref 0.5–1.3)
EGFR: 79 ML/MIN/1.73M2 — SIGNIFICANT CHANGE UP
EGFR: 79 ML/MIN/1.73M2 — SIGNIFICANT CHANGE UP
EOSINOPHIL # BLD AUTO: 0 K/UL — SIGNIFICANT CHANGE UP (ref 0–0.5)
EOSINOPHIL NFR BLD AUTO: 0 % — SIGNIFICANT CHANGE UP (ref 0–6)
GLUCOSE BLDC GLUCOMTR-MCNC: 142 MG/DL — HIGH (ref 70–99)
GLUCOSE BLDC GLUCOMTR-MCNC: 195 MG/DL — HIGH (ref 70–99)
GLUCOSE BLDC GLUCOMTR-MCNC: 196 MG/DL — HIGH (ref 70–99)
GLUCOSE BLDC GLUCOMTR-MCNC: 231 MG/DL — HIGH (ref 70–99)
GLUCOSE SERPL-MCNC: 145 MG/DL — HIGH (ref 70–99)
HCT VFR BLD CALC: 43.8 % — SIGNIFICANT CHANGE UP (ref 39–50)
HGB BLD-MCNC: 14 G/DL — SIGNIFICANT CHANGE UP (ref 13–17)
IMM GRANULOCYTES NFR BLD AUTO: 0.6 % — SIGNIFICANT CHANGE UP (ref 0–0.9)
LYMPHOCYTES # BLD AUTO: 1.05 K/UL — SIGNIFICANT CHANGE UP (ref 1–3.3)
LYMPHOCYTES # BLD AUTO: 4.4 % — LOW (ref 13–44)
MAGNESIUM SERPL-MCNC: 2.7 MG/DL — HIGH (ref 1.6–2.6)
MCHC RBC-ENTMCNC: 28.4 PG — SIGNIFICANT CHANGE UP (ref 27–34)
MCHC RBC-ENTMCNC: 32 G/DL — SIGNIFICANT CHANGE UP (ref 32–36)
MCV RBC AUTO: 88.8 FL — SIGNIFICANT CHANGE UP (ref 80–100)
MONOCYTES # BLD AUTO: 1.84 K/UL — HIGH (ref 0–0.9)
MONOCYTES NFR BLD AUTO: 7.7 % — SIGNIFICANT CHANGE UP (ref 2–14)
NEUTROPHILS # BLD AUTO: 20.75 K/UL — HIGH (ref 1.8–7.4)
NEUTROPHILS NFR BLD AUTO: 87.2 % — HIGH (ref 43–77)
NRBC BLD AUTO-RTO: 0 /100 WBCS — SIGNIFICANT CHANGE UP (ref 0–0)
PHOSPHATE SERPL-MCNC: 2.5 MG/DL — SIGNIFICANT CHANGE UP (ref 2.5–4.5)
PLATELET # BLD AUTO: 327 K/UL — SIGNIFICANT CHANGE UP (ref 150–400)
POTASSIUM SERPL-MCNC: 4.4 MMOL/L — SIGNIFICANT CHANGE UP (ref 3.5–5.3)
POTASSIUM SERPL-SCNC: 4.4 MMOL/L — SIGNIFICANT CHANGE UP (ref 3.5–5.3)
PROT SERPL-MCNC: 6.9 G/DL — SIGNIFICANT CHANGE UP (ref 6–8.3)
RBC # BLD: 4.93 M/UL — SIGNIFICANT CHANGE UP (ref 4.2–5.8)
RBC # FLD: 14.8 % — HIGH (ref 10.3–14.5)
SODIUM SERPL-SCNC: 138 MMOL/L — SIGNIFICANT CHANGE UP (ref 135–145)
TACROLIMUS SERPL-MCNC: 4 NG/ML — SIGNIFICANT CHANGE UP
WBC # BLD: 23.8 K/UL — HIGH (ref 3.8–10.5)
WBC # FLD AUTO: 23.8 K/UL — HIGH (ref 3.8–10.5)

## 2025-05-22 PROCEDURE — 71045 X-RAY EXAM CHEST 1 VIEW: CPT | Mod: 26

## 2025-05-22 PROCEDURE — 99232 SBSQ HOSP IP/OBS MODERATE 35: CPT | Mod: GC

## 2025-05-22 RX ORDER — VALGANCICLOVIR 450 MG/1
450 TABLET, FILM COATED ORAL DAILY
Refills: 0 | Status: DISCONTINUED | OUTPATIENT
Start: 2025-05-22 | End: 2025-05-25

## 2025-05-22 RX ORDER — APIXABAN 2.5 MG/1
2.5 TABLET, FILM COATED ORAL
Refills: 0 | Status: DISCONTINUED | OUTPATIENT
Start: 2025-05-22 | End: 2025-05-25

## 2025-05-22 RX ADMIN — SERTRALINE 50 MILLIGRAM(S): 100 TABLET, FILM COATED ORAL at 11:05

## 2025-05-22 RX ADMIN — Medication 20 MILLIGRAM(S): at 11:06

## 2025-05-22 RX ADMIN — Medication 81 MILLIGRAM(S): at 11:06

## 2025-05-22 RX ADMIN — APIXABAN 2.5 MILLIGRAM(S): 2.5 TABLET, FILM COATED ORAL at 17:27

## 2025-05-22 RX ADMIN — MYCOPHENOLATE MOFETIL 1000 MILLIGRAM(S): 500 TABLET, FILM COATED ORAL at 19:18

## 2025-05-22 RX ADMIN — Medication 50 MICROGRAM(S): at 05:10

## 2025-05-22 RX ADMIN — METHYLPREDNISOLONE ACETATE 60 MILLIGRAM(S): 80 INJECTION, SUSPENSION INTRA-ARTICULAR; INTRALESIONAL; INTRAMUSCULAR; SOFT TISSUE at 17:27

## 2025-05-22 RX ADMIN — TRAMADOL HYDROCHLORIDE 50 MILLIGRAM(S): 50 TABLET, FILM COATED ORAL at 07:58

## 2025-05-22 RX ADMIN — ATORVASTATIN CALCIUM 80 MILLIGRAM(S): 80 TABLET, FILM COATED ORAL at 22:01

## 2025-05-22 RX ADMIN — Medication 1 TABLET(S): at 11:06

## 2025-05-22 RX ADMIN — TACROLIMUS 5 MILLIGRAM(S): 0.5 CAPSULE ORAL at 07:59

## 2025-05-22 RX ADMIN — Medication 500000 UNIT(S): at 17:27

## 2025-05-22 RX ADMIN — Medication 1 APPLICATION(S): at 11:09

## 2025-05-22 RX ADMIN — METHYLPREDNISOLONE ACETATE 60 MILLIGRAM(S): 80 INJECTION, SUSPENSION INTRA-ARTICULAR; INTRALESIONAL; INTRAMUSCULAR; SOFT TISSUE at 07:58

## 2025-05-22 RX ADMIN — MYCOPHENOLATE MOFETIL 1000 MILLIGRAM(S): 500 TABLET, FILM COATED ORAL at 07:59

## 2025-05-22 RX ADMIN — INSULIN LISPRO 2: 100 INJECTION, SOLUTION INTRAVENOUS; SUBCUTANEOUS at 17:27

## 2025-05-22 RX ADMIN — Medication 500000 UNIT(S): at 11:06

## 2025-05-22 RX ADMIN — INSULIN LISPRO 1: 100 INJECTION, SOLUTION INTRAVENOUS; SUBCUTANEOUS at 11:45

## 2025-05-22 RX ADMIN — EZETIMIBE 10 MILLIGRAM(S): 10 TABLET ORAL at 11:05

## 2025-05-22 RX ADMIN — TRAMADOL HYDROCHLORIDE 50 MILLIGRAM(S): 50 TABLET, FILM COATED ORAL at 09:14

## 2025-05-22 RX ADMIN — POLYETHYLENE GLYCOL 3350 17 GRAM(S): 17 POWDER, FOR SOLUTION ORAL at 11:06

## 2025-05-22 RX ADMIN — VALGANCICLOVIR 450 MILLIGRAM(S): 450 TABLET, FILM COATED ORAL at 11:06

## 2025-05-22 RX ADMIN — BUPROPION HYDROBROMIDE 450 MILLIGRAM(S): 522 TABLET, EXTENDED RELEASE ORAL at 07:58

## 2025-05-22 RX ADMIN — Medication 500000 UNIT(S): at 05:10

## 2025-05-22 RX ADMIN — Medication 2 TABLET(S): at 22:01

## 2025-05-22 NOTE — PROGRESS NOTE ADULT - ASSESSMENT
55M with PMH of HTN, HLD, DMT2 (on Mounjaro and Farxiga, managed by PCP, previously was on lantus and humalog insulin were d/c'ed as mounjaro was added), childhood asthma (no intubations/no longer uses inhaler), HOLLAND (noncompliant with CPAP), hypothyroidism, depression, CVA (around 2018/2019 - residual parasthesias s/p loop recorder placement and Eliquis BID) with recurrent TIAs (2020), paratracheal nodule s/p removal (benign, 2 years ago at [facility], no difficulty swallowing, unsure of surgeon's name) and CKD (eGFR 16, not currently on dialysis) presents for and now s/p pre-emptive LDRT 2a/1v/1u+stent under Simulect induction on 5/20/2025.        S/p LDRT (5/20)  -Renal fx improving with significant UOP. Cr now 1.1  -Patient's Nephrologist is Dr. Kristen Brenner.    IS  Simulect induction  Tacro by level  MMF 1gm BID   SST (short taper with plan to take off steroids)  Ppx: Bactrim, Valcyte, Nystatin      DM  Insulin as needed      HTN  Resume home meds as needed, s/p nifedipine x 1 (5/21)      H/o CVA -on low dose eliquis, lorenzae      Jona Morillo  Nephrology Fellow  Feel free to contact me on TEAMS  After 5 pm and on weekends please contact the on-call Fellow.

## 2025-05-22 NOTE — OCCUPATIONAL THERAPY INITIAL EVALUATION ADULT - ADDITIONAL COMMENTS
PTA pt lived with spouse; in an apartment with 2 steps to enter & elevator to living floor. Pt reports he was independent prior to admit.

## 2025-05-22 NOTE — DIETITIAN INITIAL EVALUATION ADULT - REASON INDICATOR FOR ASSESSMENT
Pt seen for post kidney transplant recipient nutrition evaluation per department protocol.  Information obtained from: Review of pt's current medical record, [facility] and [facility]

## 2025-05-22 NOTE — OCCUPATIONAL THERAPY INITIAL EVALUATION ADULT - BALANCE TRAINING, PT EVAL
GOAL: Pt will demonstrate improved dynamic standing  balance by 1 grade while completing grooming task at sink independently in 3 weeks.

## 2025-05-22 NOTE — DIETITIAN INITIAL EVALUATION ADULT - ENTER TO (CAL/KG)
1. Order for additional liver labs.   2. Due to chronic abdominal pain, unintentional weight loss, ex-smoker, will plan for EGD.    35

## 2025-05-22 NOTE — DIETITIAN INITIAL EVALUATION ADULT - REASON FOR ADMISSION
Events Noted, Chart Reviewed,  " 55y Male PMH is significant for HTN, HLD, DMT2, childhood asthma, HOLLAND, hypothyroidism, depression, CVA with recurrent TIAs (2020), paratracheal nodule s/p removal  no s/p LDRT on 5/20/2025"

## 2025-05-22 NOTE — DIETITIAN INITIAL EVALUATION ADULT - PERTINENT LABORATORY DATA
RN call Paco's father Devon regarding upcoming appointments. He was scheduled with the [facility] Merna RAMIREZ, RN, PHN  [facility]  Office: [phone]  Fax: [phone]         05-22    138  |  103  |  20  ----------------------------<  145[H]  4.4   |  18[L]  |  1.11    Ca    7.7[L]      22 May 2025 07:01  Phos  2.5     05-22  Mg     2.7     05-22    TPro  6.9  /  Alb  3.5  /  TBili  0.3  /  DBili  x   /  AST  22  /  ALT  19  /  AlkPhos  110  05-22    POCT Blood Glucose.: 142 mg/dL (05-22-25 @ 08:11)  POCT Blood Glucose.: 208 mg/dL (05-21-25 @ 21:39)  POCT Blood Glucose.: 245 mg/dL (05-21-25 @ 17:22)  POCT Blood Glucose.: 197 mg/dL (05-21-25 @ 11:58)    A1C with Estimated Average Glucose Result: 6.9 % (05-21-25 @ 07:54)

## 2025-05-22 NOTE — PROGRESS NOTE ADULT - SUBJECTIVE AND OBJECTIVE BOX
[facility] DIVISION OF KIDNEY DISEASES AND HYPERTENSION   FOLLOW UP NOTE    --------------------------------------------------------------------------------  Chief Complaint:    24 hour events/subjective: Pt. was seen and examined today. Feels well, urinating well      PAST HISTORY  --------------------------------------------------------------------------------  No significant changes to PMH, PSH, FHx, SHx, unless otherwise noted    ALLERGIES & MEDICATIONS  --------------------------------------------------------------------------------  Allergies    No Known Allergies    Intolerances      Standing Inpatient Medications  apixaban 2.5 milliGRAM(s) Oral two times a day  aspirin enteric coated 81 milliGRAM(s) Oral daily  atorvastatin 80 milliGRAM(s) Oral at bedtime  buPROPion XL (24-Hour) . 450 milliGRAM(s) Oral daily  chlorhexidine 2% Cloths 1 Application(s) Topical daily  dextrose 5%. 1000 milliLiter(s) IV Continuous <Continuous>  dextrose 5%. 1000 milliLiter(s) IV Continuous <Continuous>  dextrose 50% Injectable 25 Gram(s) IV Push once  dextrose 50% Injectable 12.5 Gram(s) IV Push once  dextrose 50% Injectable 25 Gram(s) IV Push once  ezetimibe 10 milliGRAM(s) Oral daily  glucagon  Injectable 1 milliGRAM(s) IntraMuscular once  insulin lispro (ADMELOG) corrective regimen sliding scale   SubCutaneous three times a day before meals  insulin lispro (ADMELOG) corrective regimen sliding scale   SubCutaneous at bedtime  levothyroxine 50 MICROGram(s) Oral daily  methylPREDNISolone sodium succinate Injectable 60 milliGRAM(s) IV Push two times a day  methylPREDNISolone sodium succinate Injectable   IV Push   multiple electrolytes Injection Type 1 1000 milliLiter(s) IV Continuous <Continuous>  mycophenolate mofetil 1000 milliGRAM(s) Oral every 12 hours  nystatin    Suspension 765935 Unit(s) Swish and Swallow four times a day  pantoprazole    Tablet 40 milliGRAM(s) Oral before breakfast  polyethylene glycol 3350 17 Gram(s) Oral daily  senna 2 Tablet(s) Oral at bedtime  sertraline 50 milliGRAM(s) Oral daily  tacrolimus ER Tablet (ENVARSUS XR) 5 milliGRAM(s) Oral <User Schedule>  trimethoprim   80 mG/sulfamethoxazole 400 mG 1 Tablet(s) Oral daily  valGANciclovir 450 milliGRAM(s) Oral daily    PRN Inpatient Medications  dextrose Oral Gel 15 Gram(s) Oral once PRN  guaifenesin/dextromethorphan Oral Liquid 10 milliLiter(s) Oral every 6 hours PRN  ondansetron Injectable 4 milliGRAM(s) IV Push every 6 hours PRN  traMADol 25 milliGRAM(s) Oral every 6 hours PRN  traMADol 50 milliGRAM(s) Oral every 8 hours PRN      REVIEW OF SYSTEMS  --------------------------------------------------------------------------------    All other systems were reviewed and are negative, except as noted.    VITALS/PHYSICAL EXAM  --------------------------------------------------------------------------------  T(C): 36.9 (05-22-25 @ 12:26), Max: 37.2 (05-21-25 @ 21:42)  HR: 96 (05-22-25 @ 12:26) (88 - 98)  BP: 169/90 (05-22-25 @ 12:26) (158/82 - 179/92)  RR: 20 (05-22-25 @ 12:26) (18 - 20)  SpO2: 93% (05-22-25 @ 12:26) (91% - 95%)  Wt(kg): --        05-21-25 @ 07:01  -  05-22-25 @ 07:00  --------------------------------------------------------  IN: 3810 mL / OUT: 3445 mL / NET: 365 mL    05-22-25 @ 07:01  -  05-22-25 @ 12:50  --------------------------------------------------------  IN: 0 mL / OUT: 560 mL / NET: -560 mL        Physical Exam:  	Gen: NAD  	HEENT: Anicteric  	Pulm: CTA B/L  	CV: S1S2+  	Abd: Soft, +BS           Transplant site: +staples, +drains  	Ext: No LE edema B/L  	Neuro: Awake          :Roper cath+ with clear urine  	Skin: Warm and dry      LABS/STUDIES  --------------------------------------------------------------------------------              14.0   23.80 >-----------<  327      [05-22-25 @ 07:03]              43.8     138  |  103  |  20  ----------------------------<  145      [05-22-25 @ 07:01]  4.4   |  18  |  1.11        Ca     7.7     [05-22-25 @ 07:01]      Mg     2.7     [05-22-25 @ 07:01]      Phos  2.5     [05-22-25 @ 07:01]    TPro  6.9  /  Alb  3.5  /  TBili  0.3  /  DBili  x   /  AST  22  /  ALT  19  /  AlkPhos  110  [05-22-25 @ 07:01]    PT/INR: PT 13.0 , INR 1.14       [05-20-25 @ 19:38]  PTT: 32.3       [05-20-25 @ 19:38]      Creatinine Trend:  SCr 1.11 [05-22 @ 07:01]  SCr 1.99 [05-21 @ 07:24]  SCr 2.35 [05-21 @ 00:58]  SCr 3.51 [05-20 @ 19:38]  SCr 4.18 [05-20 @ 08:30]    Urinalysis - [05-22-25 @ 07:01]      Color  / Appearance  / SG  / pH       Gluc 145 / Ketone   / Bili  / Urobili        Blood  / Protein  / Leuk Est  / Nitrite       RBC  / WBC  / Hyaline  / Gran  / Sq Epi  / Non Sq Epi  / Bacteria       HBsAb <3.3      [05-20-25 @ 08:30]  HBsAg Nonreact      [05-20-25 @ 08:30]  HBcAb Nonreact      [05-20-25 @ 08:30]  HCV 0.05, Nonreact      [05-20-25 @ 08:30]  HIV Nonreact      [05-20-25 @ 08:30]      Tacrolimus  Cyclosporine  Sirolimus  Mycophenolate  BK PCR  CMV PCR  Parvo PCR  EBV PCR

## 2025-05-22 NOTE — DIETITIAN INITIAL EVALUATION ADULT - OBTAIN WEEKLY WEIGHT
H&P note has been confirmed for the patient. Procedural consent has been signed.  Staff has reviewed the patients labs.  yes

## 2025-05-22 NOTE — PROGRESS NOTE ADULT - ASSESSMENT
55 year old male with PMH HTN, HLD, DMT2 (on Mounjaro and Farxiga, managed by PCP, reports most recent HGA1C around 6%, reports lantus and humalog insulin were d/c'ed and mounjaro was added), childhood asthma (no intubations/no longer uses inhaler), HOLLAND (noncompliant with CPAP), hypothyroidism, depression, CVA (around 2018/2019 - residual parasthesias s/p loop recorder placement and Eliquis BID) with recurrent TIAs (2020), paratracheal nodule s/p removal (benign, 2 years ago at [facility], no difficulty swallowing, unsure of surgeon's name) and CKD (eGFR 16, not currently on dialysis) presents for scheduled open living kidney transplant ot the right side, possible left with Dr. Webber.    Patient is now s/p LDRT 2a/1v/1u+stent under Simulect induction on 5/20/2025.     [] POD#2 LDRT  - trend labs, vital signs  - Strict I's&O's, JPx1, gibson,   - Renal Doppler: patent, homogenous flow, no collection, no RONAN.   - fluids @ 70cc/hr  - pain control  - bowel regimen  - reg diet  - SCDs/IS/ PT      [] IMMUNO  - Simulect induction (next dose due POD#4)  - Env by level , MMF 1g BID, SST    [] DVT PPX  - SCDs  - SQH to be discussed when to begin daily  - resumed home Eliquis

## 2025-05-22 NOTE — DIETITIAN INITIAL EVALUATION ADULT - ADD RECOMMEND
1) Continue Kosher Consistent Carbohydrate diet   2) RD will add PowerAde electrolyte drink to meal trays to help meet increased post-Txp fluid needs of ~3L.   3) Reinforce post-transplant nutrition therapy and food safety guidelines in-house and prior to discharge.   4) Discharge diet: Continue as above. Recommend follow up visit with Transplant MD and outpatient RD for dietary modifications as warranted.  5) Monitor PO intake,  Urine Output, GI tolerance, skin integrity,and labs. RD remains available if needed, pt is aware.

## 2025-05-22 NOTE — PROGRESS NOTE ADULT - SUBJECTIVE AND OBJECTIVE BOX
Transplant Surgery - Multidisciplinary Rounds  --------------------------------------------------------------   Tx Date:  5/20/2025 LDRT       POD#2    Patient seen and examined with Surgeon Dr. Carmichael, Nephrologist Dr. SOFY Kinney, ACP Romario/Angelica, Pharmacist Ethel, and unit RN. Disciplines not in attendance will be notified of the plan     HPI: 55 year old male with PMH HTN, HLD, DMT2 (on Mounjaro and Farxiga, managed by PCP, reports most recent HGA1C around 6%, reports lantus and humalog insulin were d/c'ed and mounjaro was added), childhood asthma (no intubations/no longer uses inhaler), HOLLAND (noncompliant with CPAP), hypothyroidism, depression, CVA (around 2018/2019 - residual parasthesias s/p loop recorder placement and Eliquis BID) with recurrent TIAs (2020), paratracheal nodule s/p removal (benign, 2 years ago at [facility], no difficulty swallowing, unsure of surgeon's name) and CKD (eGFR 16, not currently on dialysis) presents for scheduled open living kidney transplant ot the right side, possible left with Dr. Webber.    Patient is now s/p LDRT 2a/1v/1u+stent under Simulect induction on 5/20/2025.       Interval Events:  - afebrile, hypertensive, started nifed 30  - d/c replacements, on standing IVF  - making good UOP  - graft improving appropriately       Immunosupression:  Induction: Simulect  Maintenance: FK by level/MMF/SST  Ongoing monitoring for signs of rejection     Potential Discharge date: TBD  Education:  Medications  Plan of care:  See Below        MEDICATIONS  (STANDING):  apixaban 2.5 milliGRAM(s) Oral two times a day  aspirin enteric coated 81 milliGRAM(s) Oral daily  atorvastatin 80 milliGRAM(s) Oral at bedtime  buPROPion XL (24-Hour) . 450 milliGRAM(s) Oral daily  chlorhexidine 2% Cloths 1 Application(s) Topical daily  dextrose 5%. 1000 milliLiter(s) (100 mL/Hr) IV Continuous <Continuous>  dextrose 5%. 1000 milliLiter(s) (50 mL/Hr) IV Continuous <Continuous>  dextrose 50% Injectable 25 Gram(s) IV Push once  dextrose 50% Injectable 12.5 Gram(s) IV Push once  dextrose 50% Injectable 25 Gram(s) IV Push once  ezetimibe 10 milliGRAM(s) Oral daily  glucagon  Injectable 1 milliGRAM(s) IntraMuscular once  insulin lispro (ADMELOG) corrective regimen sliding scale   SubCutaneous three times a day before meals  insulin lispro (ADMELOG) corrective regimen sliding scale   SubCutaneous at bedtime  levothyroxine 50 MICROGram(s) Oral daily  methylPREDNISolone sodium succinate Injectable 60 milliGRAM(s) IV Push two times a day  methylPREDNISolone sodium succinate Injectable   IV Push   multiple electrolytes Injection Type 1 1000 milliLiter(s) (70 mL/Hr) IV Continuous <Continuous>  mycophenolate mofetil 1000 milliGRAM(s) Oral every 12 hours  nystatin    Suspension 643037 Unit(s) Swish and Swallow four times a day  pantoprazole    Tablet 40 milliGRAM(s) Oral before breakfast  polyethylene glycol 3350 17 Gram(s) Oral daily  senna 2 Tablet(s) Oral at bedtime  sertraline 50 milliGRAM(s) Oral daily  tacrolimus ER Tablet (ENVARSUS XR) 5 milliGRAM(s) Oral <User Schedule>  trimethoprim   80 mG/sulfamethoxazole 400 mG 1 Tablet(s) Oral daily  valGANciclovir 450 milliGRAM(s) Oral daily    MEDICATIONS  (PRN):  dextrose Oral Gel 15 Gram(s) Oral once PRN Blood Glucose LESS THAN 70 milliGRAM(s)/deciliter  guaifenesin/dextromethorphan Oral Liquid 10 milliLiter(s) Oral every 6 hours PRN Cough  ondansetron Injectable 4 milliGRAM(s) IV Push every 6 hours PRN Nausea and/or Vomiting  traMADol 25 milliGRAM(s) Oral every 6 hours PRN Moderate Pain (4 - 6)  traMADol 50 milliGRAM(s) Oral every 8 hours PRN Severe Pain (7 - 10)      PAST MEDICAL & SURGICAL HISTORY:  Stroke      HTN (hypertension)      Diabetes      Hypothyroidism      Hyperlipidemia      Chronic kidney disease, unspecified      History of TIAs      Childhood asthma      HOLLAND (obstructive sleep apnea)      Numbness and tingling      Tracheal nodule      History of loop recorder      History of coronary angiogram          Vital Signs Last 24 Hrs  T(C): 36.9 (22 May 2025 12:26), Max: 37.2 (21 May 2025 21:42)  T(F): 98.5 (22 May 2025 12:26), Max: 98.9 (21 May 2025 21:42)  HR: 96 (22 May 2025 12:26) (88 - 98)  BP: 169/90 (22 May 2025 12:26) (158/82 - 179/92)  BP(mean): 124 (22 May 2025 05:10) (121 - 129)  RR: 20 (22 May 2025 12:26) (18 - 20)  SpO2: 93% (22 May 2025 12:26) (91% - 95%)    Parameters below as of 22 May 2025 12:26  Patient On (Oxygen Delivery Method): room air        I&O's Summary    21 May 2025 07:01  -  22 May 2025 07:00  --------------------------------------------------------  IN: 3810 mL / OUT: 3445 mL / NET: 365 mL    22 May 2025 07:01  -  22 May 2025 13:11  --------------------------------------------------------  IN: 0 mL / OUT: 560 mL / NET: -560 mL                              14.0   23.80 )-----------( 327      ( 22 May 2025 07:03 )             43.8     05-22    138  |  103  |  20  ----------------------------<  145[H]  4.4   |  18[L]  |  1.11    Ca    7.7[L]      22 May 2025 07:01  Phos  2.5     05-22  Mg     2.7     05-22    TPro  6.9  /  Alb  3.5  /  TBili  0.3  /  DBili  x   /  AST  22  /  ALT  19  /  AlkPhos  110  05-22                      Review of systems  Gen: No weight changes, fatigue, fevers/chills, weakness  Skin: No rashes  Head/Eyes/Ears/Mouth: No headache; Normal hearing; Normal vision w/o blurriness; No sinus pain/discomfort, sore throat  Respiratory: No dyspnea, cough, wheezing, hemoptysis  CV: No chest pain, PND, orthopnea  GI: Mild abdominal pain at surgical incision site; denies diarrhea, constipation, nausea, vomiting, melena, hematochezia  : No increased frequency, dysuria, hematuria, nocturia  MSK: No joint pain/swelling; no back pain; no edema  Neuro: No dizziness/lightheadedness, weakness, seizures, numbness, tingling  Heme: No easy bruising or bleeding  Endo: No heat/cold intolerance  Psych: No significant nervousness, anxiety, stress, depression  All other systems were reviewed and are negative, except as noted.      PHYSICAL EXAM:  Constitutional: Well developed / well nourished  Eyes: Anicteric, PERRLA  ENMT: nc/at  Neck: supple  Respiratory: CTA B/L  Cardiovascular: RRR  Gastrointestinal: Soft, non distended, mild tenderness at the incision site; incision c/d/i; QUAN x1 serosang  Genitourinary: Urinary catheter in place  Extremities: SCD's in place and working bilaterally  Vascular: +Dopplerable dp pulses bilaterally, feet warm b/l   Neurological: A&O x3  Skin: no rashes, ulcerations or lesions;  Musculoskeletal: Moving all extremities  Psychiatric: Responsive

## 2025-05-22 NOTE — DIETITIAN INITIAL EVALUATION ADULT - NSICDXPASTMEDICALHX_GEN_ALL_CORE_FT
PAST MEDICAL HISTORY:  Childhood asthma     Chronic kidney disease, unspecified     Diabetes     History of TIAs     HTN (hypertension)     Hyperlipidemia     Hypothyroidism     Numbness and tingling     HOLLAND (obstructive sleep apnea)     Stroke

## 2025-05-22 NOTE — DIETITIAN INITIAL EVALUATION ADULT - PHYSCIAL ASSESSMENT
Weights:  - Source: Wife  - UBW: 220 pounds   - Reported weight changes: Denies any     Current Admission Weights:  - Dosing weight: 101.2  kg/223 pounds  (5/20/25)   - Daily weight:  99.5 kg/219.4 pounds  (05-22)    Weight History per  [facility] HIE:  - 106.1 kg/ 234 pounds (11/5/24)    Weight Change:  - Weight fluctuations in setting of fluid shifts (IVF, intraoperative fluids). Will continue to monitor weight trends as available/able.     IBW:154  pounds   %IBW: 142%

## 2025-05-22 NOTE — OCCUPATIONAL THERAPY INITIAL EVALUATION ADULT - PERTINENT HX OF CURRENT PROBLEM, REHAB EVAL
55 year old male with PMH HTN, HLD, DMT2 (on Mounjaro and Farxiga, managed by PCP, reports most recent HGA1C around 6%, reports lantus and humalog insulin were d/c'ed and mounjaro was added), childhood asthma (no intubations/no longer uses inhaler), HOLLAND (noncompliant with CPAP), hypothyroidism, depression, CVA (around 2018/2019 - residual parasthesias s/p loop recorder placement and Eliquis BID) with recurrent TIAs (2020), paratracheal nodule s/p removal (benign, 2 years ago at [facility], no difficulty swallowing, unsure of surgeon's name) and CKD (eGFR 16, not currently on dialysis) presents to PST for scheduled open living kidney transplant ot the right side, possible left with Dr. Webber on 5/20/25.Patient is now s/p LDRT 2a/1v/1u+stent under Simulect induction on 5/20/2025.   XRAY CHEST: Pulmonary edema changes with trace bilateral pleural effusions.  US KIDNEY : No evidence of a significant renal artery stenosis.

## 2025-05-22 NOTE — DIETITIAN INITIAL EVALUATION ADULT - EDUCATION DIETARY MODIFICATIONS
Provided education on post transplant nutrition therapy and food safety guidelines for transplant recipients. Discussed importance of thoroughly washing all fresh fruits/vegetables, importance of avoiding uncooked/raw/unpasteurized foods, avoiding pre-made deli/buffet/salad bar meals. Foods recommended as healthy well balanced diet and importance of adequate protein intakes for proper post-surgical healing discussed. Reviewed recommendations to avoid grapefruit, pomegranate and star fruit while taking immunosuppressant medication. Reviewed recommendations for moderate intake of sodium and carbohydrates with transplant medications. Reviewed effect of steroids on BG levels and importance of limiting concentrated sweets. Pt was receptive and expressed understanding.All questions answered/show back/(1) partially meets; needs review/practice/verbalization

## 2025-05-22 NOTE — DIETITIAN INITIAL EVALUATION ADULT - OTHER INFO
Pt is s/p LDRT 5/20/25 POD#2:  -- Simulect, Solu-medrol, Tacrolimus, Cellcept transplant meds ordered  -- BG management:  insulin sliding scale, current A1c=6.9 % (05-21-25)  -- Urine output per flow sheets 290 ml thus far (5/22), 3,320 ml (5/21), 8,160 ml (5/20)   -- Will provide Powerade Zero 3xday for urine output >3L.

## 2025-05-22 NOTE — DIETITIAN INITIAL EVALUATION ADULT - ORAL INTAKE PTA/DIET HISTORY
Pt confirms no known food allergies/intolerances. Reports having a good appetite and PO intakes at home. Follows a Kosher diet. Pt denies nausea, vomiting, diarrhea, or constipation. Denies difficulty chewing/swallowing. Denies any vitamin/mineral use at home, pt reports not taking oral nutritional supplement at home as well.

## 2025-05-22 NOTE — DIETITIAN INITIAL EVALUATION ADULT - PERTINENT MEDS FT
MEDICATIONS  (STANDING):  aspirin enteric coated 81 milliGRAM(s) Oral daily  atorvastatin 80 milliGRAM(s) Oral at bedtime  buPROPion XL (24-Hour) . 450 milliGRAM(s) Oral daily  chlorhexidine 2% Cloths 1 Application(s) Topical daily  dextrose 5%. 1000 milliLiter(s) (100 mL/Hr) IV Continuous <Continuous>  dextrose 5%. 1000 milliLiter(s) (50 mL/Hr) IV Continuous <Continuous>  dextrose 50% Injectable 25 Gram(s) IV Push once  dextrose 50% Injectable 12.5 Gram(s) IV Push once  dextrose 50% Injectable 25 Gram(s) IV Push once  ezetimibe 10 milliGRAM(s) Oral daily  famotidine    Tablet 20 milliGRAM(s) Oral daily  glucagon  Injectable 1 milliGRAM(s) IntraMuscular once  insulin lispro (ADMELOG) corrective regimen sliding scale   SubCutaneous three times a day before meals  insulin lispro (ADMELOG) corrective regimen sliding scale   SubCutaneous at bedtime  levothyroxine 50 MICROGram(s) Oral daily  methylPREDNISolone sodium succinate Injectable 60 milliGRAM(s) IV Push two times a day  methylPREDNISolone sodium succinate Injectable   IV Push   multiple electrolytes Injection Type 1 1000 milliLiter(s) (150 mL/Hr) IV Continuous <Continuous>  mycophenolate mofetil 1000 milliGRAM(s) Oral every 12 hours  nystatin    Suspension 233091 Unit(s) Swish and Swallow four times a day  polyethylene glycol 3350 17 Gram(s) Oral daily  senna 2 Tablet(s) Oral at bedtime  sertraline 50 milliGRAM(s) Oral daily  tacrolimus ER Tablet (ENVARSUS XR) 5 milliGRAM(s) Oral <User Schedule>  trimethoprim   80 mG/sulfamethoxazole 400 mG 1 Tablet(s) Oral daily  valGANciclovir 450 milliGRAM(s) Oral <User Schedule>    MEDICATIONS  (PRN):  dextrose Oral Gel 15 Gram(s) Oral once PRN Blood Glucose LESS THAN 70 milliGRAM(s)/deciliter  guaifenesin/dextromethorphan Oral Liquid 10 milliLiter(s) Oral every 6 hours PRN Cough  ondansetron Injectable 4 milliGRAM(s) IV Push every 6 hours PRN Nausea and/or Vomiting  traMADol 25 milliGRAM(s) Oral every 6 hours PRN Moderate Pain (4 - 6)  traMADol 50 milliGRAM(s) Oral every 8 hours PRN Severe Pain (7 - 10)

## 2025-05-23 LAB
ALBUMIN SERPL ELPH-MCNC: 3.8 G/DL — SIGNIFICANT CHANGE UP (ref 3.3–5)
ALP SERPL-CCNC: 109 U/L — SIGNIFICANT CHANGE UP (ref 40–120)
ALT FLD-CCNC: 17 U/L — SIGNIFICANT CHANGE UP (ref 10–45)
ANION GAP SERPL CALC-SCNC: 17 MMOL/L — SIGNIFICANT CHANGE UP (ref 5–17)
AST SERPL-CCNC: 18 U/L — SIGNIFICANT CHANGE UP (ref 10–40)
BASOPHILS # BLD AUTO: 0.02 K/UL — SIGNIFICANT CHANGE UP (ref 0–0.2)
BASOPHILS NFR BLD AUTO: 0.1 % — SIGNIFICANT CHANGE UP (ref 0–2)
BILIRUB SERPL-MCNC: 0.4 MG/DL — SIGNIFICANT CHANGE UP (ref 0.2–1.2)
BLD GP AB SCN SERPL QL: NEGATIVE — SIGNIFICANT CHANGE UP
BUN SERPL-MCNC: 20 MG/DL — SIGNIFICANT CHANGE UP (ref 7–23)
CALCIUM SERPL-MCNC: 8.9 MG/DL — SIGNIFICANT CHANGE UP (ref 8.4–10.5)
CHLORIDE SERPL-SCNC: 104 MMOL/L — SIGNIFICANT CHANGE UP (ref 96–108)
CO2 SERPL-SCNC: 16 MMOL/L — LOW (ref 22–31)
CREAT SERPL-MCNC: 1 MG/DL — SIGNIFICANT CHANGE UP (ref 0.5–1.3)
EGFR: 90 ML/MIN/1.73M2 — SIGNIFICANT CHANGE UP
EGFR: 90 ML/MIN/1.73M2 — SIGNIFICANT CHANGE UP
EOSINOPHIL # BLD AUTO: 0 K/UL — SIGNIFICANT CHANGE UP (ref 0–0.5)
EOSINOPHIL NFR BLD AUTO: 0 % — SIGNIFICANT CHANGE UP (ref 0–6)
GLUCOSE BLDC GLUCOMTR-MCNC: 166 MG/DL — HIGH (ref 70–99)
GLUCOSE BLDC GLUCOMTR-MCNC: 185 MG/DL — HIGH (ref 70–99)
GLUCOSE BLDC GLUCOMTR-MCNC: 196 MG/DL — HIGH (ref 70–99)
GLUCOSE BLDC GLUCOMTR-MCNC: 256 MG/DL — HIGH (ref 70–99)
GLUCOSE SERPL-MCNC: 149 MG/DL — HIGH (ref 70–99)
HCT VFR BLD CALC: 44.1 % — SIGNIFICANT CHANGE UP (ref 39–50)
HGB BLD-MCNC: 14.3 G/DL — SIGNIFICANT CHANGE UP (ref 13–17)
IMM GRANULOCYTES NFR BLD AUTO: 0.6 % — SIGNIFICANT CHANGE UP (ref 0–0.9)
LYMPHOCYTES # BLD AUTO: 1.53 K/UL — SIGNIFICANT CHANGE UP (ref 1–3.3)
LYMPHOCYTES # BLD AUTO: 8 % — LOW (ref 13–44)
MAGNESIUM SERPL-MCNC: 2.7 MG/DL — HIGH (ref 1.6–2.6)
MCHC RBC-ENTMCNC: 28.3 PG — SIGNIFICANT CHANGE UP (ref 27–34)
MCHC RBC-ENTMCNC: 32.4 G/DL — SIGNIFICANT CHANGE UP (ref 32–36)
MCV RBC AUTO: 87.2 FL — SIGNIFICANT CHANGE UP (ref 80–100)
MONOCYTES # BLD AUTO: 1.64 K/UL — HIGH (ref 0–0.9)
MONOCYTES NFR BLD AUTO: 8.6 % — SIGNIFICANT CHANGE UP (ref 2–14)
NEUTROPHILS # BLD AUTO: 15.74 K/UL — HIGH (ref 1.8–7.4)
NEUTROPHILS NFR BLD AUTO: 82.7 % — HIGH (ref 43–77)
NRBC BLD AUTO-RTO: 0 /100 WBCS — SIGNIFICANT CHANGE UP (ref 0–0)
PHOSPHATE SERPL-MCNC: 2.5 MG/DL — SIGNIFICANT CHANGE UP (ref 2.5–4.5)
PLATELET # BLD AUTO: 373 K/UL — SIGNIFICANT CHANGE UP (ref 150–400)
POTASSIUM SERPL-MCNC: 4.4 MMOL/L — SIGNIFICANT CHANGE UP (ref 3.5–5.3)
POTASSIUM SERPL-SCNC: 4.4 MMOL/L — SIGNIFICANT CHANGE UP (ref 3.5–5.3)
PROT SERPL-MCNC: 7.3 G/DL — SIGNIFICANT CHANGE UP (ref 6–8.3)
RBC # BLD: 5.06 M/UL — SIGNIFICANT CHANGE UP (ref 4.2–5.8)
RBC # FLD: 14.6 % — HIGH (ref 10.3–14.5)
RH IG SCN BLD-IMP: POSITIVE — SIGNIFICANT CHANGE UP
SODIUM SERPL-SCNC: 137 MMOL/L — SIGNIFICANT CHANGE UP (ref 135–145)
TACROLIMUS SERPL-MCNC: 6.5 NG/ML — SIGNIFICANT CHANGE UP
WBC # BLD: 19.04 K/UL — HIGH (ref 3.8–10.5)
WBC # FLD AUTO: 19.04 K/UL — HIGH (ref 3.8–10.5)

## 2025-05-23 PROCEDURE — 99232 SBSQ HOSP IP/OBS MODERATE 35: CPT | Mod: GC

## 2025-05-23 RX ORDER — MYCOPHENOLATE MOFETIL 500 MG/1
1000 TABLET, FILM COATED ORAL
Refills: 0 | Status: DISCONTINUED | OUTPATIENT
Start: 2025-05-23 | End: 2025-05-23

## 2025-05-23 RX ORDER — MYCOPHENOLIC ACID 360 MG/1
2 TABLET, DELAYED RELEASE ORAL
Qty: 120 | Refills: 11
Start: 2025-05-23 | End: 2026-05-17

## 2025-05-23 RX ORDER — CALCIUM CARBONATE 750 MG/1
1 TABLET ORAL ONCE
Refills: 0 | Status: COMPLETED | OUTPATIENT
Start: 2025-05-23 | End: 2025-05-23

## 2025-05-23 RX ORDER — MELATONIN 5 MG
1 TABLET ORAL AT BEDTIME
Refills: 0 | Status: COMPLETED | OUTPATIENT
Start: 2025-05-23 | End: 2025-05-23

## 2025-05-23 RX ORDER — MYCOPHENOLIC ACID 360 MG/1
720 TABLET, DELAYED RELEASE ORAL
Refills: 0 | Status: DISCONTINUED | OUTPATIENT
Start: 2025-05-23 | End: 2025-05-25

## 2025-05-23 RX ADMIN — CALCIUM CARBONATE 1 TABLET(S): 750 TABLET ORAL at 11:53

## 2025-05-23 RX ADMIN — METHYLPREDNISOLONE ACETATE 30 MILLIGRAM(S): 80 INJECTION, SUSPENSION INTRA-ARTICULAR; INTRALESIONAL; INTRAMUSCULAR; SOFT TISSUE at 17:07

## 2025-05-23 RX ADMIN — Medication 1 APPLICATION(S): at 12:25

## 2025-05-23 RX ADMIN — INSULIN LISPRO 1: 100 INJECTION, SOLUTION INTRAVENOUS; SUBCUTANEOUS at 12:03

## 2025-05-23 RX ADMIN — Medication 2 TABLET(S): at 22:03

## 2025-05-23 RX ADMIN — TRAMADOL HYDROCHLORIDE 50 MILLIGRAM(S): 50 TABLET, FILM COATED ORAL at 10:30

## 2025-05-23 RX ADMIN — ATORVASTATIN CALCIUM 80 MILLIGRAM(S): 80 TABLET, FILM COATED ORAL at 22:04

## 2025-05-23 RX ADMIN — Medication 40 MILLIGRAM(S): at 05:22

## 2025-05-23 RX ADMIN — INSULIN LISPRO 1: 100 INJECTION, SOLUTION INTRAVENOUS; SUBCUTANEOUS at 17:07

## 2025-05-23 RX ADMIN — Medication 500000 UNIT(S): at 22:03

## 2025-05-23 RX ADMIN — SERTRALINE 50 MILLIGRAM(S): 100 TABLET, FILM COATED ORAL at 11:54

## 2025-05-23 RX ADMIN — VALGANCICLOVIR 450 MILLIGRAM(S): 450 TABLET, FILM COATED ORAL at 11:54

## 2025-05-23 RX ADMIN — Medication 10 MILLIGRAM(S): at 23:15

## 2025-05-23 RX ADMIN — APIXABAN 2.5 MILLIGRAM(S): 2.5 TABLET, FILM COATED ORAL at 17:06

## 2025-05-23 RX ADMIN — Medication 40 MILLIGRAM(S): at 17:06

## 2025-05-23 RX ADMIN — Medication 1 MILLIGRAM(S): at 01:15

## 2025-05-23 RX ADMIN — Medication 81 MILLIGRAM(S): at 11:54

## 2025-05-23 RX ADMIN — APIXABAN 2.5 MILLIGRAM(S): 2.5 TABLET, FILM COATED ORAL at 05:22

## 2025-05-23 RX ADMIN — POLYETHYLENE GLYCOL 3350 17 GRAM(S): 17 POWDER, FOR SOLUTION ORAL at 11:55

## 2025-05-23 RX ADMIN — DEXTROMETHORPHAN HBR, GUAIFENESIN 10 MILLILITER(S): 20; 200 SOLUTION ORAL at 08:11

## 2025-05-23 RX ADMIN — Medication 500000 UNIT(S): at 00:23

## 2025-05-23 RX ADMIN — BUPROPION HYDROBROMIDE 450 MILLIGRAM(S): 522 TABLET, EXTENDED RELEASE ORAL at 08:11

## 2025-05-23 RX ADMIN — INSULIN LISPRO 1: 100 INJECTION, SOLUTION INTRAVENOUS; SUBCUTANEOUS at 22:04

## 2025-05-23 RX ADMIN — EZETIMIBE 10 MILLIGRAM(S): 10 TABLET ORAL at 11:54

## 2025-05-23 RX ADMIN — INSULIN LISPRO 1: 100 INJECTION, SOLUTION INTRAVENOUS; SUBCUTANEOUS at 08:11

## 2025-05-23 RX ADMIN — Medication 500000 UNIT(S): at 11:53

## 2025-05-23 RX ADMIN — Medication 500000 UNIT(S): at 17:06

## 2025-05-23 RX ADMIN — TRAMADOL HYDROCHLORIDE 50 MILLIGRAM(S): 50 TABLET, FILM COATED ORAL at 09:30

## 2025-05-23 RX ADMIN — METHYLPREDNISOLONE ACETATE 30 MILLIGRAM(S): 80 INJECTION, SUSPENSION INTRA-ARTICULAR; INTRALESIONAL; INTRAMUSCULAR; SOFT TISSUE at 05:22

## 2025-05-23 RX ADMIN — MYCOPHENOLATE MOFETIL 1000 MILLIGRAM(S): 500 TABLET, FILM COATED ORAL at 08:11

## 2025-05-23 RX ADMIN — Medication 1 TABLET(S): at 11:54

## 2025-05-23 RX ADMIN — TACROLIMUS 5 MILLIGRAM(S): 0.5 CAPSULE ORAL at 08:11

## 2025-05-23 RX ADMIN — Medication 500000 UNIT(S): at 05:23

## 2025-05-23 RX ADMIN — MYCOPHENOLIC ACID 720 MILLIGRAM(S): 360 TABLET, DELAYED RELEASE ORAL at 19:34

## 2025-05-23 RX ADMIN — Medication 50 MICROGRAM(S): at 05:22

## 2025-05-23 NOTE — PROGRESS NOTE ADULT - SUBJECTIVE AND OBJECTIVE BOX
"Occupational Therapy   Treatment    Name: Ed Patton  MRN: [id number]  Admitting Diagnosis:  Septic shock due to methicillin resistant Staphylococcus aureus  28 Days Post-Op    Recommendations:     Discharge Recommendations: nursing facility, skilled  Discharge Equipment Recommendations:  wheelchair, bath bench  Barriers to discharge:  Decreased caregiver support, Inaccessible home environment    Assessment:     Ed Patton is a 65 y.o. male with a medical diagnosis of Septic shock due to methicillin resistant Staphylococcus aureus.  Pt presents with decreased endurance and impaired mobility performance as limited by cardiovascular status and generalized weakness. Pt participated fairly in OT/PT session this morning. Pt continues to display delayed processing in cognition and requires additional time for mobility 2/2 "needing time."  Pt required min (A) for bed mobility and max (A)X2 to stand pivot to bedside chair. Pt wearing L darco shoe and adhering to WBAT throughout pivot to chair. At this time, pt is not safe to return home and is not at baseline. Pt would benefit from continued OT skilled services 3x/wk to improve daily living skills to optimize QOL. Pt is recommended to discharge to SNF at this time.    Performance deficits affecting function are impaired self care skills, weakness, impaired balance, decreased coordination, decreased safety awareness, impaired endurance, impaired functional mobilty, decreased upper extremity function, decreased lower extremity function, gait instability, impaired cognition, impaired cardiopulmonary response to activity, impaired fine motor, pain, edema, orthopedic precautions.     Rehab Prognosis:  Good; patient would benefit from acute skilled OT services to address these deficits and reach maximum level of function.       Plan:     Patient to be seen 3 x/week to address the above listed problems via self-care/home management, therapeutic activities, therapeutic " [facility] DIVISION OF KIDNEY DISEASES AND HYPERTENSION   FOLLOW UP NOTE    --------------------------------------------------------------------------------  Chief Complaint:    24 hour events/subjective: Pt. was seen and examined today. Feels well, urinating well      PAST HISTORY  --------------------------------------------------------------------------------  No significant changes to PMH, PSH, FHx, SHx, unless otherwise noted    ALLERGIES & MEDICATIONS  --------------------------------------------------------------------------------  Allergies    No Known Allergies    Intolerances      Standing Inpatient Medications  apixaban 2.5 milliGRAM(s) Oral two times a day  aspirin enteric coated 81 milliGRAM(s) Oral daily  atorvastatin 80 milliGRAM(s) Oral at bedtime  buPROPion XL (24-Hour) . 450 milliGRAM(s) Oral daily  chlorhexidine 2% Cloths 1 Application(s) Topical daily  dextrose 5%. 1000 milliLiter(s) IV Continuous <Continuous>  dextrose 5%. 1000 milliLiter(s) IV Continuous <Continuous>  dextrose 50% Injectable 25 Gram(s) IV Push once  dextrose 50% Injectable 12.5 Gram(s) IV Push once  dextrose 50% Injectable 25 Gram(s) IV Push once  ezetimibe 10 milliGRAM(s) Oral daily  glucagon  Injectable 1 milliGRAM(s) IntraMuscular once  insulin lispro (ADMELOG) corrective regimen sliding scale   SubCutaneous three times a day before meals  insulin lispro (ADMELOG) corrective regimen sliding scale   SubCutaneous at bedtime  levothyroxine 50 MICROGram(s) Oral daily  methylPREDNISolone sodium succinate Injectable 30 milliGRAM(s) IV Push two times a day  methylPREDNISolone sodium succinate Injectable   IV Push   mycophenolic acid  milliGRAM(s) Oral <User Schedule>  nystatin    Suspension 691759 Unit(s) Swish and Swallow four times a day  pantoprazole    Tablet 40 milliGRAM(s) Oral two times a day  polyethylene glycol 3350 17 Gram(s) Oral daily  senna 2 Tablet(s) Oral at bedtime  sertraline 50 milliGRAM(s) Oral daily  tacrolimus ER Tablet (ENVARSUS XR) 5 milliGRAM(s) Oral <User Schedule>  trimethoprim   80 mG/sulfamethoxazole 400 mG 1 Tablet(s) Oral daily  valGANciclovir 450 milliGRAM(s) Oral daily    PRN Inpatient Medications  dextrose Oral Gel 15 Gram(s) Oral once PRN  guaifenesin/dextromethorphan Oral Liquid 10 milliLiter(s) Oral every 6 hours PRN  ondansetron Injectable 4 milliGRAM(s) IV Push every 6 hours PRN  traMADol 25 milliGRAM(s) Oral every 6 hours PRN  traMADol 50 milliGRAM(s) Oral every 8 hours PRN      REVIEW OF SYSTEMS  --------------------------------------------------------------------------------  All other systems were reviewed and are negative, except as noted.    VITALS/PHYSICAL EXAM  --------------------------------------------------------------------------------  T(C): 36.8 (05-23-25 @ 12:48), Max: 37.3 (05-23-25 @ 01:02)  HR: 89 (05-23-25 @ 12:48) (89 - 105)  BP: 170/97 (05-23-25 @ 12:48) (154/84 - 170/97)  RR: 20 (05-23-25 @ 12:48) (18 - 20)  SpO2: 98% (05-23-25 @ 12:48) (94% - 98%)  Wt(kg): --        05-22-25 @ 07:01  -  05-23-25 @ 07:00  --------------------------------------------------------  IN: 550 mL / OUT: 3630 mL / NET: -3080 mL    05-23-25 @ 07:01  -  05-23-25 @ 12:52  --------------------------------------------------------  IN: 0 mL / OUT: 605 mL / NET: -605 mL        Physical Exam:  	Gen: NAD  	HEENT: Anicteric  	Pulm: CTA B/L  	CV: S1S2+  	Abd: Soft, +BS           Transplant site: +staples, +drains  	Ext: No LE edema B/L  	Neuro: Awake          :Roper cath+ with clear urine  	Skin: Warm and dry      LABS/STUDIES  --------------------------------------------------------------------------------              14.3   19.04 >-----------<  373      [05-23-25 @ 07:15]              44.1     137  |  104  |  20  ----------------------------<  149      [05-23-25 @ 07:14]  4.4   |  16  |  1.00        Ca     8.9     [05-23-25 @ 07:14]      Mg     2.7     [05-23-25 @ 07:14]      Phos  2.5     [05-23-25 @ 07:14]    TPro  7.3  /  Alb  3.8  /  TBili  0.4  /  DBili  x   /  AST  18  /  ALT  17  /  AlkPhos  109  [05-23-25 @ 07:14]          Creatinine Trend:  SCr 1.00 [05-23 @ 07:14]  SCr 1.11 [05-22 @ 07:01]  SCr 1.99 [05-21 @ 07:24]  SCr 2.35 [05-21 @ 00:58]  SCr 3.51 [05-20 @ 19:38]    Urinalysis - [05-23-25 @ 07:14]      Color  / Appearance  / SG  / pH       Gluc 149 / Ketone   / Bili  / Urobili        Blood  / Protein  / Leuk Est  / Nitrite       RBC  / WBC  / Hyaline  / Gran  / Sq Epi  / Non Sq Epi  / Bacteria       HBsAb <3.3      [05-20-25 @ 08:30]  HBsAg Nonreact      [05-20-25 @ 08:30]  HBcAb Nonreact      [05-20-25 @ 08:30]  HCV 0.05, Nonreact      [05-20-25 @ 08:30]  HIV Nonreact      [05-20-25 @ 08:30]      TacrolimusTacrolimus (), Serum: 6.5 ng/mL (05-23 @ 07:15)  Tacrolimus (), Serum: 4.0 ng/mL (05-22 @ 07:03)    Cyclosporine  Sirolimus  Mycophenolate  BK PCR  CMV PCR  Parvo PCR  EBV PCR exercises, neuromuscular re-education  · Plan of Care Expires: 10/08/20  · Plan of Care Reviewed with: patient    Subjective     Pain/Comfort:  · Pain Rating 1: (R knee discomfort during OOB mobility)  · Location - Side 1: Right  · Location - Orientation 1: generalized  · Location 1: leg  · Pain Addressed 1: Reposition, Distraction  · Pain Addressed 2: Cessation of Activity, Reposition, Nurse notified    Objective:     Communicated with: RN prior to session.  Patient found HOB elevated with pulse ox (continuous), telemetry, nephrostomy upon OT entry to room.    General Precautions: Standard, contact, fall   Orthopedic Precautions:LLE weight bearing as tolerated   Braces: (L darco)     Occupational Performance:     Bed Mobility:    · Patient completed Rolling/Turning to Right with minimum assistance  · Patient completed Scooting/Bridging with minimum assistance  · Patient completed Supine to Sit with minimum assistance     Functional Mobility/Transfers:  · Patient completed Sit <> Stand Transfer with maximal assistance x2   with  hand-held assist   · Patient completed Bed <> Chair Transfer using Stand Pivot technique with maximal assistance x2 with hand-held assist  · Functional Mobility: Pt completed stand pivot to chair with max (A)X2. Pt required x2 attempts as pt unable to bear weight through LLE despite cueing. Pt with poor insight, limited motivated, and noted self limiting behavior during standing today.     Activities of Daily Living:  · Grooming: setup (A) with toothbrush and tooth paste with pt up in medichair  · Upper Body Dressing: moderate assistance adjusting gown fit at EOB   · Lower Body Dressing: total assistance donning darco shoe and  socks      [facility] 6 Click ADL: 14    Treatment & Education:  Pt educated on role of occupational therapy, POC, and safety during ADLs and functional mobility. Pt and OT discussed importance of safe, continued mobility to optimize daily living skills. Pt verbalized  understanding. Pt completed the following during session: bed mobility, UB/LB dressing, sit<Stand t/f trials, setup in medichair, setup with additional grooming items provided. White board updated during session. Pt given instruction to call for medical staff/nurse for assistance.       Patient left up in chair with all lines intact, call button in reach and RN notifiedEducation:      GOALS:   Multidisciplinary Problems     Occupational Therapy Goals        Problem: Occupational Therapy Goal    Goal Priority Disciplines Outcome Interventions   Occupational Therapy Goal     OT, PT/OT Ongoing, Progressing    Description: Goals to be met by: 10/18/20    Patient will increase functional independence with ADLs by performing:    Feeding with Set-up Assistance.  Grooming while EOB with Minimal Assistance. Goal met  Toileting from bedside commode with Moderate Assistance for hygiene and clothing management.   Supine to sit with Moderate Assistance to increased bed mobility independence. - met 10/2  Stand pivot transfers with Moderate Assistance and maintaining weight-bearing precaution(s) to reach bedside chair.  Toilet transfer to bedside commode with Moderate Assistance and maintaining weight-bearing precaution(s).  Upper extremity exercise program x15 reps per handout, with supervision to improve BUE function to increase independence in daily occupations.                     Time Tracking:     OT Date of Treatment: 10/05/20  OT Start Time: 1029  OT Stop Time: 1057  OT Total Time (min): 28 min    Billable Minutes:Self Care/Home Management 14 min  Therapeutic Activity 14 min    No Watkins OT  10/5/2020

## 2025-05-23 NOTE — PHARMACY EDUCATION NOTE - EDUCATION SUMMARY
Discharge immunosuppressant medications and prophylatic anti-infective agents reviewed with the patient. Outpatient medication schedule was discussed in detail including: medication name, indication, dose, administration times, treatment duration, side effects, drug interactions, and special instructions. Mycophenolate REMS discussed. Patient questions and concerns were answered and addressed. Patient demonstrated understanding. Stent education was provided to patient.

## 2025-05-23 NOTE — PROGRESS NOTE ADULT - SUBJECTIVE AND OBJECTIVE BOX
Transplant Surgery - Multidisciplinary Rounds  --------------------------------------------------------------   Tx Date:  5/20/2025 LDRT       POD#3    Patient seen and examined with Surgeon Dr. Carmichael, Nephrologist Dr. SOFY Kinney, ACP Romario/Angelica, Pharmacist Ethel, and unit RN. Disciplines not in attendance will be notified of the plan     HPI: 55 year old male with PMH HTN, HLD, DMT2 (on Mounjaro and Farxiga, managed by PCP, reports most recent HGA1C around 6%, reports lantus and humalog insulin were d/c'ed and mounjaro was added), childhood asthma (no intubations/no longer uses inhaler), HOLLAND (noncompliant with CPAP), hypothyroidism, depression, CVA (around 2018/2019 - residual parasthesias s/p loop recorder placement and Eliquis BID) with recurrent TIAs (2020), paratracheal nodule s/p removal (benign, 2 years ago at [facility], no difficulty swallowing, unsure of surgeon's name) and CKD (eGFR 16, not currently on dialysis) presents for scheduled open living kidney transplant ot the right side, possible left with Dr. Webber.    Patient is now s/p LDRT 2a/1v/1u+stent under Simulect induction on 5/20/2025.       Interval Events:          Immunosupression:  Induction: Simulect  Maintenance: FK by level/MMF/SST  Ongoing monitoring for signs of rejection     Potential Discharge date: TBD  Education:  Medications  Plan of care:  See Below        TX DATA      Review of systems  Gen: No weight changes, fatigue, fevers/chills, weakness  Skin: No rashes  Head/Eyes/Ears/Mouth: No headache; Normal hearing; Normal vision w/o blurriness; No sinus pain/discomfort, sore throat  Respiratory: No dyspnea, cough, wheezing, hemoptysis  CV: No chest pain, PND, orthopnea  GI: Mild abdominal pain at surgical incision site; denies diarrhea, constipation, nausea, vomiting, melena, hematochezia  : No increased frequency, dysuria, hematuria, nocturia  MSK: No joint pain/swelling; no back pain; no edema  Neuro: No dizziness/lightheadedness, weakness, seizures, numbness, tingling  Heme: No easy bruising or bleeding  Endo: No heat/cold intolerance  Psych: No significant nervousness, anxiety, stress, depression  All other systems were reviewed and are negative, except as noted.      PHYSICAL EXAM:  Constitutional: Well developed / well nourished  Eyes: Anicteric, PERRLA  ENMT: nc/at  Neck: supple  Respiratory: CTA B/L  Cardiovascular: RRR  Gastrointestinal: Soft, non distended, mild tenderness at the incision site; incision c/d/i; QUAN x1 serosang  Genitourinary: Urinary catheter in place  Extremities: SCD's in place and working bilaterally  Vascular: +Dopplerable dp pulses bilaterally, feet warm b/l   Neurological: A&O x3  Skin: no rashes, ulcerations or lesions;  Musculoskeletal: Moving all extremities  Psychiatric: Responsive     Transplant Surgery - Multidisciplinary Rounds  --------------------------------------------------------------   Tx Date:  5/20/2025 LDRT       POD#3    Patient seen and examined with Surgeon Dr. Webber, Nephrologist Dr. SOFY Kinney, BOO Dominguez, Pharmacist Ethel, and unit RN. Disciplines not in attendance will be notified of the plan     HPI: 55 year old male with PMH HTN, HLD, DMT2 (on Mounjaro and Farxiga, managed by PCP, reports most recent HGA1C around 6%, reports lantus and humalog insulin were d/c'ed and mounjaro was added), childhood asthma (no intubations/no longer uses inhaler), HOLLAND (noncompliant with CPAP), hypothyroidism, depression, CVA (around 2018/2019 - residual parasthesias s/p loop recorder placement and Eliquis BID) with recurrent TIAs (2020), paratracheal nodule s/p removal (benign, 2 years ago at [facility], no difficulty swallowing, unsure of surgeon's name) and CKD (eGFR 16, not currently on dialysis) presents for scheduled open living kidney transplant ot the right side, possible left with Dr. Webber.    Patient is now s/p LDRT 2a/1v/1u+stent under Simulect induction on 5/20/2025.       Interval Events:  - Afebrile, VSS  - UO: 3 L      Immunosupression:  Induction: Simulect  Maintenance: FK by level/Myfortic 720 BID/SST  Ongoing monitoring for signs of rejection     Potential Discharge date: TBD  Education:  Medications  Plan of care:  See Below        MEDICATIONS  (STANDING):  apixaban 2.5 milliGRAM(s) Oral two times a day  aspirin enteric coated 81 milliGRAM(s) Oral daily  atorvastatin 80 milliGRAM(s) Oral at bedtime  buPROPion XL (24-Hour) . 450 milliGRAM(s) Oral daily  chlorhexidine 2% Cloths 1 Application(s) Topical daily  dextrose 5%. 1000 milliLiter(s) (50 mL/Hr) IV Continuous <Continuous>  dextrose 5%. 1000 milliLiter(s) (100 mL/Hr) IV Continuous <Continuous>  dextrose 50% Injectable 25 Gram(s) IV Push once  dextrose 50% Injectable 12.5 Gram(s) IV Push once  dextrose 50% Injectable 25 Gram(s) IV Push once  ezetimibe 10 milliGRAM(s) Oral daily  glucagon  Injectable 1 milliGRAM(s) IntraMuscular once  insulin lispro (ADMELOG) corrective regimen sliding scale   SubCutaneous three times a day before meals  insulin lispro (ADMELOG) corrective regimen sliding scale   SubCutaneous at bedtime  levothyroxine 50 MICROGram(s) Oral daily  mycophenolic acid  milliGRAM(s) Oral <User Schedule>  nystatin    Suspension 353606 Unit(s) Swish and Swallow four times a day  pantoprazole    Tablet 40 milliGRAM(s) Oral two times a day  polyethylene glycol 3350 17 Gram(s) Oral daily  senna 2 Tablet(s) Oral at bedtime  sertraline 50 milliGRAM(s) Oral daily  tacrolimus ER Tablet (ENVARSUS XR) 5 milliGRAM(s) Oral <User Schedule>  trimethoprim   80 mG/sulfamethoxazole 400 mG 1 Tablet(s) Oral daily  valGANciclovir 450 milliGRAM(s) Oral daily    MEDICATIONS  (PRN):  dextrose Oral Gel 15 Gram(s) Oral once PRN Blood Glucose LESS THAN 70 milliGRAM(s)/deciliter  guaifenesin/dextromethorphan Oral Liquid 10 milliLiter(s) Oral every 6 hours PRN Cough  ondansetron Injectable 4 milliGRAM(s) IV Push every 6 hours PRN Nausea and/or Vomiting  traMADol 25 milliGRAM(s) Oral every 6 hours PRN Moderate Pain (4 - 6)  traMADol 50 milliGRAM(s) Oral every 8 hours PRN Severe Pain (7 - 10)      PAST MEDICAL & SURGICAL HISTORY:  Stroke      HTN (hypertension)      Diabetes      Hypothyroidism      Hyperlipidemia      Chronic kidney disease, unspecified      History of TIAs      Childhood asthma      HOLLAND (obstructive sleep apnea)      Numbness and tingling      Tracheal nodule      History of loop recorder      History of coronary angiogram          Vital Signs Last 24 Hrs  T(C): 36.8 (23 May 2025 17:00), Max: 37.3 (23 May 2025 01:02)  T(F): 98.2 (23 May 2025 17:00), Max: 99.1 (23 May 2025 01:02)  HR: 100 (23 May 2025 17:00) (89 - 108)  BP: 159/94 (23 May 2025 17:00) (148/91 - 170/97)  BP(mean): 114 (23 May 2025 15:12) (114 - 123)  RR: 20 (23 May 2025 17:00) (18 - 20)  SpO2: 98% (23 May 2025 17:00) (94% - 98%)    Parameters below as of 23 May 2025 17:00  Patient On (Oxygen Delivery Method): room air        I&O's Summary    22 May 2025 07:01  -  23 May 2025 07:00  --------------------------------------------------------  IN: 550 mL / OUT: 3630 mL / NET: -3080 mL    23 May 2025 07:01  -  23 May 2025 18:28  --------------------------------------------------------  IN: 550 mL / OUT: 1250 mL / NET: -700 mL                              14.3   19.04 )-----------( 373      ( 23 May 2025 07:15 )             44.1     05-23    137  |  104  |  20  ----------------------------<  149[H]  4.4   |  16[L]  |  1.00    Ca    8.9      23 May 2025 07:14  Phos  2.5     05-23  Mg     2.7     05-23    TPro  7.3  /  Alb  3.8  /  TBili  0.4  /  DBili  x   /  AST  18  /  ALT  17  /  AlkPhos  109  05-23    Tacrolimus (), Serum: 6.5 ng/mL (05-23 @ 07:15)                Review of systems  Gen: No weight changes, fatigue, fevers/chills, weakness  Skin: No rashes  Head/Eyes/Ears/Mouth: No headache; Normal hearing; Normal vision w/o blurriness; No sinus pain/discomfort, sore throat  Respiratory: No dyspnea, cough, wheezing, hemoptysis  CV: No chest pain, PND, orthopnea  GI: Mild abdominal pain at surgical incision site; Endorses Heartburn, denies diarrhea, constipation, nausea, vomiting, melena, hematochezia  : No increased frequency, dysuria, hematuria, nocturia  MSK: No joint pain/swelling; no back pain; no edema  Neuro: No dizziness/lightheadedness, weakness, seizures, numbness, tingling  Heme: No easy bruising or bleeding  Endo: No heat/cold intolerance  Psych: No significant nervousness, anxiety, stress, depression  All other systems were reviewed and are negative, except as noted.      PHYSICAL EXAM:  Constitutional: Well developed / well nourished  Eyes: Anicteric, PERRLA  ENMT: nc/at  Neck: supple  Respiratory: CTA B/L  Cardiovascular: RRR  Gastrointestinal: Soft, non distended, mild tenderness at the incision site; incision c/d/i; QUAN x1 serosang  Genitourinary: Urinary catheter in place  Extremities: SCD's in place and working bilaterally  Vascular: +Dopplerable dp pulses bilaterally, feet warm b/l   Neurological: A&O x3  Skin: no rashes, ulcerations or lesions;  Musculoskeletal: Moving all extremities  Psychiatric: Responsive

## 2025-05-23 NOTE — PROGRESS NOTE ADULT - ASSESSMENT
55 year old male with PMH HTN, HLD, DMT2 (on Mounjaro and Farxiga, managed by PCP, reports most recent HGA1C around 6%, reports lantus and humalog insulin were d/c'ed and mounjaro was added), childhood asthma (no intubations/no longer uses inhaler), HOLLAND (noncompliant with CPAP), hypothyroidism, depression, CVA (around 2018/2019 - residual parasthesias s/p loop recorder placement and Eliquis BID) with recurrent TIAs (2020), paratracheal nodule s/p removal (benign, 2 years ago at [facility], no difficulty swallowing, unsure of surgeon's name) and CKD (eGFR 16, not currently on dialysis) presents for scheduled open living kidney transplant ot the right side, possible left with Dr. Webber.    Patient is now s/p LDRT 2a/1v/1u+stent under Simulect induction on 5/20/2025.     [] POD#3 LDRT  - trend labs, vital signs  - Strict I's&O's, JPx1, gibson,   - Renal Doppler: patent, homogenous flow, no collection, no RONAN.   - pain control  - bowel regimen  - reg diet  - SCDs/IS/ PT      [] IMMUNO  - Simulect induction (next dose due POD#4)  - Env by level , MMF 1g BID, SST    [] DVT PPX  - SCDs  - SQH to be discussed when to begin daily  - resumed home Eliquis  55 year old male with PMH HTN, HLD, DMT2 (on Mounjaro and Farxiga, managed by PCP, reports most recent HGA1C around 6%, reports lantus and humalog insulin were d/c'ed and mounjaro was added), childhood asthma (no intubations/no longer uses inhaler), HOLLAND (noncompliant with CPAP), hypothyroidism, depression, CVA (around 2018/2019 - residual parasthesias s/p loop recorder placement and Eliquis BID) with recurrent TIAs (2020), paratracheal nodule s/p removal (benign, 2 years ago at [facility], no difficulty swallowing, unsure of surgeon's name) and CKD (eGFR 16, not currently on dialysis) presents for scheduled open living kidney transplant ot the right side, possible left with Dr. Webber.    Patient is now s/p LDRT 2a/1v/1u+stent under Simulect induction on 5/20/2025.     [] POD#3 LDRT  - Strict I's&O's, Greta, arthur,   - Renal Doppler: patent, homogenous flow, no collection, no RONAN.   - pain control  - bowel regimen -> Protonix BID  - reg diet  - SCDs/IS/ PT      [] IMMUNO  - Simulect induction (next dose due POD#4)  - Env by level , Myfortic 720 BID, SST    [] DVT PPX  - SCDs  - Eliquis/ASA

## 2025-05-23 NOTE — PROGRESS NOTE ADULT - ASSESSMENT
55M with PMH of HTN, HLD, DMT2 (on Mounjaro and Farxiga, managed by PCP, previously was on lantus and humalog insulin were d/c'ed as mounjaro was added), childhood asthma (no intubations/no longer uses inhaler), HOLLAND (noncompliant with CPAP), hypothyroidism, depression, CVA (around 2018/2019 - residual parasthesias s/p loop recorder placement and Eliquis BID) with recurrent TIAs (2020), paratracheal nodule s/p removal (benign, 2 years ago at [facility], no difficulty swallowing, unsure of surgeon's name) and CKD (eGFR 16, not currently on dialysis) presents for and now s/p pre-emptive LDRT 2a/1v/1u+stent under Simulect induction on 5/20/2025.        S/p LDRT (5/20)  -Renal fx improving with significant UOP. Cr now 1.1  -Patient's Nephrologist is Dr. Kristen Brenner.    IS  Simulect induction  Tacro by level  MMF 1gm BID   SST (short taper with plan to take off steroids)  Ppx: Bactrim, Valcyte, Nystatin      DM  Insulin as needed      HTN  Resume home meds as needed, s/p nifedipine x 1 (5/21)      H/o CVA -cw low dose yas Morillo  Nephrology Fellow  Feel free to contact me on TEAMS  After 5 pm and on weekends please contact the on-call Fellow.

## 2025-05-24 LAB
ALBUMIN SERPL ELPH-MCNC: 3.4 G/DL — SIGNIFICANT CHANGE UP (ref 3.3–5)
ALP SERPL-CCNC: 94 U/L — SIGNIFICANT CHANGE UP (ref 40–120)
ALT FLD-CCNC: 14 U/L — SIGNIFICANT CHANGE UP (ref 10–45)
ANION GAP SERPL CALC-SCNC: 12 MMOL/L — SIGNIFICANT CHANGE UP (ref 5–17)
AST SERPL-CCNC: 15 U/L — SIGNIFICANT CHANGE UP (ref 10–40)
BASOPHILS # BLD AUTO: 0.02 K/UL — SIGNIFICANT CHANGE UP (ref 0–0.2)
BASOPHILS NFR BLD AUTO: 0.1 % — SIGNIFICANT CHANGE UP (ref 0–2)
BILIRUB SERPL-MCNC: 0.4 MG/DL — SIGNIFICANT CHANGE UP (ref 0.2–1.2)
BUN SERPL-MCNC: 22 MG/DL — SIGNIFICANT CHANGE UP (ref 7–23)
CALCIUM SERPL-MCNC: 9 MG/DL — SIGNIFICANT CHANGE UP (ref 8.4–10.5)
CHLORIDE SERPL-SCNC: 109 MMOL/L — HIGH (ref 96–108)
CO2 SERPL-SCNC: 17 MMOL/L — LOW (ref 22–31)
CREAT SERPL-MCNC: 0.98 MG/DL — SIGNIFICANT CHANGE UP (ref 0.5–1.3)
EGFR: 92 ML/MIN/1.73M2 — SIGNIFICANT CHANGE UP
EGFR: 92 ML/MIN/1.73M2 — SIGNIFICANT CHANGE UP
EOSINOPHIL # BLD AUTO: 0 K/UL — SIGNIFICANT CHANGE UP (ref 0–0.5)
EOSINOPHIL NFR BLD AUTO: 0 % — SIGNIFICANT CHANGE UP (ref 0–6)
GLUCOSE BLDC GLUCOMTR-MCNC: 171 MG/DL — HIGH (ref 70–99)
GLUCOSE BLDC GLUCOMTR-MCNC: 174 MG/DL — HIGH (ref 70–99)
GLUCOSE BLDC GLUCOMTR-MCNC: 201 MG/DL — HIGH (ref 70–99)
GLUCOSE BLDC GLUCOMTR-MCNC: 243 MG/DL — HIGH (ref 70–99)
GLUCOSE SERPL-MCNC: 152 MG/DL — HIGH (ref 70–99)
HCT VFR BLD CALC: 42.2 % — SIGNIFICANT CHANGE UP (ref 39–50)
HGB BLD-MCNC: 14.3 G/DL — SIGNIFICANT CHANGE UP (ref 13–17)
IMM GRANULOCYTES NFR BLD AUTO: 0.5 % — SIGNIFICANT CHANGE UP (ref 0–0.9)
LYMPHOCYTES # BLD AUTO: 1.7 K/UL — SIGNIFICANT CHANGE UP (ref 1–3.3)
LYMPHOCYTES # BLD AUTO: 11.2 % — LOW (ref 13–44)
MAGNESIUM SERPL-MCNC: 2.4 MG/DL — SIGNIFICANT CHANGE UP (ref 1.6–2.6)
MCHC RBC-ENTMCNC: 29.1 PG — SIGNIFICANT CHANGE UP (ref 27–34)
MCHC RBC-ENTMCNC: 33.9 G/DL — SIGNIFICANT CHANGE UP (ref 32–36)
MCV RBC AUTO: 85.9 FL — SIGNIFICANT CHANGE UP (ref 80–100)
MONOCYTES # BLD AUTO: 1.26 K/UL — HIGH (ref 0–0.9)
MONOCYTES NFR BLD AUTO: 8.3 % — SIGNIFICANT CHANGE UP (ref 2–14)
NEUTROPHILS # BLD AUTO: 12.09 K/UL — HIGH (ref 1.8–7.4)
NEUTROPHILS NFR BLD AUTO: 79.9 % — HIGH (ref 43–77)
NRBC BLD AUTO-RTO: 0 /100 WBCS — SIGNIFICANT CHANGE UP (ref 0–0)
PHOSPHATE SERPL-MCNC: 2.1 MG/DL — LOW (ref 2.5–4.5)
PLATELET # BLD AUTO: 368 K/UL — SIGNIFICANT CHANGE UP (ref 150–400)
POTASSIUM SERPL-MCNC: 4.5 MMOL/L — SIGNIFICANT CHANGE UP (ref 3.5–5.3)
POTASSIUM SERPL-SCNC: 4.5 MMOL/L — SIGNIFICANT CHANGE UP (ref 3.5–5.3)
PROT SERPL-MCNC: 6.7 G/DL — SIGNIFICANT CHANGE UP (ref 6–8.3)
RBC # BLD: 4.91 M/UL — SIGNIFICANT CHANGE UP (ref 4.2–5.8)
RBC # FLD: 14.8 % — HIGH (ref 10.3–14.5)
SODIUM SERPL-SCNC: 138 MMOL/L — SIGNIFICANT CHANGE UP (ref 135–145)
TACROLIMUS SERPL-MCNC: 5.7 NG/ML — SIGNIFICANT CHANGE UP
WBC # BLD: 15.14 K/UL — HIGH (ref 3.8–10.5)
WBC # FLD AUTO: 15.14 K/UL — HIGH (ref 3.8–10.5)

## 2025-05-24 PROCEDURE — 99232 SBSQ HOSP IP/OBS MODERATE 35: CPT

## 2025-05-24 RX ORDER — SIMETHICONE 80 MG
80 TABLET,CHEWABLE ORAL ONCE
Refills: 0 | Status: COMPLETED | OUTPATIENT
Start: 2025-05-24 | End: 2025-05-24

## 2025-05-24 RX ORDER — CARVEDILOL 3.12 MG/1
6.25 TABLET, FILM COATED ORAL EVERY 12 HOURS
Refills: 0 | Status: DISCONTINUED | OUTPATIENT
Start: 2025-05-24 | End: 2025-05-25

## 2025-05-24 RX ORDER — PREDNISONE 20 MG/1
20 TABLET ORAL ONCE
Refills: 0 | Status: COMPLETED | OUTPATIENT
Start: 2025-05-25 | End: 2025-05-25

## 2025-05-24 RX ORDER — PREDNISONE 20 MG/1
TABLET ORAL
Refills: 0 | Status: COMPLETED | OUTPATIENT
Start: 2025-05-24 | End: 2025-05-26

## 2025-05-24 RX ORDER — MELATONIN 5 MG
3 TABLET ORAL AT BEDTIME
Refills: 0 | Status: DISCONTINUED | OUTPATIENT
Start: 2025-05-24 | End: 2025-05-25

## 2025-05-24 RX ORDER — LABETALOL HYDROCHLORIDE 200 MG/1
10 TABLET, FILM COATED ORAL ONCE
Refills: 0 | Status: COMPLETED | OUTPATIENT
Start: 2025-05-24 | End: 2025-05-24

## 2025-05-24 RX ORDER — PREDNISONE 20 MG/1
40 TABLET ORAL ONCE
Refills: 0 | Status: COMPLETED | OUTPATIENT
Start: 2025-05-24 | End: 2025-05-24

## 2025-05-24 RX ORDER — TACROLIMUS 0.5 MG/1
7 CAPSULE ORAL
Refills: 0 | Status: DISCONTINUED | OUTPATIENT
Start: 2025-05-25 | End: 2025-05-25

## 2025-05-24 RX ORDER — TACROLIMUS 0.5 MG/1
2 CAPSULE ORAL ONCE
Refills: 0 | Status: COMPLETED | OUTPATIENT
Start: 2025-05-24 | End: 2025-05-24

## 2025-05-24 RX ADMIN — Medication 81 MILLIGRAM(S): at 12:50

## 2025-05-24 RX ADMIN — Medication 10 MILLIGRAM(S): at 03:35

## 2025-05-24 RX ADMIN — INSULIN LISPRO 2: 100 INJECTION, SOLUTION INTRAVENOUS; SUBCUTANEOUS at 17:47

## 2025-05-24 RX ADMIN — Medication 500000 UNIT(S): at 06:03

## 2025-05-24 RX ADMIN — INSULIN LISPRO 1: 100 INJECTION, SOLUTION INTRAVENOUS; SUBCUTANEOUS at 09:44

## 2025-05-24 RX ADMIN — BUPROPION HYDROBROMIDE 450 MILLIGRAM(S): 522 TABLET, EXTENDED RELEASE ORAL at 08:09

## 2025-05-24 RX ADMIN — MYCOPHENOLIC ACID 720 MILLIGRAM(S): 360 TABLET, DELAYED RELEASE ORAL at 08:09

## 2025-05-24 RX ADMIN — Medication 50 MICROGRAM(S): at 05:22

## 2025-05-24 RX ADMIN — Medication 500000 UNIT(S): at 12:50

## 2025-05-24 RX ADMIN — Medication 40 MILLIGRAM(S): at 17:48

## 2025-05-24 RX ADMIN — LABETALOL HYDROCHLORIDE 10 MILLIGRAM(S): 200 TABLET, FILM COATED ORAL at 00:56

## 2025-05-24 RX ADMIN — TACROLIMUS 5 MILLIGRAM(S): 0.5 CAPSULE ORAL at 08:09

## 2025-05-24 RX ADMIN — MYCOPHENOLIC ACID 720 MILLIGRAM(S): 360 TABLET, DELAYED RELEASE ORAL at 19:30

## 2025-05-24 RX ADMIN — Medication 1 TABLET(S): at 12:50

## 2025-05-24 RX ADMIN — Medication 80 MILLIGRAM(S): at 00:55

## 2025-05-24 RX ADMIN — VALGANCICLOVIR 450 MILLIGRAM(S): 450 TABLET, FILM COATED ORAL at 12:51

## 2025-05-24 RX ADMIN — BASILIXIMAB 100 MILLIGRAM(S): 20 INJECTION, POWDER, FOR SOLUTION INTRAVENOUS at 10:24

## 2025-05-24 RX ADMIN — Medication 3 MILLIGRAM(S): at 00:56

## 2025-05-24 RX ADMIN — PREDNISONE 40 MILLIGRAM(S): 20 TABLET ORAL at 10:25

## 2025-05-24 RX ADMIN — Medication 2 TABLET(S): at 21:10

## 2025-05-24 RX ADMIN — CARVEDILOL 6.25 MILLIGRAM(S): 3.12 TABLET, FILM COATED ORAL at 12:55

## 2025-05-24 RX ADMIN — POLYETHYLENE GLYCOL 3350 17 GRAM(S): 17 POWDER, FOR SOLUTION ORAL at 12:51

## 2025-05-24 RX ADMIN — Medication 3 MILLIGRAM(S): at 21:10

## 2025-05-24 RX ADMIN — EZETIMIBE 10 MILLIGRAM(S): 10 TABLET ORAL at 12:50

## 2025-05-24 RX ADMIN — Medication 500000 UNIT(S): at 17:48

## 2025-05-24 RX ADMIN — ATORVASTATIN CALCIUM 80 MILLIGRAM(S): 80 TABLET, FILM COATED ORAL at 21:10

## 2025-05-24 RX ADMIN — APIXABAN 2.5 MILLIGRAM(S): 2.5 TABLET, FILM COATED ORAL at 06:03

## 2025-05-24 RX ADMIN — INSULIN LISPRO 1: 100 INJECTION, SOLUTION INTRAVENOUS; SUBCUTANEOUS at 12:51

## 2025-05-24 RX ADMIN — TACROLIMUS 2 MILLIGRAM(S): 0.5 CAPSULE ORAL at 10:25

## 2025-05-24 RX ADMIN — SERTRALINE 50 MILLIGRAM(S): 100 TABLET, FILM COATED ORAL at 12:51

## 2025-05-24 RX ADMIN — Medication 40 MILLIGRAM(S): at 06:04

## 2025-05-24 RX ADMIN — APIXABAN 2.5 MILLIGRAM(S): 2.5 TABLET, FILM COATED ORAL at 17:48

## 2025-05-24 RX ADMIN — Medication 1 APPLICATION(S): at 12:59

## 2025-05-24 RX ADMIN — LABETALOL HYDROCHLORIDE 10 MILLIGRAM(S): 200 TABLET, FILM COATED ORAL at 22:32

## 2025-05-24 NOTE — PROGRESS NOTE ADULT - SUBJECTIVE AND OBJECTIVE BOX
Transplant Surgery - Multidisciplinary Rounds  --------------------------------------------------------------   Tx Date:  5/20/2025 LDRT       POD#4    Present:   Patient seen and examined with multidisciplinary Transplant team including  Surgeon: Dr. Webber, Fellow: Dr. Henry Nephrologist: Dr. LISETTE Kinney. ACP: Jona and unit RN during am rounds.  Disciplines not in attendance will be notified of the plan    HPI: 55 year old male with PMH HTN, HLD, DMT2 (on Mounjaro and Farxiga, managed by PCP, reports most recent HGA1C around 6%, reports lantus and humalog insulin were d/c'ed and mounjaro was added), childhood asthma (no intubations/no longer uses inhaler), HOLLAND (noncompliant with CPAP), hypothyroidism, depression, CVA (around 2018/2019 - residual parasthesias s/p loop recorder placement and Eliquis BID) with recurrent TIAs (2020), paratracheal nodule s/p removal (benign, 2 years ago at [facility], no difficulty swallowing, unsure of surgeon's name) and CKD (eGFR 16, not currently on dialysis) presents for scheduled open living kidney transplant ot the right side, possible left with Dr. Webber.    Patient is now s/p LDRT 2a/1v/1u+stent under Simulect induction on 5/20/2025.       Interval Events:  - Afebrile, VSS  - UO: 2.2 L  - PPI added for C/O heartburn   - Hydralazine, labetalol x1 each for elevated BP overnight      Immunosupression:  Induction: Simulect  Maintenance: FK by level/Myfortic 720 BID/SST  Ongoing monitoring for signs of rejection     Potential Discharge date: TBD  Education:  Medications  Plan of care:  See Below     MEDICATIONS  (STANDING):  apixaban 2.5 milliGRAM(s) Oral two times a day  aspirin enteric coated 81 milliGRAM(s) Oral daily  atorvastatin 80 milliGRAM(s) Oral at bedtime  buPROPion XL (24-Hour) . 450 milliGRAM(s) Oral daily  carvedilol 6.25 milliGRAM(s) Oral every 12 hours  chlorhexidine 2% Cloths 1 Application(s) Topical daily  dextrose 5%. 1000 milliLiter(s) (100 mL/Hr) IV Continuous <Continuous>  dextrose 5%. 1000 milliLiter(s) (50 mL/Hr) IV Continuous <Continuous>  dextrose 50% Injectable 25 Gram(s) IV Push once  dextrose 50% Injectable 12.5 Gram(s) IV Push once  dextrose 50% Injectable 25 Gram(s) IV Push once  ezetimibe 10 milliGRAM(s) Oral daily  glucagon  Injectable 1 milliGRAM(s) IntraMuscular once  insulin lispro (ADMELOG) corrective regimen sliding scale   SubCutaneous three times a day before meals  insulin lispro (ADMELOG) corrective regimen sliding scale   SubCutaneous at bedtime  levothyroxine 50 MICROGram(s) Oral daily  melatonin 3 milliGRAM(s) Oral at bedtime  mycophenolic acid  milliGRAM(s) Oral <User Schedule>  nystatin    Suspension 785723 Unit(s) Swish and Swallow four times a day  pantoprazole    Tablet 40 milliGRAM(s) Oral two times a day  polyethylene glycol 3350 17 Gram(s) Oral daily  predniSONE   Tablet   Oral   senna 2 Tablet(s) Oral at bedtime  sertraline 50 milliGRAM(s) Oral daily  trimethoprim   80 mG/sulfamethoxazole 400 mG 1 Tablet(s) Oral daily  valGANciclovir 450 milliGRAM(s) Oral daily    MEDICATIONS  (PRN):  dextrose Oral Gel 15 Gram(s) Oral once PRN Blood Glucose LESS THAN 70 milliGRAM(s)/deciliter  guaifenesin/dextromethorphan Oral Liquid 10 milliLiter(s) Oral every 6 hours PRN Cough  ondansetron Injectable 4 milliGRAM(s) IV Push every 6 hours PRN Nausea and/or Vomiting  traMADol 25 milliGRAM(s) Oral every 6 hours PRN Moderate Pain (4 - 6)  traMADol 50 milliGRAM(s) Oral every 8 hours PRN Severe Pain (7 - 10)      PAST MEDICAL & SURGICAL HISTORY:  Stroke      HTN (hypertension)      Diabetes      Hypothyroidism      Hyperlipidemia      Chronic kidney disease, unspecified      History of TIAs      Childhood asthma      HOLLAND (obstructive sleep apnea)      Numbness and tingling      Tracheal nodule      History of loop recorder      History of coronary angiogram          Vital Signs Last 24 Hrs  T(C): 36.3 (24 May 2025 17:40), Max: 37.2 (23 May 2025 22:11)  T(F): 97.4 (24 May 2025 17:40), Max: 99 (23 May 2025 22:11)  HR: 95 (24 May 2025 17:40) (84 - 110)  BP: 148/98 (24 May 2025 17:40) (145/95 - 170/97)  BP(mean): 115 (24 May 2025 08:50) (110 - 124)  RR: 18 (24 May 2025 17:40) (16 - 18)  SpO2: 97% (24 May 2025 17:40) (95% - 99%)    Parameters below as of 24 May 2025 17:40  Patient On (Oxygen Delivery Method): room air        I&O's Summary    23 May 2025 07:01  -  24 May 2025 07:00  --------------------------------------------------------  IN: 920 mL / OUT: 2400 mL / NET: -1480 mL    24 May 2025 07:01  -  24 May 2025 18:14  --------------------------------------------------------  IN: 480 mL / OUT: 655 mL / NET: -175 mL                              14.3   15.14 )-----------( 368      ( 24 May 2025 07:16 )             42.2     05-24    138  |  109[H]  |  22  ----------------------------<  152[H]  4.5   |  17[L]  |  0.98    Ca    9.0      24 May 2025 07:11  Phos  2.1     05-24  Mg     2.4     05-24    TPro  6.7  /  Alb  3.4  /  TBili  0.4  /  DBili  x   /  AST  15  /  ALT  14  /  AlkPhos  94  05-24    Tacrolimus (), Serum: 5.7 ng/mL (05-24 @ 07:13)                    Review of systems  Gen: No weight changes, fatigue, fevers/chills, weakness  Skin: No rashes  Head/Eyes/Ears/Mouth: No headache; Normal hearing; Normal vision w/o blurriness; No sinus pain/discomfort, sore throat  Respiratory: No dyspnea, cough, wheezing, hemoptysis  CV: No chest pain, PND, orthopnea  GI: Mild abdominal pain at surgical incision site; Endorses Heartburn, denies diarrhea, constipation, nausea, vomiting, melena, hematochezia  : No increased frequency, dysuria, hematuria, nocturia  MSK: No joint pain/swelling; no back pain; no edema  Neuro: No dizziness/lightheadedness, weakness, seizures, numbness, tingling  Heme: No easy bruising or bleeding  Endo: No heat/cold intolerance  Psych: No significant nervousness, anxiety, stress, depression  All other systems were reviewed and are negative, except as noted.      PHYSICAL EXAM:  Constitutional: Well developed / well nourished  Eyes: Anicteric, PERRLA  ENMT: nc/at  Neck: supple  Respiratory: CTA B/L  Cardiovascular: RRR  Gastrointestinal: Soft, non distended, mild tenderness at the incision site; incision c/d/i; QUAN x1 serosang  Genitourinary: Urinary catheter in place  Extremities: SCD's in place and working bilaterally  Vascular: +Dopplerable dp pulses bilaterally, feet warm b/l   Neurological: A&O x3  Skin: no rashes, ulcerations or lesions;  Musculoskeletal: Moving all extremities  Psychiatric: Responsive

## 2025-05-24 NOTE — PROGRESS NOTE ADULT - ASSESSMENT
55 year old male with PMH of HTN, HLD, DMT2 (on Mounjaro and Farxiga, managed by PCP, previously was on lantus and humalog insulin were d/c'ed as mounjaro was added), childhood asthma (no intubations/no longer uses inhaler), HOLLAND (noncompliant with CPAP), hypothyroidism, depression, CVA (around 2018/2019 - residual parasthesias s/p loop recorder placement and Eliquis BID) with recurrent TIAs (2020), paratracheal nodule s/p removal (benign, 2 years ago at [facility], no difficulty swallowing, unsure of surgeon's name) and CKD (eGFR 16, not currently on dialysis) presents for and now s/p pre-emptive LDRT 2a/1v/1u+stent under Simulect induction on 5/20/2025.    Patient's Nephrologist is Dr. Kristen Brenner.    1. s/p LDRT on 5/20/25 - Allograft function with excellent UOP and Cr trended down to 0.8   2.IS meds- Simulect induction. Tacro by level, MMF 1gm BID , SST (short taper with plan to take off steroids). Ppx: Bactrim, Valcyte, Nystatin  3.DM- Insulin as needed  4.HTN- BP above goal. start on Nifedipine 30 mg po daily   5.h/o CVA -cw low dose eliquis

## 2025-05-24 NOTE — PROGRESS NOTE ADULT - SUBJECTIVE AND OBJECTIVE BOX
[facility] DIVISION OF KIDNEY DISEASES AND HYPERTENSION -- FOLLOW UP NOTE  --------------------------------------------------------------------------------  Chief Complaint:    24 hour events/subjective:  Patient was seen and examined at bedside      PAST HISTORY  --------------------------------------------------------------------------------  No significant changes to PMH, PSH, FHx, SHx, unless otherwise noted    ALLERGIES & MEDICATIONS  --------------------------------------------------------------------------------  Allergies    No Known Allergies    Intolerances      Standing Inpatient Medications  apixaban 2.5 milliGRAM(s) Oral two times a day  aspirin enteric coated 81 milliGRAM(s) Oral daily  atorvastatin 80 milliGRAM(s) Oral at bedtime  basiliximab  IVPB 20 milliGRAM(s) IV Intermittent <User Schedule>  buPROPion XL (24-Hour) . 450 milliGRAM(s) Oral daily  chlorhexidine 2% Cloths 1 Application(s) Topical daily  dextrose 5%. 1000 milliLiter(s) IV Continuous <Continuous>  dextrose 5%. 1000 milliLiter(s) IV Continuous <Continuous>  dextrose 50% Injectable 25 Gram(s) IV Push once  dextrose 50% Injectable 12.5 Gram(s) IV Push once  dextrose 50% Injectable 25 Gram(s) IV Push once  ezetimibe 10 milliGRAM(s) Oral daily  glucagon  Injectable 1 milliGRAM(s) IntraMuscular once  insulin lispro (ADMELOG) corrective regimen sliding scale   SubCutaneous three times a day before meals  insulin lispro (ADMELOG) corrective regimen sliding scale   SubCutaneous at bedtime  levothyroxine 50 MICROGram(s) Oral daily  melatonin 3 milliGRAM(s) Oral at bedtime  mycophenolic acid  milliGRAM(s) Oral <User Schedule>  nystatin    Suspension 824101 Unit(s) Swish and Swallow four times a day  pantoprazole    Tablet 40 milliGRAM(s) Oral two times a day  polyethylene glycol 3350 17 Gram(s) Oral daily  senna 2 Tablet(s) Oral at bedtime  sertraline 50 milliGRAM(s) Oral daily  tacrolimus ER Tablet (ENVARSUS XR) 5 milliGRAM(s) Oral <User Schedule>  trimethoprim   80 mG/sulfamethoxazole 400 mG 1 Tablet(s) Oral daily  valGANciclovir 450 milliGRAM(s) Oral daily    PRN Inpatient Medications  dextrose Oral Gel 15 Gram(s) Oral once PRN  guaifenesin/dextromethorphan Oral Liquid 10 milliLiter(s) Oral every 6 hours PRN  ondansetron Injectable 4 milliGRAM(s) IV Push every 6 hours PRN  traMADol 25 milliGRAM(s) Oral every 6 hours PRN  traMADol 50 milliGRAM(s) Oral every 8 hours PRN      REVIEW OF SYSTEMS  --------------------------------------------------------------------------------  Gen: No fatigue, fevers/chills, weakness  Skin: No rashes  Head/Eyes/Ears/Mouth: No headache;No sore throat  Respiratory: No dyspnea, cough,   CV: No chest pain, PND, orthopnea  GI: No abdominal pain, diarrhea, constipation, nausea, vomiting  Transplant: No pain  : No increased frequency, dysuria, hematuria, nocturia  MSK: No joint pain/swelling; no back pain; no edema  Neuro: No dizziness/lightheadedness, weakness, seizures, numbness, tingling  Psych: No significant nervousness, anxiety, stress, depression    All other systems were reviewed and are negative, except as noted.    VITALS/PHYSICAL EXAM  --------------------------------------------------------------------------------  T(C): 37.1 (05-24-25 @ 05:22), Max: 37.2 (05-23-25 @ 22:11)  HR: 84 (05-24-25 @ 05:22) (84 - 110)  BP: 150/83 (05-24-25 @ 05:22) (148/91 - 170/97)  RR: 18 (05-24-25 @ 05:22) (16 - 20)  SpO2: 96% (05-24-25 @ 05:22) (95% - 98%)  Wt(kg): --        05-23-25 @ 07:01  -  05-24-25 @ 07:00  --------------------------------------------------------  IN: 920 mL / OUT: 2400 mL / NET: -1480 mL    05-24-25 @ 07:01  -  05-24-25 @ 08:37  --------------------------------------------------------  IN: 0 mL / OUT: 65 mL / NET: -65 mL      Physical Exam:  	Gen: NAD  	HEENT: PERRL, supple neck, clear oropharynx  	Pulm: CTA B/L  	CV: RRR, S1S2; no rub  	Back: No spinal or CVA tenderness; no sacral edema  	Abd: +BS, soft, nontender/nondistended                      Transplant: No tenderness, swelling  	: No suprapubic tenderness  	UE: Warm, FROM; no edema; no asterixis  	LE: Warm, FROM; no edema  	Neuro: No focal deficits  	Psych: Normal affect and mood  	Skin: Warm, without rashes      LABS/STUDIES  --------------------------------------------------------------------------------              14.3   15.14 >-----------<  368      [05-24-25 @ 07:16]              42.2     138  |  109  |  22  ----------------------------<  152      [05-24-25 @ 07:11]  4.5   |  17  |  0.98        Ca     9.0     [05-24-25 @ 07:11]      Mg     2.4     [05-24-25 @ 07:11]      Phos  2.1     [05-24-25 @ 07:11]    TPro  6.7  /  Alb  3.4  /  TBili  0.4  /  DBili  x   /  AST  15  /  ALT  14  /  AlkPhos  94  [05-24-25 @ 07:11]          Creatinine Trend:  SCr 0.98 [05-24 @ 07:11]  SCr 1.00 [05-23 @ 07:14]  SCr 1.11 [05-22 @ 07:01]  SCr 1.99 [05-21 @ 07:24]  SCr 2.35 [05-21 @ 00:58]    Tacrolimus (), Serum: 6.5 ng/mL (05-23 @ 07:15)  Tacrolimus (), Serum: 4.0 ng/mL (05-22 @ 07:03)            Urinalysis - [05-24-25 @ 07:11]      Color  / Appearance  / SG  / pH       Gluc 152 / Ketone   / Bili  / Urobili        Blood  / Protein  / Leuk Est  / Nitrite       RBC  / WBC  / Hyaline  / Gran  / Sq Epi  / Non Sq Epi  / Bacteria         HBsAb <3.3      [05-20-25 @ 08:30]  HBsAg Nonreact      [05-20-25 @ 08:30]  HBcAb Nonreact      [05-20-25 @ 08:30]  HCV 0.05, Nonreact      [05-20-25 @ 08:30]  HIV Nonreact      [05-20-25 @ 08:30]

## 2025-05-24 NOTE — PROGRESS NOTE ADULT - ASSESSMENT
55 year old male with PMH HTN, HLD, DMT2 (on Mounjaro and Farxiga, managed by PCP, reports most recent HGA1C around 6%, reports lantus and humalog insulin were d/c'ed and mounjaro was added), childhood asthma (no intubations/no longer uses inhaler), HOLLAND (noncompliant with CPAP), hypothyroidism, depression, CVA (around 2018/2019 - residual parasthesias s/p loop recorder placement and Eliquis BID) with recurrent TIAs (2020), paratracheal nodule s/p removal (benign, 2 years ago at [facility], no difficulty swallowing, unsure of surgeon's name) and CKD (eGFR 16, not currently on dialysis) presents for scheduled open living kidney transplant ot the right side, possible left with Dr. Webber.    Patient is now s/p LDRT 2a/1v/1u+stent under Simulect induction on 5/20/2025.     [] POD#4 LDRT  - Strict I's&O's, JPx1, arthur MT ed today passed TOV   - Renal Doppler: patent, homogenous flow, no collection, no RONNA.   - pain control  - bowel regimen -> Protonix BID  - reg diet  - SCDs/IS/ PT  - Coreg 6.25mg BID      [] IMMUNO  - Simulect induction    - Env by level , Myfortic 720 BID, Rapid steroid withdrawal plan for 40 today 20 miki and no further pred    [] DVT PPX  - SCDs  - Eliquis/ASA

## 2025-05-25 VITALS
SYSTOLIC BLOOD PRESSURE: 137 MMHG | TEMPERATURE: 98 F | OXYGEN SATURATION: 97 % | RESPIRATION RATE: 18 BRPM | DIASTOLIC BLOOD PRESSURE: 83 MMHG | HEART RATE: 92 BPM

## 2025-05-25 LAB
ALBUMIN SERPL ELPH-MCNC: 3.4 G/DL — SIGNIFICANT CHANGE UP (ref 3.3–5)
ALP SERPL-CCNC: 90 U/L — SIGNIFICANT CHANGE UP (ref 40–120)
ALT FLD-CCNC: 13 U/L — SIGNIFICANT CHANGE UP (ref 10–45)
ANION GAP SERPL CALC-SCNC: 16 MMOL/L — SIGNIFICANT CHANGE UP (ref 5–17)
AST SERPL-CCNC: 12 U/L — SIGNIFICANT CHANGE UP (ref 10–40)
BASOPHILS # BLD AUTO: 0.03 K/UL — SIGNIFICANT CHANGE UP (ref 0–0.2)
BASOPHILS NFR BLD AUTO: 0.2 % — SIGNIFICANT CHANGE UP (ref 0–2)
BILIRUB SERPL-MCNC: 0.5 MG/DL — SIGNIFICANT CHANGE UP (ref 0.2–1.2)
BUN SERPL-MCNC: 25 MG/DL — HIGH (ref 7–23)
CALCIUM SERPL-MCNC: 8.9 MG/DL — SIGNIFICANT CHANGE UP (ref 8.4–10.5)
CHLORIDE SERPL-SCNC: 108 MMOL/L — SIGNIFICANT CHANGE UP (ref 96–108)
CO2 SERPL-SCNC: 15 MMOL/L — LOW (ref 22–31)
CREAT SERPL-MCNC: 1.08 MG/DL — SIGNIFICANT CHANGE UP (ref 0.5–1.3)
EGFR: 82 ML/MIN/1.73M2 — SIGNIFICANT CHANGE UP
EGFR: 82 ML/MIN/1.73M2 — SIGNIFICANT CHANGE UP
EOSINOPHIL # BLD AUTO: 0.09 K/UL — SIGNIFICANT CHANGE UP (ref 0–0.5)
EOSINOPHIL NFR BLD AUTO: 0.6 % — SIGNIFICANT CHANGE UP (ref 0–6)
GLUCOSE BLDC GLUCOMTR-MCNC: 194 MG/DL — HIGH (ref 70–99)
GLUCOSE BLDC GLUCOMTR-MCNC: 250 MG/DL — HIGH (ref 70–99)
GLUCOSE SERPL-MCNC: 159 MG/DL — HIGH (ref 70–99)
HCT VFR BLD CALC: 42.2 % — SIGNIFICANT CHANGE UP (ref 39–50)
HGB BLD-MCNC: 13.7 G/DL — SIGNIFICANT CHANGE UP (ref 13–17)
IMM GRANULOCYTES NFR BLD AUTO: 0.5 % — SIGNIFICANT CHANGE UP (ref 0–0.9)
LYMPHOCYTES # BLD AUTO: 18.6 % — SIGNIFICANT CHANGE UP (ref 13–44)
LYMPHOCYTES # BLD AUTO: 2.59 K/UL — SIGNIFICANT CHANGE UP (ref 1–3.3)
MAGNESIUM SERPL-MCNC: 2.1 MG/DL — SIGNIFICANT CHANGE UP (ref 1.6–2.6)
MCHC RBC-ENTMCNC: 28.2 PG — SIGNIFICANT CHANGE UP (ref 27–34)
MCHC RBC-ENTMCNC: 32.5 G/DL — SIGNIFICANT CHANGE UP (ref 32–36)
MCV RBC AUTO: 86.8 FL — SIGNIFICANT CHANGE UP (ref 80–100)
MONOCYTES # BLD AUTO: 1.38 K/UL — HIGH (ref 0–0.9)
MONOCYTES NFR BLD AUTO: 9.9 % — SIGNIFICANT CHANGE UP (ref 2–14)
NEUTROPHILS # BLD AUTO: 9.75 K/UL — HIGH (ref 1.8–7.4)
NEUTROPHILS NFR BLD AUTO: 70.2 % — SIGNIFICANT CHANGE UP (ref 43–77)
NRBC BLD AUTO-RTO: 0 /100 WBCS — SIGNIFICANT CHANGE UP (ref 0–0)
PHOSPHATE SERPL-MCNC: 1.7 MG/DL — LOW (ref 2.5–4.5)
PLATELET # BLD AUTO: 403 K/UL — HIGH (ref 150–400)
POTASSIUM SERPL-MCNC: 4 MMOL/L — SIGNIFICANT CHANGE UP (ref 3.5–5.3)
POTASSIUM SERPL-SCNC: 4 MMOL/L — SIGNIFICANT CHANGE UP (ref 3.5–5.3)
PROT SERPL-MCNC: 6.4 G/DL — SIGNIFICANT CHANGE UP (ref 6–8.3)
RBC # BLD: 4.86 M/UL — SIGNIFICANT CHANGE UP (ref 4.2–5.8)
RBC # FLD: 14.8 % — HIGH (ref 10.3–14.5)
SODIUM SERPL-SCNC: 139 MMOL/L — SIGNIFICANT CHANGE UP (ref 135–145)
TACROLIMUS SERPL-MCNC: 10.4 NG/ML — SIGNIFICANT CHANGE UP
WBC # BLD: 13.91 K/UL — HIGH (ref 3.8–10.5)
WBC # FLD AUTO: 13.91 K/UL — HIGH (ref 3.8–10.5)

## 2025-05-25 PROCEDURE — C1889: CPT

## 2025-05-25 PROCEDURE — 71045 X-RAY EXAM CHEST 1 VIEW: CPT

## 2025-05-25 PROCEDURE — 87389 HIV-1 AG W/HIV-1&-2 AB AG IA: CPT

## 2025-05-25 PROCEDURE — 85610 PROTHROMBIN TIME: CPT

## 2025-05-25 PROCEDURE — 86850 RBC ANTIBODY SCREEN: CPT

## 2025-05-25 PROCEDURE — 80053 COMPREHEN METABOLIC PANEL: CPT

## 2025-05-25 PROCEDURE — 83036 HEMOGLOBIN GLYCOSYLATED A1C: CPT

## 2025-05-25 PROCEDURE — 82330 ASSAY OF CALCIUM: CPT

## 2025-05-25 PROCEDURE — 82435 ASSAY OF BLOOD CHLORIDE: CPT

## 2025-05-25 PROCEDURE — 97162 PT EVAL MOD COMPLEX 30 MIN: CPT

## 2025-05-25 PROCEDURE — 83735 ASSAY OF MAGNESIUM: CPT

## 2025-05-25 PROCEDURE — 86901 BLOOD TYPING SEROLOGIC RH(D): CPT

## 2025-05-25 PROCEDURE — 82947 ASSAY GLUCOSE BLOOD QUANT: CPT

## 2025-05-25 PROCEDURE — 99232 SBSQ HOSP IP/OBS MODERATE 35: CPT

## 2025-05-25 PROCEDURE — 80197 ASSAY OF TACROLIMUS: CPT

## 2025-05-25 PROCEDURE — C1769: CPT

## 2025-05-25 PROCEDURE — 84132 ASSAY OF SERUM POTASSIUM: CPT

## 2025-05-25 PROCEDURE — 82803 BLOOD GASES ANY COMBINATION: CPT

## 2025-05-25 PROCEDURE — 76776 US EXAM K TRANSPL W/DOPPLER: CPT

## 2025-05-25 PROCEDURE — 97116 GAIT TRAINING THERAPY: CPT

## 2025-05-25 PROCEDURE — 86704 HEP B CORE ANTIBODY TOTAL: CPT

## 2025-05-25 PROCEDURE — 83605 ASSAY OF LACTIC ACID: CPT

## 2025-05-25 PROCEDURE — 85025 COMPLETE CBC W/AUTO DIFF WBC: CPT

## 2025-05-25 PROCEDURE — 86900 BLOOD TYPING SEROLOGIC ABO: CPT

## 2025-05-25 PROCEDURE — 87340 HEPATITIS B SURFACE AG IA: CPT

## 2025-05-25 PROCEDURE — 97166 OT EVAL MOD COMPLEX 45 MIN: CPT

## 2025-05-25 PROCEDURE — 97110 THERAPEUTIC EXERCISES: CPT

## 2025-05-25 PROCEDURE — 93005 ELECTROCARDIOGRAM TRACING: CPT

## 2025-05-25 PROCEDURE — 86803 HEPATITIS C AB TEST: CPT

## 2025-05-25 PROCEDURE — 86706 HEP B SURFACE ANTIBODY: CPT

## 2025-05-25 PROCEDURE — 36415 COLL VENOUS BLD VENIPUNCTURE: CPT

## 2025-05-25 PROCEDURE — 85014 HEMATOCRIT: CPT

## 2025-05-25 PROCEDURE — 82962 GLUCOSE BLOOD TEST: CPT

## 2025-05-25 PROCEDURE — 84295 ASSAY OF SERUM SODIUM: CPT

## 2025-05-25 PROCEDURE — 87522 HEPATITIS C REVRS TRNSCRPJ: CPT

## 2025-05-25 PROCEDURE — 85730 THROMBOPLASTIN TIME PARTIAL: CPT

## 2025-05-25 PROCEDURE — C2617: CPT

## 2025-05-25 PROCEDURE — 85018 HEMOGLOBIN: CPT

## 2025-05-25 PROCEDURE — 84100 ASSAY OF PHOSPHORUS: CPT

## 2025-05-25 RX ORDER — TACROLIMUS 0.5 MG/1
5 CAPSULE ORAL
Qty: 0 | Refills: 0 | DISCHARGE
Start: 2025-05-25

## 2025-05-25 RX ORDER — LEVOTHYROXINE SODIUM 300 MCG
1 TABLET ORAL
Qty: 0 | Refills: 0 | DISCHARGE
Start: 2025-05-25

## 2025-05-25 RX ORDER — SODIUM BICARBONATE 1 MEQ/ML
650 SYRINGE (ML) INTRAVENOUS
Refills: 0 | DISCHARGE

## 2025-05-25 RX ORDER — CARVEDILOL 3.12 MG/1
6.25 TABLET, FILM COATED ORAL ONCE
Refills: 0 | Status: COMPLETED | OUTPATIENT
Start: 2025-05-25 | End: 2025-05-25

## 2025-05-25 RX ORDER — BUPROPION HYDROBROMIDE 522 MG/1
1 TABLET, EXTENDED RELEASE ORAL
Qty: 0 | Refills: 0 | DISCHARGE
Start: 2025-05-25

## 2025-05-25 RX ORDER — TACROLIMUS 0.5 MG/1
5 CAPSULE ORAL
Refills: 0 | Status: DISCONTINUED | OUTPATIENT
Start: 2025-05-26 | End: 2025-05-25

## 2025-05-25 RX ORDER — CARVEDILOL 3.12 MG/1
1 TABLET, FILM COATED ORAL
Qty: 180 | Refills: 0
Start: 2025-05-25 | End: 2025-08-22

## 2025-05-25 RX ORDER — SERTRALINE 100 MG/1
1 TABLET, FILM COATED ORAL
Refills: 0 | DISCHARGE

## 2025-05-25 RX ORDER — LABETALOL HYDROCHLORIDE 200 MG/1
1 TABLET, FILM COATED ORAL
Refills: 0 | DISCHARGE

## 2025-05-25 RX ORDER — APIXABAN 2.5 MG/1
1 TABLET, FILM COATED ORAL
Refills: 0 | DISCHARGE

## 2025-05-25 RX ORDER — CARVEDILOL 3.12 MG/1
12.5 TABLET, FILM COATED ORAL EVERY 12 HOURS
Refills: 0 | Status: DISCONTINUED | OUTPATIENT
Start: 2025-05-25 | End: 2025-05-25

## 2025-05-25 RX ORDER — SODIUM PHOSPHATE,DIBASIC DIHYD
15 POWDER (GRAM) MISCELLANEOUS ONCE
Refills: 0 | Status: COMPLETED | OUTPATIENT
Start: 2025-05-25 | End: 2025-05-25

## 2025-05-25 RX ORDER — SULFAMETHOXAZOLE/TRIMETHOPRIM 800-160 MG
1 TABLET ORAL
Qty: 0 | Refills: 0 | DISCHARGE
Start: 2025-05-25

## 2025-05-25 RX ORDER — TRAMADOL HYDROCHLORIDE 50 MG/1
1 TABLET, FILM COATED ORAL
Qty: 9 | Refills: 0
Start: 2025-05-25 | End: 2025-05-27

## 2025-05-25 RX ORDER — EZETIMIBE 10 MG/1
1 TABLET ORAL
Refills: 0 | DISCHARGE

## 2025-05-25 RX ORDER — GABAPENTIN 400 MG/1
1 CAPSULE ORAL
Refills: 0 | DISCHARGE

## 2025-05-25 RX ORDER — VALGANCICLOVIR 450 MG/1
1 TABLET, FILM COATED ORAL
Qty: 0 | Refills: 0 | DISCHARGE
Start: 2025-05-25

## 2025-05-25 RX ORDER — NIFEDIPINE 30 MG
1 TABLET, EXTENDED RELEASE 24 HR ORAL
Refills: 0 | DISCHARGE

## 2025-05-25 RX ORDER — CALCITRIOL 0.5 UG/1
1 CAPSULE, GELATIN COATED ORAL
Refills: 0 | DISCHARGE

## 2025-05-25 RX ORDER — SENNA 187 MG
2 TABLET ORAL
Qty: 0 | Refills: 0 | DISCHARGE
Start: 2025-05-25

## 2025-05-25 RX ORDER — APIXABAN 2.5 MG/1
1 TABLET, FILM COATED ORAL
Qty: 0 | Refills: 0 | DISCHARGE
Start: 2025-05-25

## 2025-05-25 RX ORDER — EZETIMIBE 10 MG/1
1 TABLET ORAL
Qty: 0 | Refills: 0 | DISCHARGE
Start: 2025-05-25

## 2025-05-25 RX ORDER — ATORVASTATIN CALCIUM 80 MG/1
1 TABLET, FILM COATED ORAL
Qty: 0 | Refills: 0 | DISCHARGE
Start: 2025-05-25

## 2025-05-25 RX ORDER — IPRATROPIUM BROMIDE AND ALBUTEROL SULFATE .5; 2.5 MG/3ML; MG/3ML
3 SOLUTION RESPIRATORY (INHALATION) EVERY 6 HOURS
Refills: 0 | Status: DISCONTINUED | OUTPATIENT
Start: 2025-05-25 | End: 2025-05-25

## 2025-05-25 RX ORDER — SERTRALINE 100 MG/1
1 TABLET, FILM COATED ORAL
Qty: 0 | Refills: 0 | DISCHARGE
Start: 2025-05-25

## 2025-05-25 RX ORDER — TORSEMIDE 10 MG
1 TABLET ORAL
Refills: 0 | DISCHARGE

## 2025-05-25 RX ORDER — DAPAGLIFLOZIN 5 MG/1
1 TABLET, FILM COATED ORAL
Refills: 0 | DISCHARGE

## 2025-05-25 RX ORDER — ASPIRIN 325 MG
1 TABLET ORAL
Qty: 0 | Refills: 0 | DISCHARGE
Start: 2025-05-25

## 2025-05-25 RX ORDER — POLYETHYLENE GLYCOL 3350 17 G/17G
17 POWDER, FOR SOLUTION ORAL
Qty: 0 | Refills: 0 | DISCHARGE
Start: 2025-05-25

## 2025-05-25 RX ORDER — MYCOPHENOLIC ACID 360 MG/1
2 TABLET, DELAYED RELEASE ORAL
Qty: 120 | Refills: 11
Start: 2025-05-25 | End: 2026-05-19

## 2025-05-25 RX ADMIN — PREDNISONE 20 MILLIGRAM(S): 20 TABLET ORAL at 09:15

## 2025-05-25 RX ADMIN — POLYETHYLENE GLYCOL 3350 17 GRAM(S): 17 POWDER, FOR SOLUTION ORAL at 12:27

## 2025-05-25 RX ADMIN — Medication 10 MILLIGRAM(S): at 06:20

## 2025-05-25 RX ADMIN — EZETIMIBE 10 MILLIGRAM(S): 10 TABLET ORAL at 12:27

## 2025-05-25 RX ADMIN — Medication 81 MILLIGRAM(S): at 12:27

## 2025-05-25 RX ADMIN — Medication 500000 UNIT(S): at 12:27

## 2025-05-25 RX ADMIN — Medication 500000 UNIT(S): at 06:20

## 2025-05-25 RX ADMIN — Medication 1 TABLET(S): at 12:27

## 2025-05-25 RX ADMIN — Medication 500000 UNIT(S): at 00:31

## 2025-05-25 RX ADMIN — VALGANCICLOVIR 450 MILLIGRAM(S): 450 TABLET, FILM COATED ORAL at 12:27

## 2025-05-25 RX ADMIN — SERTRALINE 50 MILLIGRAM(S): 100 TABLET, FILM COATED ORAL at 12:27

## 2025-05-25 RX ADMIN — TACROLIMUS 7 MILLIGRAM(S): 0.5 CAPSULE ORAL at 09:14

## 2025-05-25 RX ADMIN — MYCOPHENOLIC ACID 720 MILLIGRAM(S): 360 TABLET, DELAYED RELEASE ORAL at 09:14

## 2025-05-25 RX ADMIN — INSULIN LISPRO 2: 100 INJECTION, SOLUTION INTRAVENOUS; SUBCUTANEOUS at 12:27

## 2025-05-25 RX ADMIN — APIXABAN 2.5 MILLIGRAM(S): 2.5 TABLET, FILM COATED ORAL at 06:20

## 2025-05-25 RX ADMIN — Medication 50 MICROGRAM(S): at 05:04

## 2025-05-25 RX ADMIN — Medication 1 APPLICATION(S): at 12:35

## 2025-05-25 RX ADMIN — CARVEDILOL 6.25 MILLIGRAM(S): 3.12 TABLET, FILM COATED ORAL at 00:31

## 2025-05-25 RX ADMIN — BUPROPION HYDROBROMIDE 450 MILLIGRAM(S): 522 TABLET, EXTENDED RELEASE ORAL at 09:14

## 2025-05-25 RX ADMIN — Medication 40 MILLIGRAM(S): at 06:20

## 2025-05-25 RX ADMIN — Medication 63.75 MILLIMOLE(S): at 09:16

## 2025-05-25 RX ADMIN — INSULIN LISPRO 1: 100 INJECTION, SOLUTION INTRAVENOUS; SUBCUTANEOUS at 09:14

## 2025-05-25 RX ADMIN — CARVEDILOL 6.25 MILLIGRAM(S): 3.12 TABLET, FILM COATED ORAL at 09:14

## 2025-05-25 NOTE — PROGRESS NOTE ADULT - ASSESSMENT
55 year old male with PMH of HTN, HLD, DMT2 (on Mounjaro and Farxiga, managed by PCP, previously was on lantus and humalog insulin were d/c'ed as mounjaro was added), childhood asthma (no intubations/no longer uses inhaler), HOLLAND (noncompliant with CPAP), hypothyroidism, depression, CVA (around 2018/2019 - residual parasthesias s/p loop recorder placement and Eliquis BID) with recurrent TIAs (2020), paratracheal nodule s/p removal (benign, 2 years ago at [facility], no difficulty swallowing, unsure of surgeon's name) and CKD (eGFR 16, not currently on dialysis) presents for and now s/p pre-emptive LDRT 2a/1v/1u+stent under Simulect induction on 5/20/2025.    Patient's Nephrologist is Dr. Kristen Brenner.    1. s/p LDRT on 5/20/25 - Allograft function is good , Cr trended down well.   2.IS meds- Simulect induction ( zero mismatch . [place]ro by level, MMF 1gm BID , SST (short taper and steroid withdrawal). Ppx: Bactrim, Valcyte, Nystatin  3.DM- Insulin as needed  4.HTN- BP above goal. can increase coreg to 25 mg po bid   5.h/o CVA - on eliquis

## 2025-05-25 NOTE — DISCHARGE NOTE NURSING/CASE MANAGEMENT/SOCIAL WORK - NSDCPEPT PROEDMA_GEN_ALL_CORE
Yes
You can access the FollowMyHealth Patient Portal offered by [facility] by registering at the following website: [email or web address]. By joining [employer]’s FollowMyHealth portal, you will also be able to view your health information using other applications (apps) compatible with our system.

## 2025-05-25 NOTE — PROGRESS NOTE ADULT - ASSESSMENT
55 year old male with PMH HTN, HLD, DMT2 (on Mounjaro and Farxiga, managed by PCP, reports most recent HGA1C around 6%, reports lantus and humalog insulin were d/c'ed and mounjaro was added), childhood asthma (no intubations/no longer uses inhaler), HOLLAND (noncompliant with CPAP), hypothyroidism, depression, CVA (around 2018/2019 - residual parasthesias s/p loop recorder placement and Eliquis BID) with recurrent TIAs (2020), paratracheal nodule s/p removal (benign, 2 years ago at [facility], no difficulty swallowing, unsure of surgeon's name) and CKD (eGFR 16, not currently on dialysis) presents for scheduled open living kidney transplant ot the right side, possible left with Dr. Webber.    Patient is now s/p LDRT 2a/1v/1u+stent under Simulect induction on 5/20/2025.     [] POD#5 LDRT  - Strict I's&O's,  DC QUAN  - Renal Doppler: patent, homogenous flow, no collection, no RONAN.   - pain control  - bowel regimen -> Protonix BID  - reg diet  - SCDs/IS/ PT  - Increase Coreg to 12.5mg BID      [] IMMUNO  - Simulect induction    - Env by level , Myfortic 720 BID, Rapid steroid withdrawal last dose today 20mg    [] DVT PPX  - SCDs  - Eliquis/ASA    []Dispo  - Plan for DC home today

## 2025-05-25 NOTE — DISCHARGE NOTE PROVIDER - CARE PROVIDERS DIRECT ADDRESSES
[email or web address]

## 2025-05-25 NOTE — PROGRESS NOTE ADULT - PROVIDER SPECIALTY LIST ADULT
Transplant Nephrology
Transplant Nephrology
Pharmacy
Transplant Nephrology
Transplant Surgery
Transplant Nephrology
Transplant Surgery

## 2025-05-25 NOTE — DISCHARGE NOTE NURSING/CASE MANAGEMENT/SOCIAL WORK - PATIENT PORTAL LINK FT
You can access the FollowMyHealth Patient Portal offered by [facility] by registering at the following website: [email or web address]. By joining [employer]’s FollowMyHealth portal, you will also be able to view your health information using other applications (apps) compatible with our system.

## 2025-05-25 NOTE — DISCHARGE NOTE NURSING/CASE MANAGEMENT/SOCIAL WORK - FINANCIAL ASSISTANCE
[facility] provides services at a reduced cost to those who are determined to be eligible through [facility]’s financial assistance program. Information regarding [facility]’s financial assistance program can be found by going to [email or web address] or by calling [phone].

## 2025-05-25 NOTE — DISCHARGE NOTE PROVIDER - CARE PROVIDER_API CALL
Ramin Webber Elijah  Surgery  [address] [place] [address]  Phone: [phone]  Fax: [phone]  Follow Up Time:     Ambar Kinney  Nephrology  [address] [place] [address]  Phone: [phone]  Fax: [phone]  Follow Up Time:

## 2025-05-25 NOTE — CHART NOTE - NSCHARTNOTEFT_GEN_A_CORE
Patient seen and worked with physical therapist who recommended for rolling walker during ambulation. Pt will require rolling walker due to decreased mobility post renal transplant.

## 2025-05-25 NOTE — DISCHARGE NOTE PROVIDER - HOSPITAL COURSE
55 year old male with PMH HTN, HLD, DMT2 (on Mounjaro and Farxiga, managed by PCP, reports most recent HGA1C around 6%, reports lantus and humalog insulin were d/c'ed and mounjaro was added), childhood asthma (no intubations/no longer uses inhaler), HOLLAND (noncompliant with CPAP), hypothyroidism, depression, CVA (around 2018/2019 - residual parasthesias s/p loop recorder placement and Eliquis BID) with recurrent TIAs (2020), paratracheal nodule s/p removal (benign, 2 years ago at [facility], no difficulty swallowing, unsure of surgeon's name) and CKD (eGFR 16, not currently on dialysis) now s/p open living kidney transplant ot the right side, with Dr. Webber on 5/20/25.    Did well from surgical perspective. Tolerated PO, ambulated, had bowel function. passed TOV after gibson removal. Excellent graft function. Cr downtrended to 1.08  on d/c. good flow on doppler. Completed Simulect.    Was evaluated daily by our multidisciplinary transplant team of surgeons, nephrologists, pharmacists, ACPs, RNs, Nutrition and deemed safe for d/c home with the following plan:    []s/p LDRT 2a/1v/1u+stent under Simulect induction on 5/20/2025.   -ENVARSUS 5mg qd, Myfortic 720 BID, Rapid steroid withdrawal last dose today 20mg  -standard PPx Nystatin/Bactrim/Valcyte/PPI  -Coreg to 12.5mg BID  - Eliquis/ASA    -f/u with nephrologist on Tuesday 5/27/25  -f/u with  in 4-6 weeks for ureteral stent removal.

## 2025-05-25 NOTE — DISCHARGE NOTE PROVIDER - NSDCCPCAREPLAN_GEN_ALL_CORE_FT
PRINCIPAL DISCHARGE DIAGNOSIS  Diagnosis: S/P living-donor kidney transplantation  Assessment and Plan of Treatment: Kidney replaced by transplant  No heavy lifting anything more than 10-15lbs or straining. Otherwise, you may return to your usual level of physical activity. If you are taking narcotic pain medication (such as Percocet), do NOT drive a car, operate machinery or make important decisions.  Call trnansplant clinic If you developed any of the following, fever, pain, redness, swelling at incision site, cough, nausea, vomiting, painful urination, difficulty urination, or not making any urine.  NOTIFY YOUR SURGEON IF: You have any bleeding that does not stop, any pus draining from your wound, any fever (over 100.4 F) or chills, persistent nausea/vomiting with inability to tolerate food or liquids, persistent diarrhea, or if your pain is not controlled on your discharge pain medications.  Immunosuppression:   Keep away from people who have cough, cold, and symptom of flu.  Only take medications that are on your discharge list  If you missed your medications call the transplant office, do not double up medication because you missed a dose.  If you have any question regarding your medication please call transplant office.      SECONDARY DISCHARGE DIAGNOSES  Diagnosis: Diabetes  Assessment and Plan of Treatment: HgA1C this admission.  Make sure you get your HgA1c checked every three months.  If you take oral diabetes medications, check your blood glucose two times a day.  If you take insulin, check your blood glucose before meals and at bedtime.  It's important not to skip any meals.  Keep a log of your blood glucose results and always take it with you to your doctor appointments.  Keep a list of your current medications including injectables and over the counter medications and bring this medication list with you to all your doctor appointments.  If you have not seen your ophthalmologist this year call for appointment.  Check your feet daily for redness, sores, or openings. Do not self treat. If no improvement in two days call your primary care physician for an appointment.  Low blood sugar (hypoglycemia) is a blood sugar below 70mg/dl. Check your blood sugar if you feel signs/symptoms of hypoglycemia. If your blood sugar is below 70 take 15 grams of carbohydrates (ex 4 oz of apple juice, 3-4 glucose tablets, or 4-6 oz of regular soda) wait 15 minutes and repeat blood sugar to make sure it comes up above 70.  If your blood sugar is above 70 and you are due for a meal, have a meal.  If you are not due for a meal have a snack.  This snack helps keeps your blood sugar at a safe range.

## 2025-05-25 NOTE — PROGRESS NOTE ADULT - SUBJECTIVE AND OBJECTIVE BOX
Transplant Surgery - Multidisciplinary Rounds  --------------------------------------------------------------   Tx Date:  5/20/2025 LDRT       POD#5    Present:   Patient seen and examined with multidisciplinary Transplant team including  Surgeon: Dr. Webber, Fellow: Dr. Henry Nephrologist: Dr. LISETTE Kinney. ACP: Jona and unit RN during am rounds.  Disciplines not in attendance will be notified of the plan    HPI: 55 year old male with PMH HTN, HLD, DMT2 (on Mounjaro and Farxiga, managed by PCP, reports most recent HGA1C around 6%, reports lantus and humalog insulin were d/c'ed and mounjaro was added), childhood asthma (no intubations/no longer uses inhaler), HOLLAND (noncompliant with CPAP), hypothyroidism, depression, CVA (around 2018/2019 - residual parasthesias s/p loop recorder placement and Eliquis BID) with recurrent TIAs (2020), paratracheal nodule s/p removal (benign, 2 years ago at [facility], no difficulty swallowing, unsure of surgeon's name) and CKD (eGFR 16, not currently on dialysis) presents for scheduled open living kidney transplant ot the right side, possible left with Dr. Webber.    Patient is now s/p LDRT 2a/1v/1u+stent under Simulect induction on 5/20/2025.       Interval Events:  - Afebrile, VSS  - [facility] dc ed passed TOV  - Hydralazine, labetalol x1 each for elevated BP overnight      Immunosupression:  Induction: Simulect  Maintenance: FK by level/Myfortic 720 BID/SST  Ongoing monitoring for signs of rejection     Potential Discharge date: TBD  Education:  Medications  Plan of care:  See Below      MEDICATIONS  (STANDING):  apixaban 2.5 milliGRAM(s) Oral two times a day  aspirin enteric coated 81 milliGRAM(s) Oral daily  atorvastatin 80 milliGRAM(s) Oral at bedtime  buPROPion XL (24-Hour) . 450 milliGRAM(s) Oral daily  carvedilol 12.5 milliGRAM(s) Oral every 12 hours  chlorhexidine 2% Cloths 1 Application(s) Topical daily  dextrose 5%. 1000 milliLiter(s) (100 mL/Hr) IV Continuous <Continuous>  dextrose 5%. 1000 milliLiter(s) (50 mL/Hr) IV Continuous <Continuous>  dextrose 50% Injectable 25 Gram(s) IV Push once  dextrose 50% Injectable 12.5 Gram(s) IV Push once  dextrose 50% Injectable 25 Gram(s) IV Push once  ezetimibe 10 milliGRAM(s) Oral daily  glucagon  Injectable 1 milliGRAM(s) IntraMuscular once  insulin lispro (ADMELOG) corrective regimen sliding scale   SubCutaneous three times a day before meals  insulin lispro (ADMELOG) corrective regimen sliding scale   SubCutaneous at bedtime  levothyroxine 50 MICROGram(s) Oral daily  melatonin 3 milliGRAM(s) Oral at bedtime  mycophenolic acid  milliGRAM(s) Oral <User Schedule>  nystatin    Suspension 639210 Unit(s) Swish and Swallow four times a day  pantoprazole    Tablet 40 milliGRAM(s) Oral two times a day  polyethylene glycol 3350 17 Gram(s) Oral daily  senna 2 Tablet(s) Oral at bedtime  sertraline 50 milliGRAM(s) Oral daily  trimethoprim   80 mG/sulfamethoxazole 400 mG 1 Tablet(s) Oral daily  valGANciclovir 450 milliGRAM(s) Oral daily    MEDICATIONS  (PRN):  albuterol/ipratropium for Nebulization 3 milliLiter(s) Nebulizer every 6 hours PRN Shortness of Breath and/or Wheezing  dextrose Oral Gel 15 Gram(s) Oral once PRN Blood Glucose LESS THAN 70 milliGRAM(s)/deciliter  guaifenesin/dextromethorphan Oral Liquid 10 milliLiter(s) Oral every 6 hours PRN Cough  ondansetron Injectable 4 milliGRAM(s) IV Push every 6 hours PRN Nausea and/or Vomiting  traMADol 25 milliGRAM(s) Oral every 6 hours PRN Moderate Pain (4 - 6)  traMADol 50 milliGRAM(s) Oral every 8 hours PRN Severe Pain (7 - 10)      PAST MEDICAL & SURGICAL HISTORY:  Stroke      HTN (hypertension)      Diabetes      Hypothyroidism      Hyperlipidemia      Chronic kidney disease, unspecified      History of TIAs      Childhood asthma      HOLLAND (obstructive sleep apnea)      Numbness and tingling      Tracheal nodule      History of loop recorder      History of coronary angiogram          Vital Signs Last 24 Hrs  T(C): 36.7 (25 May 2025 08:35), Max: 37.1 (25 May 2025 00:30)  T(F): 98.1 (25 May 2025 08:35), Max: 98.7 (25 May 2025 00:30)  HR: 92 (25 May 2025 08:35) (72 - 101)  BP: 137/83 (25 May 2025 08:35) (137/83 - 170/97)  BP(mean): 104 (25 May 2025 08:35) (104 - 126)  RR: 18 (25 May 2025 08:35) (17 - 18)  SpO2: 97% (25 May 2025 08:35) (96% - 99%)    Parameters below as of 25 May 2025 08:35  Patient On (Oxygen Delivery Method): room air        I&O's Summary    24 May 2025 07:01  -  25 May 2025 07:00  --------------------------------------------------------  IN: 1220 mL / OUT: 1118 mL / NET: 102 mL                              13.7   13.91 )-----------( 403      ( 25 May 2025 07:04 )             42.2     05-25    139  |  108  |  25[H]  ----------------------------<  159[H]  4.0   |  15[L]  |  1.08    Ca    8.9      25 May 2025 07:04  Phos  1.7     05-25  Mg     2.1     05-25    TPro  6.4  /  Alb  3.4  /  TBili  0.5  /  DBili  x   /  AST  12  /  ALT  13  /  AlkPhos  90  05-25    Tacrolimus (), Serum: 10.4 ng/mL (05-25 @ 07:04)                Review of systems  Gen: No weight changes, fatigue, fevers/chills, weakness  Skin: No rashes  Head/Eyes/Ears/Mouth: No headache; Normal hearing; Normal vision w/o blurriness; No sinus pain/discomfort, sore throat  Respiratory: No dyspnea, cough, wheezing, hemoptysis  CV: No chest pain, PND, orthopnea  GI: Mild abdominal pain at surgical incision site; Endorses Heartburn, denies diarrhea, constipation, nausea, vomiting, melena, hematochezia  : No increased frequency, dysuria, hematuria, nocturia  MSK: No joint pain/swelling; no back pain; no edema  Neuro: No dizziness/lightheadedness, weakness, seizures, numbness, tingling  Heme: No easy bruising or bleeding  Endo: No heat/cold intolerance  Psych: No significant nervousness, anxiety, stress, depression  All other systems were reviewed and are negative, except as noted.      PHYSICAL EXAM:  Constitutional: Well developed / well nourished  Eyes: Anicteric, PERRLA  ENMT: nc/at  Neck: supple  Respiratory: CTA B/L  Cardiovascular: RRR  Gastrointestinal: Soft, non distended, mild tenderness at the incision site; incision c/d/i; QUAN x1 serosang  Genitourinary: voids  Extremities: SCD's in place and working bilaterally  Vascular: +Dopplerable dp pulses bilaterally, feet warm b/l   Neurological: A&O x3  Skin: no rashes, ulcerations or lesions;  Musculoskeletal: Moving all extremities  Psychiatric: Responsive

## 2025-05-25 NOTE — DISCHARGE NOTE PROVIDER - NSDCQMSTROKE_NEU_ALL_CORE
Bed in lowest position, wheels locked, appropriate side rails in place/Call bell, personal items and telephone in reach/Instruct patient to call for assistance before getting out of bed or chair/Non-slip footwear when patient is out of bed/[place] to call system/Physically safe environment - no spills, clutter or unnecessary equipment/Purposeful Proactive Rounding/Room/bathroom lighting operational, light cord in reach No Bed in lowest position, wheels locked, appropriate side rails in place/Call bell, personal items and telephone in reach/Instruct patient to call for assistance before getting out of bed or chair/Non-slip footwear when patient is out of bed/[place] to call system/Physically safe environment - no spills, clutter or unnecessary equipment/Purposeful Proactive Rounding/Room/bathroom lighting operational, light cord in reach Bed in lowest position, wheels locked, appropriate side rails in place/Call bell, personal items and telephone in reach/Instruct patient to call for assistance before getting out of bed or chair/Non-slip footwear when patient is out of bed/[place] to call system/Physically safe environment - no spills, clutter or unnecessary equipment/Purposeful Proactive Rounding/Room/bathroom lighting operational, light cord in reach

## 2025-05-25 NOTE — DISCHARGE NOTE PROVIDER - NSDCMRMEDTOKEN_GEN_ALL_CORE_FT
apixaban 2.5 mg oral tablet: 1 tab(s) orally 2 times a day  aspirin 81 mg oral delayed release tablet: 1 tab(s) orally once a day  atorvastatin 80 mg oral tablet: 1 tab(s) orally once a day (at bedtime)  buPROPion 450 mg/24 hours (XL) oral tablet, extended release: 1 tab(s) orally once a day  carvedilol 12.5 mg oral tablet: 1 tab(s) orally every 12 hours  ezetimibe 10 mg oral tablet: 1 tab(s) orally once a day  levothyroxine 50 mcg (0.05 mg) oral tablet: 1 tab(s) orally once a day  Myfortic 360 mg oral delayed release tablet: 2 tab(s) orally 2 times a day  nystatin 100,000 units/mL oral suspension: 5 milliliter(s) orally 4 times a day  pantoprazole 40 mg oral delayed release tablet: 1 tab(s) orally 2 times a day  polyethylene glycol 3350 oral powder for reconstitution: 17 gram(s) orally once a day  senna leaf extract oral tablet: 2 tab(s) orally once a day (at bedtime)  sertraline 50 mg oral tablet: 1 tab(s) orally once a day  sulfamethoxazole-trimethoprim 400 mg-80 mg oral tablet: 1 tab(s) orally once a day  tacrolimus 1 mg oral tablet, extended release: 5 tab(s) orally once a day  traMADol 50 mg oral tablet: 1 tab(s) orally every 8 hours as needed for Severe Pain (7 - 10) MDD: 3  valGANciclovir 450 mg oral tablet: 1 tab(s) orally once a day

## 2025-05-25 NOTE — PROGRESS NOTE ADULT - SUBJECTIVE AND OBJECTIVE BOX
[facility] DIVISION OF KIDNEY DISEASES AND HYPERTENSION -- FOLLOW UP NOTE  --------------------------------------------------------------------------------  Chief Complaint:    24 hour events/subjective:  Patient was seen and examined at bedside      PAST HISTORY  --------------------------------------------------------------------------------  No significant changes to PMH, PSH, FHx, SHx, unless otherwise noted    ALLERGIES & MEDICATIONS  --------------------------------------------------------------------------------  Allergies    No Known Allergies    Intolerances      Standing Inpatient Medications  apixaban 2.5 milliGRAM(s) Oral two times a day  aspirin enteric coated 81 milliGRAM(s) Oral daily  atorvastatin 80 milliGRAM(s) Oral at bedtime  buPROPion XL (24-Hour) . 450 milliGRAM(s) Oral daily  carvedilol 6.25 milliGRAM(s) Oral every 12 hours  chlorhexidine 2% Cloths 1 Application(s) Topical daily  dextrose 5%. 1000 milliLiter(s) IV Continuous <Continuous>  dextrose 5%. 1000 milliLiter(s) IV Continuous <Continuous>  dextrose 50% Injectable 25 Gram(s) IV Push once  dextrose 50% Injectable 12.5 Gram(s) IV Push once  dextrose 50% Injectable 25 Gram(s) IV Push once  ezetimibe 10 milliGRAM(s) Oral daily  glucagon  Injectable 1 milliGRAM(s) IntraMuscular once  insulin lispro (ADMELOG) corrective regimen sliding scale   SubCutaneous three times a day before meals  insulin lispro (ADMELOG) corrective regimen sliding scale   SubCutaneous at bedtime  levothyroxine 50 MICROGram(s) Oral daily  melatonin 3 milliGRAM(s) Oral at bedtime  mycophenolic acid  milliGRAM(s) Oral <User Schedule>  nystatin    Suspension 314809 Unit(s) Swish and Swallow four times a day  pantoprazole    Tablet 40 milliGRAM(s) Oral two times a day  polyethylene glycol 3350 17 Gram(s) Oral daily  predniSONE   Tablet   Oral   predniSONE   Tablet 20 milliGRAM(s) Oral once  senna 2 Tablet(s) Oral at bedtime  sertraline 50 milliGRAM(s) Oral daily  tacrolimus ER Tablet (ENVARSUS XR) 7 milliGRAM(s) Oral <User Schedule>  trimethoprim   80 mG/sulfamethoxazole 400 mG 1 Tablet(s) Oral daily  valGANciclovir 450 milliGRAM(s) Oral daily    PRN Inpatient Medications  dextrose Oral Gel 15 Gram(s) Oral once PRN  guaifenesin/dextromethorphan Oral Liquid 10 milliLiter(s) Oral every 6 hours PRN  ondansetron Injectable 4 milliGRAM(s) IV Push every 6 hours PRN  traMADol 25 milliGRAM(s) Oral every 6 hours PRN  traMADol 50 milliGRAM(s) Oral every 8 hours PRN      REVIEW OF SYSTEMS  --------------------------------------------------------------------------------  Gen: No fatigue, fevers/chills, weakness  Skin: No rashes  Head/Eyes/Ears/Mouth: No headache;No sore throat  Respiratory: No dyspnea, cough,   CV: No chest pain, PND, orthopnea  GI: No abdominal pain, diarrhea, constipation, nausea, vomiting  Transplant: No pain  : No increased frequency, dysuria, hematuria, nocturia  MSK: No joint pain/swelling; no back pain; no edema  Neuro: No dizziness/lightheadedness, weakness, seizures, numbness, tingling  Psych: No significant nervousness, anxiety, stress, depression    All other systems were reviewed and are negative, except as noted.    VITALS/PHYSICAL EXAM  --------------------------------------------------------------------------------  T(C): 36.7 (05-25-25 @ 04:42), Max: 37.1 (05-25-25 @ 00:30)  HR: 72 (05-25-25 @ 04:42) (72 - 101)  BP: 167/86 (05-25-25 @ 04:42) (145/95 - 170/97)  RR: 18 (05-25-25 @ 04:42) (17 - 18)  SpO2: 98% (05-25-25 @ 04:42) (96% - 99%)  Wt(kg): --        05-24-25 @ 07:01  -  05-25-25 @ 07:00  --------------------------------------------------------  IN: 1220 mL / OUT: 1118 mL / NET: 102 mL      Physical Exam:  	Gen: NAD  	HEENT: PERRL, supple neck, clear oropharynx  	Pulm: CTA B/L  	CV: RRR, S1S2; no rub  	Back: No spinal or CVA tenderness; no sacral edema  	Abd: +BS, soft, nontender/nondistended                      Transplant: No tenderness, swelling  	: No suprapubic tenderness  	UE: Warm, FROM; no edema; no asterixis  	LE: Warm, FROM; no edema  	Neuro: No focal deficits  	Psych: Normal affect and mood  	Skin: Warm, without rashes      LABS/STUDIES  --------------------------------------------------------------------------------              13.7   13.91 >-----------<  403      [05-25-25 @ 07:04]              42.2     139  |  108  |  25  ----------------------------<  159      [05-25-25 @ 07:04]  4.0   |  15  |  1.08        Ca     8.9     [05-25-25 @ 07:04]      Mg     2.1     [05-25-25 @ 07:04]      Phos  1.7     [05-25-25 @ 07:04]    TPro  6.4  /  Alb  3.4  /  TBili  0.5  /  DBili  x   /  AST  12  /  ALT  13  /  AlkPhos  90  [05-25-25 @ 07:04]        Creatinine Trend:  SCr 1.08 [05-25 @ 07:04]  SCr 0.98 [05-24 @ 07:11]  SCr 1.00 [05-23 @ 07:14]  SCr 1.11 [05-22 @ 07:01]  SCr 1.99 [05-21 @ 07:24]    Tacrolimus (), Serum: 5.7 ng/mL (05-24 @ 07:13)  Tacrolimus (), Serum: 6.5 ng/mL (05-23 @ 07:15)  Tacrolimus (), Serum: 4.0 ng/mL (05-22 @ 07:03)            Urinalysis - [05-25-25 @ 07:04]      Color  / Appearance  / SG  / pH       Gluc 159 / Ketone   / Bili  / Urobili        Blood  / Protein  / Leuk Est  / Nitrite       RBC  / WBC  / Hyaline  / Gran  / Sq Epi  / Non Sq Epi  / Bacteria         HBsAb <3.3      [05-20-25 @ 08:30]  HBsAg Nonreact      [05-20-25 @ 08:30]  HBcAb Nonreact      [05-20-25 @ 08:30]  HCV 0.05, Nonreact      [05-20-25 @ 08:30]  HIV Nonreact      [05-20-25 @ 08:30]

## 2025-05-28 ENCOUNTER — APPOINTMENT (OUTPATIENT)
Dept: NEPHROLOGY | Facility: CLINIC | Age: 55
End: 2025-05-28
Payer: MEDICAID

## 2025-05-28 VITALS
SYSTOLIC BLOOD PRESSURE: 129 MMHG | TEMPERATURE: 97.6 F | HEIGHT: 68 IN | DIASTOLIC BLOOD PRESSURE: 88 MMHG | OXYGEN SATURATION: 99 % | WEIGHT: 209 LBS | HEART RATE: 85 BPM | BODY MASS INDEX: 31.67 KG/M2 | RESPIRATION RATE: 13 BRPM

## 2025-05-28 DIAGNOSIS — Z94.0 OTHER LONG TERM (CURRENT) DRUG THERAPY: ICD-10-CM

## 2025-05-28 DIAGNOSIS — Z79.4 TYPE 2 DIABETES MELLITUS WITH DIABETIC CHRONIC KIDNEY DISEASE: ICD-10-CM

## 2025-05-28 DIAGNOSIS — Z79.899 OTHER LONG TERM (CURRENT) DRUG THERAPY: ICD-10-CM

## 2025-05-28 DIAGNOSIS — I10 ESSENTIAL (PRIMARY) HYPERTENSION: ICD-10-CM

## 2025-05-28 DIAGNOSIS — E11.22 TYPE 2 DIABETES MELLITUS WITH DIABETIC CHRONIC KIDNEY DISEASE: ICD-10-CM

## 2025-05-28 PROCEDURE — 99215 OFFICE O/P EST HI 40 MIN: CPT

## 2025-05-28 RX ORDER — PREDNISONE 5 MG/1
5 TABLET ORAL
Qty: 42 | Refills: 0 | Status: DISCONTINUED | COMMUNITY
Start: 2025-05-21 | End: 2025-05-28

## 2025-05-28 RX ORDER — MYCOPHENOLATE MOFETIL 500 MG/1
500 TABLET ORAL TWICE DAILY
Qty: 120 | Refills: 11 | Status: DISCONTINUED | COMMUNITY
Start: 2025-05-21 | End: 2025-05-28

## 2025-05-28 RX ORDER — TACROLIMUS 1 MG/1
1 TABLET, EXTENDED RELEASE ORAL
Qty: 30 | Refills: 11 | Status: ACTIVE | COMMUNITY
Start: 2025-05-21 | End: 1900-01-01

## 2025-05-28 RX ORDER — INSULIN GLARGINE 100 [IU]/ML
100 INJECTION, SOLUTION SUBCUTANEOUS
Qty: 5 | Refills: 3 | Status: DISCONTINUED | COMMUNITY
Start: 2025-05-21 | End: 2025-05-28

## 2025-05-28 RX ORDER — INSULIN LISPRO 100 [IU]/ML
100 INJECTION, SOLUTION INTRAVENOUS; SUBCUTANEOUS
Qty: 5 | Refills: 2 | Status: DISCONTINUED | COMMUNITY
Start: 2025-05-21 | End: 2025-05-28

## 2025-05-28 RX ORDER — PREDNISONE 5 MG/1
5 TABLET ORAL
Qty: 30 | Refills: 11 | Status: DISCONTINUED | COMMUNITY
Start: 2025-05-21 | End: 2025-05-28

## 2025-05-28 RX ORDER — TACROLIMUS 4 MG/1
4 TABLET, EXTENDED RELEASE ORAL
Qty: 30 | Refills: 5 | Status: ACTIVE | COMMUNITY
Start: 2025-05-21 | End: 1900-01-01

## 2025-05-28 RX ORDER — MYCOPHENILIC ACID 360 MG/1
360 TABLET, DELAYED RELEASE ORAL
Qty: 360 | Refills: 3 | Status: ACTIVE | COMMUNITY
Start: 2025-05-28 | End: 1900-01-01

## 2025-05-29 ENCOUNTER — NON-APPOINTMENT (OUTPATIENT)
Age: 55
End: 2025-05-29

## 2025-05-29 PROBLEM — Z79.899 IMMUNOSUPPRESSIVE MANAGEMENT ENCOUNTER FOLLOWING KIDNEY TRANSPLANT: Status: ACTIVE | Noted: 2025-05-29

## 2025-05-29 LAB
ALBUMIN SERPL ELPH-MCNC: 3.8 G/DL
ALP BLD-CCNC: 104 U/L
ALT SERPL-CCNC: 22 U/L
ANION GAP SERPL CALC-SCNC: 11 MMOL/L
APPEARANCE: CLEAR
AST SERPL-CCNC: 18 U/L
BACTERIA: NEGATIVE /HPF
BASOPHILS # BLD AUTO: 0.05 K/UL
BASOPHILS NFR BLD AUTO: 0.4 %
BILIRUB SERPL-MCNC: 0.4 MG/DL
BILIRUBIN URINE: NEGATIVE
BLOOD URINE: ABNORMAL
BUN SERPL-MCNC: 17 MG/DL
CALCIUM SERPL-MCNC: 8.9 MG/DL
CAST: 2 /LPF
CHLORIDE SERPL-SCNC: 106 MMOL/L
CO2 SERPL-SCNC: 18 MMOL/L
COLOR: YELLOW
CREAT SERPL-MCNC: 1.15 MG/DL
CREAT SPEC-SCNC: 167 MG/DL
CREAT/PROT UR: 0.4 RATIO
EGFRCR SERPLBLD CKD-EPI 2021: 76 ML/MIN/1.73M2
EOSINOPHIL # BLD AUTO: 0.25 K/UL
EOSINOPHIL NFR BLD AUTO: 1.9 %
EPITHELIAL CELLS: 4 /HPF
GLUCOSE QUALITATIVE U: >=1000 MG/DL
GLUCOSE SERPL-MCNC: 234 MG/DL
HCT VFR BLD CALC: 43.1 %
HGB BLD-MCNC: 13.9 G/DL
IMM GRANULOCYTES NFR BLD AUTO: 0.8 %
KETONES URINE: NEGATIVE MG/DL
LEUKOCYTE ESTERASE URINE: NEGATIVE
LYMPHOCYTES # BLD AUTO: 2.27 K/UL
LYMPHOCYTES NFR BLD AUTO: 17.6 %
MAGNESIUM SERPL-MCNC: 1.6 MG/DL
MAN DIFF?: NORMAL
MCHC RBC-ENTMCNC: 28.1 PG
MCHC RBC-ENTMCNC: 32.3 G/DL
MCV RBC AUTO: 87.2 FL
MICROSCOPIC-UA: NORMAL
MONOCYTES # BLD AUTO: 1.03 K/UL
MONOCYTES NFR BLD AUTO: 8 %
NEUTROPHILS # BLD AUTO: 9.18 K/UL
NEUTROPHILS NFR BLD AUTO: 71.3 %
NITRITE URINE: NEGATIVE
PH URINE: 5.5
PHOSPHATE SERPL-MCNC: 1.9 MG/DL
PLATELET # BLD AUTO: 270 K/UL
POTASSIUM SERPL-SCNC: 5.1 MMOL/L
PROT SERPL-MCNC: 6.2 G/DL
PROT UR-MCNC: 72 MG/DL
PROTEIN URINE: 30 MG/DL
RBC # BLD: 4.94 M/UL
RBC # FLD: 15 %
RED BLOOD CELLS URINE: 44 /HPF
SODIUM SERPL-SCNC: 135 MMOL/L
SPECIFIC GRAVITY URINE: >1.03
TACROLIMUS SERPL-MCNC: 8 NG/ML
UROBILINOGEN URINE: 0.2 MG/DL
WBC # FLD AUTO: 12.88 K/UL
WHITE BLOOD CELLS URINE: 6 /HPF

## 2025-06-04 ENCOUNTER — APPOINTMENT (OUTPATIENT)
Dept: TRANSPLANT | Facility: CLINIC | Age: 55
End: 2025-06-04
Payer: MEDICAID

## 2025-06-04 VITALS
HEART RATE: 78 BPM | RESPIRATION RATE: 13 BRPM | SYSTOLIC BLOOD PRESSURE: 127 MMHG | HEIGHT: 68 IN | BODY MASS INDEX: 31.67 KG/M2 | OXYGEN SATURATION: 97 % | WEIGHT: 209 LBS | DIASTOLIC BLOOD PRESSURE: 88 MMHG

## 2025-06-04 PROCEDURE — 99205 OFFICE O/P NEW HI 60 MIN: CPT

## 2025-06-04 RX ORDER — AMOXICILLIN AND CLAVULANATE POTASSIUM 500; 125 MG/1; MG/1
500-125 TABLET, FILM COATED ORAL
Qty: 14 | Refills: 0 | Status: ACTIVE | COMMUNITY
Start: 2025-06-04 | End: 1900-01-01

## 2025-06-05 ENCOUNTER — NON-APPOINTMENT (OUTPATIENT)
Age: 55
End: 2025-06-05

## 2025-06-05 LAB
ALBUMIN SERPL ELPH-MCNC: 3.9 G/DL
ALP BLD-CCNC: 124 U/L
ALT SERPL-CCNC: 19 U/L
ANION GAP SERPL CALC-SCNC: 12 MMOL/L
APPEARANCE: CLEAR
AST SERPL-CCNC: 14 U/L
BACTERIA: NEGATIVE /HPF
BASOPHILS # BLD AUTO: 0.06 K/UL
BASOPHILS NFR BLD AUTO: 0.5 %
BILIRUB SERPL-MCNC: 0.3 MG/DL
BILIRUBIN URINE: NEGATIVE
BKV DNA SPEC QL NAA+PROBE: NOT DETECTED IU/ML
BLOOD URINE: ABNORMAL
BUN SERPL-MCNC: 13 MG/DL
CALCIUM SERPL-MCNC: 9.3 MG/DL
CAST: 3 /LPF
CHLORIDE SERPL-SCNC: 104 MMOL/L
CO2 SERPL-SCNC: 20 MMOL/L
COLOR: YELLOW
CREAT SERPL-MCNC: 1.16 MG/DL
CREAT SPEC-SCNC: 139 MG/DL
CREAT/PROT UR: 0.3 RATIO
EGFRCR SERPLBLD CKD-EPI 2021: 75 ML/MIN/1.73M2
EOSINOPHIL # BLD AUTO: 0.16 K/UL
EOSINOPHIL NFR BLD AUTO: 1.3 %
EPITHELIAL CELLS: 3 /HPF
GLUCOSE QUALITATIVE U: 250 MG/DL
GLUCOSE SERPL-MCNC: 223 MG/DL
HCT VFR BLD CALC: 41.3 %
HGB BLD-MCNC: 13.8 G/DL
IMM GRANULOCYTES NFR BLD AUTO: 0.4 %
KETONES URINE: NEGATIVE MG/DL
LEUKOCYTE ESTERASE URINE: ABNORMAL
LYMPHOCYTES # BLD AUTO: 2.16 K/UL
LYMPHOCYTES NFR BLD AUTO: 17 %
MAGNESIUM SERPL-MCNC: 1.4 MG/DL
MAN DIFF?: NORMAL
MCHC RBC-ENTMCNC: 28.2 PG
MCHC RBC-ENTMCNC: 33.4 G/DL
MCV RBC AUTO: 84.5 FL
MICROSCOPIC-UA: NORMAL
MONOCYTES # BLD AUTO: 0.63 K/UL
MONOCYTES NFR BLD AUTO: 5 %
NEUTROPHILS # BLD AUTO: 9.62 K/UL
NEUTROPHILS NFR BLD AUTO: 75.8 %
NITRITE URINE: NEGATIVE
PH URINE: 5.5
PHOSPHATE SERPL-MCNC: 2 MG/DL
PLATELET # BLD AUTO: 285 K/UL
POTASSIUM SERPL-SCNC: 5.1 MMOL/L
PROT SERPL-MCNC: 6.3 G/DL
PROT UR-MCNC: 44 MG/DL
PROTEIN URINE: 30 MG/DL
RBC # BLD: 4.89 M/UL
RBC # FLD: 15.6 %
RED BLOOD CELLS URINE: 1 /HPF
SODIUM SERPL-SCNC: 136 MMOL/L
SPECIFIC GRAVITY URINE: 1.02
TACROLIMUS SERPL-MCNC: 6.3 NG/ML
UROBILINOGEN URINE: 0.2 MG/DL
WBC # FLD AUTO: 12.68 K/UL
WHITE BLOOD CELLS URINE: 10 /HPF

## 2025-06-11 ENCOUNTER — APPOINTMENT (OUTPATIENT)
Dept: NEPHROLOGY | Facility: CLINIC | Age: 55
End: 2025-06-11
Payer: MEDICAID

## 2025-06-11 VITALS
DIASTOLIC BLOOD PRESSURE: 86 MMHG | WEIGHT: 204 LBS | HEIGHT: 68 IN | OXYGEN SATURATION: 98 % | BODY MASS INDEX: 30.92 KG/M2 | RESPIRATION RATE: 16 BRPM | SYSTOLIC BLOOD PRESSURE: 153 MMHG | TEMPERATURE: 98.1 F | HEART RATE: 80 BPM

## 2025-06-11 PROCEDURE — G2211 COMPLEX E/M VISIT ADD ON: CPT | Mod: NC

## 2025-06-11 PROCEDURE — 99214 OFFICE O/P EST MOD 30 MIN: CPT

## 2025-06-12 ENCOUNTER — NON-APPOINTMENT (OUTPATIENT)
Age: 55
End: 2025-06-12

## 2025-06-12 LAB
ALBUMIN SERPL ELPH-MCNC: 4 G/DL
ALP BLD-CCNC: 143 U/L
ALT SERPL-CCNC: 23 U/L
ANION GAP SERPL CALC-SCNC: 15 MMOL/L
APPEARANCE: CLEAR
AST SERPL-CCNC: 17 U/L
BACTERIA: NEGATIVE /HPF
BASOPHILS # BLD AUTO: 0.04 K/UL
BASOPHILS NFR BLD AUTO: 0.5 %
BILIRUB SERPL-MCNC: 0.4 MG/DL
BILIRUBIN URINE: NEGATIVE
BLOOD URINE: ABNORMAL
BUN SERPL-MCNC: 13 MG/DL
CALCIUM SERPL-MCNC: 9.7 MG/DL
CAST: 0 /LPF
CHLORIDE SERPL-SCNC: 103 MMOL/L
CO2 SERPL-SCNC: 20 MMOL/L
COLOR: YELLOW
CREAT SERPL-MCNC: 1.36 MG/DL
CREAT SPEC-SCNC: 192 MG/DL
CREAT/PROT UR: 0.2 RATIO
EGFRCR SERPLBLD CKD-EPI 2021: 62 ML/MIN/1.73M2
EOSINOPHIL # BLD AUTO: 0.06 K/UL
EOSINOPHIL NFR BLD AUTO: 0.7 %
EPITHELIAL CELLS: 1 /HPF
GLUCOSE QUALITATIVE U: 250 MG/DL
GLUCOSE SERPL-MCNC: 258 MG/DL
HCT VFR BLD CALC: 42.1 %
HGB BLD-MCNC: 14 G/DL
IMM GRANULOCYTES NFR BLD AUTO: 0.2 %
KETONES URINE: NEGATIVE MG/DL
LEUKOCYTE ESTERASE URINE: NEGATIVE
LYMPHOCYTES # BLD AUTO: 1.96 K/UL
LYMPHOCYTES NFR BLD AUTO: 24.4 %
MAGNESIUM SERPL-MCNC: 1.6 MG/DL
MAN DIFF?: NORMAL
MCHC RBC-ENTMCNC: 28.7 PG
MCHC RBC-ENTMCNC: 33.3 G/DL
MCV RBC AUTO: 86.3 FL
MICROSCOPIC-UA: NORMAL
MONOCYTES # BLD AUTO: 0.4 K/UL
MONOCYTES NFR BLD AUTO: 5 %
NEUTROPHILS # BLD AUTO: 5.56 K/UL
NEUTROPHILS NFR BLD AUTO: 69.2 %
NITRITE URINE: NEGATIVE
PH URINE: 6
PHOSPHATE SERPL-MCNC: 2.7 MG/DL
PLATELET # BLD AUTO: 303 K/UL
POTASSIUM SERPL-SCNC: 4.8 MMOL/L
PROT SERPL-MCNC: 6.9 G/DL
PROT UR-MCNC: 43 MG/DL
PROTEIN URINE: 30 MG/DL
RBC # BLD: 4.88 M/UL
RBC # FLD: 15.3 %
RED BLOOD CELLS URINE: 16 /HPF
SODIUM SERPL-SCNC: 138 MMOL/L
SPECIFIC GRAVITY URINE: 1.02
TACROLIMUS SERPL-MCNC: 15.8 NG/ML
UROBILINOGEN URINE: 0.2 MG/DL
WBC # FLD AUTO: 8.04 K/UL
WHITE BLOOD CELLS URINE: 3 /HPF

## 2025-06-16 LAB — BKV DNA SPEC QL NAA+PROBE: NOT DETECTED IU/ML

## 2025-06-18 ENCOUNTER — NON-APPOINTMENT (OUTPATIENT)
Age: 55
End: 2025-06-18

## 2025-06-18 ENCOUNTER — APPOINTMENT (OUTPATIENT)
Dept: TRANSPLANT | Facility: CLINIC | Age: 55
End: 2025-06-18
Payer: MEDICAID

## 2025-06-18 VITALS
BODY MASS INDEX: 30.92 KG/M2 | TEMPERATURE: 98.1 F | RESPIRATION RATE: 16 BRPM | DIASTOLIC BLOOD PRESSURE: 94 MMHG | WEIGHT: 204 LBS | HEART RATE: 84 BPM | SYSTOLIC BLOOD PRESSURE: 129 MMHG | OXYGEN SATURATION: 97 % | HEIGHT: 68 IN

## 2025-06-18 LAB
ALBUMIN SERPL ELPH-MCNC: 4 G/DL
ALP BLD-CCNC: 132 U/L
ALT SERPL-CCNC: 22 U/L
ANION GAP SERPL CALC-SCNC: 14 MMOL/L
APPEARANCE: CLEAR
AST SERPL-CCNC: 16 U/L
BACTERIA: NEGATIVE /HPF
BASOPHILS # BLD AUTO: 0.02 K/UL
BASOPHILS NFR BLD AUTO: 0.3 %
BILIRUB SERPL-MCNC: 0.4 MG/DL
BILIRUBIN URINE: NEGATIVE
BLOOD URINE: ABNORMAL
BUN SERPL-MCNC: 14 MG/DL
CALCIUM SERPL-MCNC: 9.8 MG/DL
CAST: 2 /LPF
CHLORIDE SERPL-SCNC: 105 MMOL/L
CO2 SERPL-SCNC: 22 MMOL/L
COLOR: YELLOW
CREAT SERPL-MCNC: 1.22 MG/DL
CREAT SPEC-SCNC: 170 MG/DL
CREAT/PROT UR: 0.2 RATIO
EGFRCR SERPLBLD CKD-EPI 2021: 71 ML/MIN/1.73M2
EOSINOPHIL # BLD AUTO: 0.05 K/UL
EOSINOPHIL NFR BLD AUTO: 0.7 %
EPITHELIAL CELLS: 1 /HPF
GLUCOSE QUALITATIVE U: NEGATIVE MG/DL
GLUCOSE SERPL-MCNC: 171 MG/DL
HBV SURFACE AG SER QL: NONREACTIVE
HCT VFR BLD CALC: 41.4 %
HCV AB SER QL: NONREACTIVE
HCV S/CO RATIO: 0.12 S/CO
HGB BLD-MCNC: 13.5 G/DL
HIV1+2 AB SPEC QL IA.RAPID: NONREACTIVE
IMM GRANULOCYTES NFR BLD AUTO: 0.4 %
KETONES URINE: NEGATIVE MG/DL
LEUKOCYTE ESTERASE URINE: NEGATIVE
LYMPHOCYTES # BLD AUTO: 1.94 K/UL
LYMPHOCYTES NFR BLD AUTO: 26 %
MAGNESIUM SERPL-MCNC: 1.7 MG/DL
MAN DIFF?: NORMAL
MCHC RBC-ENTMCNC: 28.7 PG
MCHC RBC-ENTMCNC: 32.6 G/DL
MCV RBC AUTO: 88.1 FL
MICROSCOPIC-UA: NORMAL
MONOCYTES # BLD AUTO: 0.44 K/UL
MONOCYTES NFR BLD AUTO: 5.9 %
NEUTROPHILS # BLD AUTO: 4.97 K/UL
NEUTROPHILS NFR BLD AUTO: 66.7 %
NITRITE URINE: NEGATIVE
PH URINE: 6
PHOSPHATE SERPL-MCNC: 2.7 MG/DL
PLATELET # BLD AUTO: 303 K/UL
POTASSIUM SERPL-SCNC: 4.4 MMOL/L
PROT SERPL-MCNC: 6.7 G/DL
PROT UR-MCNC: 38 MG/DL
PROTEIN URINE: 30 MG/DL
RBC # BLD: 4.7 M/UL
RBC # FLD: 15 %
RED BLOOD CELLS URINE: 3 /HPF
REVIEW: NORMAL
SODIUM SERPL-SCNC: 140 MMOL/L
SPECIFIC GRAVITY URINE: 1.02
TACROLIMUS SERPL-MCNC: 4.8 NG/ML
UROBILINOGEN URINE: 0.2 MG/DL
WBC # FLD AUTO: 7.45 K/UL
WHITE BLOOD CELLS URINE: 3 /HPF

## 2025-06-18 PROCEDURE — 99214 OFFICE O/P EST MOD 30 MIN: CPT | Mod: 24

## 2025-06-18 RX ORDER — TACROLIMUS 4 MG/1
4 TABLET, EXTENDED RELEASE ORAL
Qty: 3 | Refills: 3 | Status: ACTIVE | COMMUNITY
Start: 2025-06-18 | End: 1900-01-01

## 2025-06-19 LAB
BKV DNA SPEC QL NAA+PROBE: NOT DETECTED IU/ML
HBV DNA # SERPL NAA+PROBE: NOT DETECTED IU/ML
HCV RNA SERPL NAA DL=5-ACNC: NOT DETECTED IU/ML
HCV RNA SERPL NAA+PROBE-LOG IU: NOT DETECTED LOGIU/ML
HEPB DNA PCR INT: NOT DETECTED
HEPB DNA PCR LOG: NOT DETECTED LOGIU/ML
HEPC RNA INTERP: NOT DETECTED
HIV1 RNA # SERPL NAA+PROBE: NORMAL
HIV1 RNA # SERPL NAA+PROBE: NORMAL COPIES/ML
VIRAL LOAD INTERP: NORMAL
VIRAL LOAD LOG: NORMAL LG COP/ML

## 2025-07-02 ENCOUNTER — APPOINTMENT (OUTPATIENT)
Dept: NEPHROLOGY | Facility: CLINIC | Age: 55
End: 2025-07-02
Payer: MEDICAID

## 2025-07-02 VITALS
DIASTOLIC BLOOD PRESSURE: 81 MMHG | HEART RATE: 86 BPM | OXYGEN SATURATION: 94 % | TEMPERATURE: 98.7 F | SYSTOLIC BLOOD PRESSURE: 105 MMHG | RESPIRATION RATE: 16 BRPM | HEIGHT: 68 IN | WEIGHT: 204 LBS | BODY MASS INDEX: 30.92 KG/M2

## 2025-07-02 PROCEDURE — 99214 OFFICE O/P EST MOD 30 MIN: CPT

## 2025-07-02 PROCEDURE — G2211 COMPLEX E/M VISIT ADD ON: CPT | Mod: NC

## 2025-07-03 ENCOUNTER — NON-APPOINTMENT (OUTPATIENT)
Age: 55
End: 2025-07-03

## 2025-07-03 LAB
ALBUMIN SERPL ELPH-MCNC: 4.1 G/DL
ALP BLD-CCNC: 115 U/L
ALT SERPL-CCNC: 25 U/L
ANION GAP SERPL CALC-SCNC: 13 MMOL/L
APPEARANCE: CLEAR
AST SERPL-CCNC: 18 U/L
BACTERIA: NEGATIVE /HPF
BASOPHILS # BLD AUTO: 0.04 K/UL
BASOPHILS NFR BLD AUTO: 0.4 %
BILIRUB SERPL-MCNC: 0.3 MG/DL
BILIRUBIN URINE: NEGATIVE
BKV DNA SPEC QL NAA+PROBE: NOT DETECTED IU/ML
BLOOD URINE: ABNORMAL
BUN SERPL-MCNC: 14 MG/DL
CALCIUM SERPL-MCNC: 9.4 MG/DL
CAST: 0 /LPF
CHLORIDE SERPL-SCNC: 102 MMOL/L
CO2 SERPL-SCNC: 22 MMOL/L
COLOR: YELLOW
CREAT SERPL-MCNC: 1.48 MG/DL
CREAT SPEC-SCNC: 154 MG/DL
CREAT/PROT UR: 0.2 RATIO
EGFRCR SERPLBLD CKD-EPI 2021: 56 ML/MIN/1.73M2
EOSINOPHIL # BLD AUTO: 0.03 K/UL
EOSINOPHIL NFR BLD AUTO: 0.3 %
EPITHELIAL CELLS: 1 /HPF
GLUCOSE QUALITATIVE U: NEGATIVE MG/DL
GLUCOSE SERPL-MCNC: 172 MG/DL
HCT VFR BLD CALC: 41.3 %
HGB BLD-MCNC: 13.8 G/DL
IMM GRANULOCYTES NFR BLD AUTO: 0.3 %
KETONES URINE: NEGATIVE MG/DL
LEUKOCYTE ESTERASE URINE: NEGATIVE
LYMPHOCYTES # BLD AUTO: 1.96 K/UL
LYMPHOCYTES NFR BLD AUTO: 18.8 %
MAGNESIUM SERPL-MCNC: 1.6 MG/DL
MAN DIFF?: NORMAL
MCHC RBC-ENTMCNC: 29 PG
MCHC RBC-ENTMCNC: 33.4 G/DL
MCV RBC AUTO: 86.8 FL
MICROSCOPIC-UA: NORMAL
MONOCYTES # BLD AUTO: 0.54 K/UL
MONOCYTES NFR BLD AUTO: 5.2 %
NEUTROPHILS # BLD AUTO: 7.8 K/UL
NEUTROPHILS NFR BLD AUTO: 75 %
NITRITE URINE: NEGATIVE
PH URINE: 6
PHOSPHATE SERPL-MCNC: 3 MG/DL
PLATELET # BLD AUTO: 325 K/UL
POTASSIUM SERPL-SCNC: 4.6 MMOL/L
PROT SERPL-MCNC: 6.6 G/DL
PROT UR-MCNC: 26 MG/DL
PROTEIN URINE: 30 MG/DL
RBC # BLD: 4.76 M/UL
RBC # FLD: 14.8 %
RED BLOOD CELLS URINE: 38 /HPF
SODIUM SERPL-SCNC: 137 MMOL/L
SPECIFIC GRAVITY URINE: 1.02
TACROLIMUS SERPL-MCNC: 4.6 NG/ML
UROBILINOGEN URINE: 0.2 MG/DL
WBC # FLD AUTO: 10.4 K/UL
WHITE BLOOD CELLS URINE: 2 /HPF

## 2025-07-10 ENCOUNTER — APPOINTMENT (OUTPATIENT)
Dept: ULTRASOUND IMAGING | Facility: HOSPITAL | Age: 55
End: 2025-07-10
Payer: MEDICAID

## 2025-07-10 ENCOUNTER — OUTPATIENT (OUTPATIENT)
Dept: OUTPATIENT SERVICES | Facility: HOSPITAL | Age: 55
LOS: 1 days | End: 2025-07-10
Payer: MEDICAID

## 2025-07-10 DIAGNOSIS — Z98.890 OTHER SPECIFIED POSTPROCEDURAL STATES: Chronic | ICD-10-CM

## 2025-07-10 DIAGNOSIS — Z00.00 ENCOUNTER FOR GENERAL ADULT MEDICAL EXAMINATION WITHOUT ABNORMAL FINDINGS: ICD-10-CM

## 2025-07-10 DIAGNOSIS — J39.8 OTHER SPECIFIED DISEASES OF UPPER RESPIRATORY TRACT: Chronic | ICD-10-CM

## 2025-07-10 PROCEDURE — 76776 US EXAM K TRANSPL W/DOPPLER: CPT | Mod: 26,RT

## 2025-07-10 PROCEDURE — 76776 US EXAM K TRANSPL W/DOPPLER: CPT

## 2025-07-11 ENCOUNTER — NON-APPOINTMENT (OUTPATIENT)
Age: 55
End: 2025-07-11

## 2025-07-15 ENCOUNTER — APPOINTMENT (OUTPATIENT)
Dept: TRANSPLANT | Facility: CLINIC | Age: 55
End: 2025-07-15

## 2025-07-15 PROBLEM — Z96.0 PRESENCE OF UROGENITAL IMPLANT: Status: ACTIVE | Noted: 2025-07-15

## 2025-07-15 PROBLEM — Z94.0 HISTORY OF RENAL TRANSPLANTATION: Status: ACTIVE | Noted: 2025-07-15

## 2025-07-15 PROCEDURE — 52310 CYSTOSCOPY AND TREATMENT: CPT | Mod: 58

## 2025-07-16 ENCOUNTER — APPOINTMENT (OUTPATIENT)
Dept: NEPHROLOGY | Facility: CLINIC | Age: 55
End: 2025-07-16
Payer: MEDICAID

## 2025-07-16 ENCOUNTER — LABORATORY RESULT (OUTPATIENT)
Age: 55
End: 2025-07-16

## 2025-07-16 VITALS
OXYGEN SATURATION: 98 % | RESPIRATION RATE: 16 BRPM | WEIGHT: 206 LBS | HEIGHT: 68 IN | BODY MASS INDEX: 31.22 KG/M2 | SYSTOLIC BLOOD PRESSURE: 124 MMHG | HEART RATE: 80 BPM | DIASTOLIC BLOOD PRESSURE: 83 MMHG | TEMPERATURE: 98.1 F

## 2025-07-16 LAB
ALBUMIN SERPL ELPH-MCNC: 4 G/DL
ALP BLD-CCNC: 101 U/L
ALT SERPL-CCNC: 18 U/L
ANION GAP SERPL CALC-SCNC: 15 MMOL/L
APPEARANCE: CLEAR
AST SERPL-CCNC: 17 U/L
BILIRUB SERPL-MCNC: 0.4 MG/DL
BILIRUBIN URINE: NEGATIVE
BLOOD URINE: ABNORMAL
BUN SERPL-MCNC: 15 MG/DL
CALCIUM SERPL-MCNC: 9.6 MG/DL
CHLORIDE SERPL-SCNC: 104 MMOL/L
CO2 SERPL-SCNC: 21 MMOL/L
COLOR: YELLOW
CREAT SERPL-MCNC: 1.43 MG/DL
CREAT SPEC-SCNC: 62 MG/DL
CREAT/PROT UR: 0.1 RATIO
EGFRCR SERPLBLD CKD-EPI 2021: 59 ML/MIN/1.73M2
GLUCOSE QUALITATIVE U: NEGATIVE MG/DL
GLUCOSE SERPL-MCNC: 167 MG/DL
HCT VFR BLD CALC: 39.3 %
HGB BLD-MCNC: 13.5 G/DL
KETONES URINE: NEGATIVE MG/DL
LDH SERPL-CCNC: 154 U/L
LEUKOCYTE ESTERASE URINE: NEGATIVE
MAGNESIUM SERPL-MCNC: 1.6 MG/DL
MCHC RBC-ENTMCNC: 29.2 PG
MCHC RBC-ENTMCNC: 34.4 G/DL
MCV RBC AUTO: 85.1 FL
NITRITE URINE: NEGATIVE
PH URINE: 6.5
PHOSPHATE SERPL-MCNC: 3.3 MG/DL
PLATELET # BLD AUTO: 335 K/UL
POTASSIUM SERPL-SCNC: 5 MMOL/L
PROT SERPL-MCNC: 6.4 G/DL
PROT UR-MCNC: 7 MG/DL
PROTEIN URINE: NEGATIVE MG/DL
RBC # BLD: 4.62 M/UL
RBC # FLD: 14.7 %
SODIUM SERPL-SCNC: 140 MMOL/L
SPECIFIC GRAVITY URINE: 1.01
TACROLIMUS SERPL-MCNC: 6.6 NG/ML
URATE SERPL-MCNC: 4.9 MG/DL
UROBILINOGEN URINE: 0.2 MG/DL
WBC # FLD AUTO: 10.15 K/UL

## 2025-07-16 PROCEDURE — 99214 OFFICE O/P EST MOD 30 MIN: CPT

## 2025-07-16 RX ORDER — TACROLIMUS 1 MG/1
1 TABLET, EXTENDED RELEASE ORAL DAILY
Qty: 60 | Refills: 11 | Status: ACTIVE | COMMUNITY
Start: 2025-07-16 | End: 1900-01-01

## 2025-07-17 LAB — BKV DNA SPEC QL NAA+PROBE: NOT DETECTED IU/ML

## 2025-07-30 ENCOUNTER — APPOINTMENT (OUTPATIENT)
Dept: NEPHROLOGY | Facility: CLINIC | Age: 55
End: 2025-07-30
Payer: MEDICAID

## 2025-07-30 VITALS
SYSTOLIC BLOOD PRESSURE: 104 MMHG | RESPIRATION RATE: 16 BRPM | OXYGEN SATURATION: 98 % | HEART RATE: 82 BPM | WEIGHT: 206 LBS | DIASTOLIC BLOOD PRESSURE: 73 MMHG | BODY MASS INDEX: 31.22 KG/M2 | TEMPERATURE: 98.4 F | HEIGHT: 68 IN

## 2025-07-30 DIAGNOSIS — Z79.4 TYPE 2 DIABETES MELLITUS WITH DIABETIC CHRONIC KIDNEY DISEASE: ICD-10-CM

## 2025-07-30 DIAGNOSIS — E11.22 TYPE 2 DIABETES MELLITUS WITH DIABETIC CHRONIC KIDNEY DISEASE: ICD-10-CM

## 2025-07-30 DIAGNOSIS — Z79.899 OTHER LONG TERM (CURRENT) DRUG THERAPY: ICD-10-CM

## 2025-07-30 DIAGNOSIS — Z94.0 OTHER LONG TERM (CURRENT) DRUG THERAPY: ICD-10-CM

## 2025-07-30 DIAGNOSIS — I10 ESSENTIAL (PRIMARY) HYPERTENSION: ICD-10-CM

## 2025-07-30 PROCEDURE — 99214 OFFICE O/P EST MOD 30 MIN: CPT

## 2025-07-30 PROCEDURE — G2211 COMPLEX E/M VISIT ADD ON: CPT | Mod: NC

## 2025-07-31 ENCOUNTER — NON-APPOINTMENT (OUTPATIENT)
Age: 55
End: 2025-07-31

## 2025-07-31 LAB
ALBUMIN SERPL ELPH-MCNC: 4.3 G/DL
ALP BLD-CCNC: 113 U/L
ALT SERPL-CCNC: 19 U/L
ANION GAP SERPL CALC-SCNC: 14 MMOL/L
APPEARANCE: CLEAR
AST SERPL-CCNC: 19 U/L
BACTERIA: NEGATIVE /HPF
BASOPHILS # BLD AUTO: 0.04 K/UL
BASOPHILS NFR BLD AUTO: 0.4 %
BILIRUB SERPL-MCNC: 0.4 MG/DL
BILIRUBIN URINE: NEGATIVE
BLOOD URINE: NEGATIVE
BUN SERPL-MCNC: 16 MG/DL
CALCIUM SERPL-MCNC: 9.9 MG/DL
CAST: 0 /LPF
CHLORIDE SERPL-SCNC: 103 MMOL/L
CMV DNA SPEC QL NAA+PROBE: NOT DETECTED IU/ML
CMVPCR LOG: NOT DETECTED LOG10IU/ML
CO2 SERPL-SCNC: 20 MMOL/L
COLOR: YELLOW
CREAT SERPL-MCNC: 1.47 MG/DL
CREAT SPEC-SCNC: 65 MG/DL
CREAT/PROT UR: 0.1 RATIO
EGFRCR SERPLBLD CKD-EPI 2021: 57 ML/MIN/1.73M2
EOSINOPHIL # BLD AUTO: 0.03 K/UL
EOSINOPHIL NFR BLD AUTO: 0.3 %
EPITHELIAL CELLS: 0 /HPF
GLUCOSE QUALITATIVE U: NEGATIVE MG/DL
GLUCOSE SERPL-MCNC: 159 MG/DL
HCT VFR BLD CALC: 42.4 %
HGB BLD-MCNC: 13.9 G/DL
IMM GRANULOCYTES NFR BLD AUTO: 0.3 %
KETONES URINE: NEGATIVE MG/DL
LEUKOCYTE ESTERASE URINE: NEGATIVE
LYMPHOCYTES # BLD AUTO: 2.28 K/UL
LYMPHOCYTES NFR BLD AUTO: 20.9 %
MAGNESIUM SERPL-MCNC: 1.7 MG/DL
MAN DIFF?: NORMAL
MCHC RBC-ENTMCNC: 29.1 PG
MCHC RBC-ENTMCNC: 32.8 G/DL
MCV RBC AUTO: 88.7 FL
MICROSCOPIC-UA: NORMAL
MONOCYTES # BLD AUTO: 0.69 K/UL
MONOCYTES NFR BLD AUTO: 6.3 %
NEUTROPHILS # BLD AUTO: 7.83 K/UL
NEUTROPHILS NFR BLD AUTO: 71.8 %
NITRITE URINE: NEGATIVE
PH URINE: 5.5
PHOSPHATE SERPL-MCNC: 3.2 MG/DL
PLATELET # BLD AUTO: 347 K/UL
POTASSIUM SERPL-SCNC: 5 MMOL/L
PROT SERPL-MCNC: 7 G/DL
PROT UR-MCNC: 8 MG/DL
PROTEIN URINE: NEGATIVE MG/DL
RBC # BLD: 4.78 M/UL
RBC # FLD: 15.1 %
RED BLOOD CELLS URINE: 0 /HPF
SODIUM SERPL-SCNC: 137 MMOL/L
SPECIFIC GRAVITY URINE: 1.01
TACROLIMUS SERPL-MCNC: 7.4 NG/ML
UROBILINOGEN URINE: 0.2 MG/DL
WBC # FLD AUTO: 10.9 K/UL
WHITE BLOOD CELLS URINE: 1 /HPF

## 2025-07-31 RX ORDER — CARVEDILOL 6.25 MG/1
6.25 TABLET, FILM COATED ORAL TWICE DAILY
Qty: 60 | Refills: 3 | Status: ACTIVE | COMMUNITY
Start: 2025-07-30 | End: 1900-01-01

## 2025-08-04 LAB — BKV DNA SPEC QL NAA+PROBE: NOT DETECTED IU/ML

## 2025-08-13 ENCOUNTER — NON-APPOINTMENT (OUTPATIENT)
Age: 55
End: 2025-08-13

## 2025-08-13 ENCOUNTER — APPOINTMENT (OUTPATIENT)
Dept: TRANSPLANT | Facility: CLINIC | Age: 55
End: 2025-08-13

## 2025-08-13 DIAGNOSIS — Z94.0 KIDNEY TRANSPLANT STATUS: ICD-10-CM

## 2025-08-13 LAB
ALBUMIN SERPL ELPH-MCNC: 4.1 G/DL
ALP BLD-CCNC: 107 U/L
ALT SERPL-CCNC: 17 U/L
ANION GAP SERPL CALC-SCNC: 13 MMOL/L
APPEARANCE: CLEAR
AST SERPL-CCNC: 14 U/L
BACTERIA: NEGATIVE /HPF
BASOPHILS # BLD AUTO: 0.03 K/UL
BASOPHILS NFR BLD AUTO: 0.4 %
BILIRUB SERPL-MCNC: 0.3 MG/DL
BILIRUBIN URINE: NEGATIVE
BLOOD URINE: NEGATIVE
BUN SERPL-MCNC: 15 MG/DL
CALCIUM SERPL-MCNC: 9.2 MG/DL
CAST: 1 /LPF
CHLORIDE SERPL-SCNC: 105 MMOL/L
CO2 SERPL-SCNC: 20 MMOL/L
COLOR: YELLOW
CREAT SERPL-MCNC: 1.39 MG/DL
CREAT SPEC-SCNC: 98 MG/DL
CREAT/PROT UR: 0.1 RATIO
EGFRCR SERPLBLD CKD-EPI 2021: 61 ML/MIN/1.73M2
EOSINOPHIL # BLD AUTO: 0.03 K/UL
EOSINOPHIL NFR BLD AUTO: 0.4 %
EPITHELIAL CELLS: 1 /HPF
GLUCOSE QUALITATIVE U: NEGATIVE MG/DL
GLUCOSE SERPL-MCNC: 171 MG/DL
HCT VFR BLD CALC: 40.8 %
HGB BLD-MCNC: 13.6 G/DL
IMM GRANULOCYTES NFR BLD AUTO: 0.4 %
KETONES URINE: NEGATIVE MG/DL
LEUKOCYTE ESTERASE URINE: NEGATIVE
LYMPHOCYTES # BLD AUTO: 2.02 K/UL
LYMPHOCYTES NFR BLD AUTO: 24.8 %
MAGNESIUM SERPL-MCNC: 1.6 MG/DL
MAN DIFF?: NORMAL
MCHC RBC-ENTMCNC: 29.3 PG
MCHC RBC-ENTMCNC: 33.3 G/DL
MCV RBC AUTO: 87.9 FL
MICROSCOPIC-UA: NORMAL
MONOCYTES # BLD AUTO: 0.52 K/UL
MONOCYTES NFR BLD AUTO: 6.4 %
NEUTROPHILS # BLD AUTO: 5.52 K/UL
NEUTROPHILS NFR BLD AUTO: 67.6 %
NITRITE URINE: NEGATIVE
PH URINE: 5.5
PHOSPHATE SERPL-MCNC: 2.6 MG/DL
PLATELET # BLD AUTO: 295 K/UL
POTASSIUM SERPL-SCNC: 4.9 MMOL/L
PROT SERPL-MCNC: 6.6 G/DL
PROT UR-MCNC: 11 MG/DL
PROTEIN URINE: NEGATIVE MG/DL
RBC # BLD: 4.64 M/UL
RBC # FLD: 14.8 %
RED BLOOD CELLS URINE: 0 /HPF
SODIUM SERPL-SCNC: 138 MMOL/L
SPECIFIC GRAVITY URINE: 1.02
TACROLIMUS SERPL-MCNC: 8.3 NG/ML
UROBILINOGEN URINE: 0.2 MG/DL
WBC # FLD AUTO: 8.15 K/UL
WHITE BLOOD CELLS URINE: 0 /HPF

## 2025-08-14 LAB — BKV DNA SPEC QL NAA+PROBE: NOT DETECTED IU/ML

## 2025-08-27 ENCOUNTER — APPOINTMENT (OUTPATIENT)
Dept: TRANSPLANT | Facility: CLINIC | Age: 55
End: 2025-08-27

## 2025-08-27 DIAGNOSIS — Z94.0 KIDNEY TRANSPLANT STATUS: ICD-10-CM

## 2025-08-28 ENCOUNTER — NON-APPOINTMENT (OUTPATIENT)
Age: 55
End: 2025-08-28

## 2025-08-28 LAB
ALBUMIN SERPL ELPH-MCNC: 4.2 G/DL
ALP BLD-CCNC: 110 U/L
ALT SERPL-CCNC: 14 U/L
ANION GAP SERPL CALC-SCNC: 13 MMOL/L
APPEARANCE: CLEAR
AST SERPL-CCNC: 16 U/L
BACTERIA: NEGATIVE /HPF
BASOPHILS # BLD AUTO: 0.03 K/UL
BASOPHILS NFR BLD AUTO: 0.5 %
BILIRUB SERPL-MCNC: 0.4 MG/DL
BILIRUBIN URINE: NEGATIVE
BLOOD URINE: NEGATIVE
BUN SERPL-MCNC: 14 MG/DL
CALCIUM SERPL-MCNC: 9.4 MG/DL
CALCIUM SERPL-MCNC: 9.4 MG/DL
CAST: 0 /LPF
CHLORIDE SERPL-SCNC: 105 MMOL/L
CMV DNA SPEC QL NAA+PROBE: NOT DETECTED IU/ML
CMVPCR LOG: NOT DETECTED LOG10IU/ML
CO2 SERPL-SCNC: 22 MMOL/L
COLOR: YELLOW
CREAT SERPL-MCNC: 1.42 MG/DL
CREAT SPEC-SCNC: 151 MG/DL
CREAT/PROT UR: 0.1 RATIO
EGFRCR SERPLBLD CKD-EPI 2021: 59 ML/MIN/1.73M2
EOSINOPHIL # BLD AUTO: 0.01 K/UL
EOSINOPHIL NFR BLD AUTO: 0.2 %
EPITHELIAL CELLS: 1 /HPF
GLUCOSE QUALITATIVE U: NEGATIVE MG/DL
GLUCOSE SERPL-MCNC: 134 MG/DL
HCT VFR BLD CALC: 40.9 %
HGB BLD-MCNC: 13.8 G/DL
IMM GRANULOCYTES NFR BLD AUTO: 0.3 %
KETONES URINE: NEGATIVE MG/DL
LEUKOCYTE ESTERASE URINE: NEGATIVE
LYMPHOCYTES # BLD AUTO: 1.86 K/UL
LYMPHOCYTES NFR BLD AUTO: 28.6 %
MAGNESIUM SERPL-MCNC: 1.7 MG/DL
MAN DIFF?: NORMAL
MCHC RBC-ENTMCNC: 29.5 PG
MCHC RBC-ENTMCNC: 33.7 G/DL
MCV RBC AUTO: 87.4 FL
MICROSCOPIC-UA: NORMAL
MONOCYTES # BLD AUTO: 0.59 K/UL
MONOCYTES NFR BLD AUTO: 9.1 %
NEUTROPHILS # BLD AUTO: 4 K/UL
NEUTROPHILS NFR BLD AUTO: 61.3 %
NITRITE URINE: NEGATIVE
PARATHYROID HORMONE INTACT: 106 PG/ML
PH URINE: 5.5
PHOSPHATE SERPL-MCNC: 2.8 MG/DL
PLATELET # BLD AUTO: 298 K/UL
POTASSIUM SERPL-SCNC: 4.9 MMOL/L
PROT SERPL-MCNC: 6.6 G/DL
PROT UR-MCNC: 16 MG/DL
PROTEIN URINE: NEGATIVE MG/DL
RBC # BLD: 4.68 M/UL
RBC # FLD: 14.4 %
RED BLOOD CELLS URINE: 0 /HPF
SODIUM SERPL-SCNC: 139 MMOL/L
SPECIFIC GRAVITY URINE: 1.02
TACROLIMUS SERPL-MCNC: 8.7 NG/ML
UROBILINOGEN URINE: 0.2 MG/DL
WBC # FLD AUTO: 6.51 K/UL
WHITE BLOOD CELLS URINE: 0 /HPF

## 2025-09-02 LAB — BKV DNA SPEC QL NAA+PROBE: NOT DETECTED IU/ML

## 2025-09-17 ENCOUNTER — APPOINTMENT (OUTPATIENT)
Dept: NEPHROLOGY | Facility: CLINIC | Age: 55
End: 2025-09-17
Payer: MEDICAID

## 2025-09-17 VITALS
HEART RATE: 84 BPM | HEIGHT: 68 IN | BODY MASS INDEX: 30.16 KG/M2 | DIASTOLIC BLOOD PRESSURE: 84 MMHG | TEMPERATURE: 98.2 F | WEIGHT: 199 LBS | OXYGEN SATURATION: 99 % | SYSTOLIC BLOOD PRESSURE: 109 MMHG

## 2025-09-17 DIAGNOSIS — Z79.4 TYPE 2 DIABETES MELLITUS WITH DIABETIC CHRONIC KIDNEY DISEASE: ICD-10-CM

## 2025-09-17 DIAGNOSIS — E11.22 TYPE 2 DIABETES MELLITUS WITH DIABETIC CHRONIC KIDNEY DISEASE: ICD-10-CM

## 2025-09-17 DIAGNOSIS — Z94.0 OTHER LONG TERM (CURRENT) DRUG THERAPY: ICD-10-CM

## 2025-09-17 DIAGNOSIS — Z79.899 OTHER LONG TERM (CURRENT) DRUG THERAPY: ICD-10-CM

## 2025-09-17 DIAGNOSIS — I10 ESSENTIAL (PRIMARY) HYPERTENSION: ICD-10-CM

## 2025-09-17 DIAGNOSIS — Z94.0 KIDNEY TRANSPLANT STATUS: ICD-10-CM

## 2025-09-17 PROCEDURE — G2211 COMPLEX E/M VISIT ADD ON: CPT | Mod: NC

## 2025-09-17 PROCEDURE — 99214 OFFICE O/P EST MOD 30 MIN: CPT

## 2025-09-18 LAB
25(OH)D3 SERPL-MCNC: 22.1 NG/ML
ALBUMIN SERPL ELPH-MCNC: 4.4 G/DL
ALP BLD-CCNC: 93 U/L
ALT SERPL-CCNC: 22 U/L
ANION GAP SERPL CALC-SCNC: 13 MMOL/L
APPEARANCE: CLEAR
AST SERPL-CCNC: 18 U/L
BACTERIA: NEGATIVE /HPF
BASOPHILS # BLD AUTO: 0.03 K/UL
BASOPHILS NFR BLD AUTO: 0.4 %
BILIRUB SERPL-MCNC: 0.5 MG/DL
BILIRUBIN URINE: NEGATIVE
BKV DNA SPEC QL NAA+PROBE: NOT DETECTED IU/ML
BLOOD URINE: NEGATIVE
BUN SERPL-MCNC: 14 MG/DL
CALCIUM SERPL-MCNC: 9.5 MG/DL
CALCIUM SERPL-MCNC: 9.5 MG/DL
CAST: 0 /LPF
CHLORIDE SERPL-SCNC: 104 MMOL/L
CHOLEST SERPL-MCNC: 145 MG/DL
CMV DNA SPEC QL NAA+PROBE: NOT DETECTED IU/ML
CMVPCR LOG: NOT DETECTED LOG10IU/ML
CO2 SERPL-SCNC: 20 MMOL/L
COLOR: YELLOW
CREAT SERPL-MCNC: 1.43 MG/DL
CREAT SPEC-SCNC: 155 MG/DL
CREAT/PROT UR: 0.1 RATIO
EGFRCR SERPLBLD CKD-EPI 2021: 59 ML/MIN/1.73M2
EOSINOPHIL # BLD AUTO: 0.03 K/UL
EOSINOPHIL NFR BLD AUTO: 0.4 %
EPITHELIAL CELLS: 1 /HPF
ESTIMATED AVERAGE GLUCOSE: 160 MG/DL
GLUCOSE QUALITATIVE U: NEGATIVE MG/DL
GLUCOSE SERPL-MCNC: 135 MG/DL
HBA1C MFR BLD HPLC: 7.2 %
HCT VFR BLD CALC: 42.1 %
HDLC SERPL-MCNC: 33 MG/DL
HGB BLD-MCNC: 13.8 G/DL
IMM GRANULOCYTES NFR BLD AUTO: 0.1 %
KETONES URINE: NEGATIVE MG/DL
LDH SERPL-CCNC: 149 U/L
LDLC SERPL-MCNC: 82 MG/DL
LEUKOCYTE ESTERASE URINE: NEGATIVE
LYMPHOCYTES # BLD AUTO: 1.99 K/UL
LYMPHOCYTES NFR BLD AUTO: 25.8 %
MAGNESIUM SERPL-MCNC: 1.8 MG/DL
MAN DIFF?: NORMAL
MCHC RBC-ENTMCNC: 29.4 PG
MCHC RBC-ENTMCNC: 32.8 G/DL
MCV RBC AUTO: 89.6 FL
MICROSCOPIC-UA: NORMAL
MONOCYTES # BLD AUTO: 0.6 K/UL
MONOCYTES NFR BLD AUTO: 7.8 %
NEUTROPHILS # BLD AUTO: 5.06 K/UL
NEUTROPHILS NFR BLD AUTO: 65.5 %
NITRITE URINE: NEGATIVE
NONHDLC SERPL-MCNC: 112 MG/DL
PARATHYROID HORMONE INTACT: 111 PG/ML
PH URINE: 5.5
PHOSPHATE SERPL-MCNC: 2.9 MG/DL
PLATELET # BLD AUTO: 321 K/UL
POTASSIUM SERPL-SCNC: 4.8 MMOL/L
PROT SERPL-MCNC: 7 G/DL
PROT UR-MCNC: 13 MG/DL
PROTEIN URINE: NEGATIVE MG/DL
RBC # BLD: 4.7 M/UL
RBC # FLD: 14.1 %
RED BLOOD CELLS URINE: 0 /HPF
SODIUM SERPL-SCNC: 137 MMOL/L
SPECIFIC GRAVITY URINE: 1.02
TACROLIMUS SERPL-MCNC: 8.8 NG/ML
TRIGL SERPL-MCNC: 177 MG/DL
URATE SERPL-MCNC: 5.1 MG/DL
UROBILINOGEN URINE: 0.2 MG/DL
WBC # FLD AUTO: 7.72 K/UL
WHITE BLOOD CELLS URINE: 1 /HPF

## (undated) DEVICE — PACK BASIN SPECIAL PROCEDURE

## (undated) DEVICE — DRAPE 1/2 SHEET 40X57"

## (undated) DEVICE — WARMING BLANKET LOWER ADULT

## (undated) DEVICE — VESSEL LOOP MAXI-RED  0.120" X 16"

## (undated) DEVICE — SUT SOFSILK 4-0 18" V-20

## (undated) DEVICE — SYR LUER LOK 50CC

## (undated) DEVICE — ELCTR BOVIE TIP BLADE INSULATED 2.75" EDGE

## (undated) DEVICE — FOLEY TRAY 16FR LF URINE METER SURESTEP

## (undated) DEVICE — PREP CHLORAPREP HI-LITE ORANGE 26ML

## (undated) DEVICE — WARMING BLANKET UPPER ADULT

## (undated) DEVICE — NDL COUNTER FOAM AND MAGNET 40-70

## (undated) DEVICE — AORTIC PUNCH 4.8 MM LONG HANDLE

## (undated) DEVICE — SOL IRR POUR H2O 250ML

## (undated) DEVICE — TUBING TRUWAVE PRESSURE MALE/FEMALE 72"

## (undated) DEVICE — DRAIN PENROSE .25" X 18" LATEX

## (undated) DEVICE — SUT SOFSILK 4-0 18" TIES

## (undated) DEVICE — SUT BOOT STANDARD (ASSORTED) 5 PAIR

## (undated) DEVICE — VENODYNE/SCD SLEEVE CALF MEDIUM

## (undated) DEVICE — SYR LUER LOK 10CC

## (undated) DEVICE — SUT PROLENE 6-0 30" C-1

## (undated) DEVICE — SPECIMEN CONTAINER 100ML

## (undated) DEVICE — PACK MAJOR ABDOMINAL SUPINE

## (undated) DEVICE — DRAPE SLUSH / WARMER 44 X 66"

## (undated) DEVICE — GLV 7.5 PROTEXIS (WHITE)

## (undated) DEVICE — DRAPE 3/4 SHEET W REINFORCEMENT 56X77"

## (undated) DEVICE — STAPLER SKIN VISI-STAT 35 WIDE

## (undated) DEVICE — PACK BASIC GOWN

## (undated) DEVICE — STOPCOCK 4-WAY W SWIVEL MALE LUER LOCK NON VENTED RED CAP

## (undated) DEVICE — SUT PDS II PLUS 4-0 27" SH

## (undated) DEVICE — SUT SOFSILK 2-0 18" TIES

## (undated) DEVICE — SYR LUER LOK 20CC

## (undated) DEVICE — DRAPE GENERAL ENDOSCOPY

## (undated) DEVICE — CATH IV SAFE BC 20G X 1.16" (PINK)

## (undated) DEVICE — DRAPE TOWEL BLUE 17" X 24"

## (undated) DEVICE — SUT PROLENE 5-0 24" C-1

## (undated) DEVICE — ELCTR BOVIE PENCIL SMOKE EVACUATION

## (undated) DEVICE — DRAIN RESERVOIR FOR JACKSON PRATT 100CC CARDINAL

## (undated) DEVICE — ELCTR BOVIE TIP BLADE INSULATED 6.5" EDGE

## (undated) DEVICE — DRSG SURG OPSITE 10X4"

## (undated) DEVICE — SUT PDS PLUS 5-0 30" RB-1

## (undated) DEVICE — SUT POLYSORB 3-0 30" V-20 UNDYED

## (undated) DEVICE — SOL IRR POUR NS 0.9% 500ML

## (undated) DEVICE — DRAPE ISOLATION BAG 20X20"

## (undated) DEVICE — BAG DECANTER DISP